# Patient Record
Sex: FEMALE | Race: WHITE | NOT HISPANIC OR LATINO | Employment: FULL TIME | URBAN - METROPOLITAN AREA
[De-identification: names, ages, dates, MRNs, and addresses within clinical notes are randomized per-mention and may not be internally consistent; named-entity substitution may affect disease eponyms.]

---

## 2019-06-05 ENCOUNTER — HOSPITAL ENCOUNTER (EMERGENCY)
Facility: HOSPITAL | Age: 69
Discharge: HOME | End: 2019-06-05
Attending: EMERGENCY MEDICINE
Payer: COMMERCIAL

## 2019-06-05 VITALS
HEART RATE: 56 BPM | RESPIRATION RATE: 24 BRPM | DIASTOLIC BLOOD PRESSURE: 65 MMHG | TEMPERATURE: 98 F | SYSTOLIC BLOOD PRESSURE: 129 MMHG | HEIGHT: 66 IN | OXYGEN SATURATION: 92 % | WEIGHT: 220 LBS | BODY MASS INDEX: 35.36 KG/M2

## 2019-06-05 DIAGNOSIS — J44.1 COPD EXACERBATION: Primary | ICD-10-CM

## 2019-06-05 DIAGNOSIS — R06.02 SHORTNESS OF BREATH: ICD-10-CM

## 2019-06-05 LAB
ALBUMIN SERPL BCP-MCNC: 4 G/DL (ref 3.5–5.2)
ALP SERPL-CCNC: 99 U/L (ref 55–135)
ALT SERPL W/O P-5'-P-CCNC: 36 U/L (ref 10–44)
ANION GAP SERPL CALC-SCNC: 12 MMOL/L (ref 8–16)
AST SERPL-CCNC: 29 U/L (ref 10–40)
BASOPHILS # BLD AUTO: 0.13 K/UL (ref 0–0.2)
BASOPHILS NFR BLD: 1 % (ref 0–1.9)
BILIRUB SERPL-MCNC: 0.7 MG/DL (ref 0.1–1)
BNP SERPL-MCNC: 189 PG/ML (ref 0–99)
BUN SERPL-MCNC: 15 MG/DL (ref 8–23)
CALCIUM SERPL-MCNC: 9.5 MG/DL (ref 8.7–10.5)
CHLORIDE SERPL-SCNC: 103 MMOL/L (ref 95–110)
CO2 SERPL-SCNC: 25 MMOL/L (ref 23–29)
CREAT SERPL-MCNC: 1 MG/DL (ref 0.5–1.4)
DIFFERENTIAL METHOD: ABNORMAL
EOSINOPHIL # BLD AUTO: 2.3 K/UL (ref 0–0.5)
EOSINOPHIL NFR BLD: 18.1 % (ref 0–8)
ERYTHROCYTE [DISTWIDTH] IN BLOOD BY AUTOMATED COUNT: 13.7 % (ref 11.5–14.5)
EST. GFR  (AFRICAN AMERICAN): >60 ML/MIN/1.73 M^2
EST. GFR  (NON AFRICAN AMERICAN): 58 ML/MIN/1.73 M^2
GIANT PLATELETS BLD QL SMEAR: PRESENT
GLUCOSE SERPL-MCNC: 161 MG/DL (ref 70–110)
HCT VFR BLD AUTO: 48.5 % (ref 37–48.5)
HGB BLD-MCNC: 15.9 G/DL (ref 12–16)
INR PPP: 1.1 (ref 0.8–1.2)
LYMPHOCYTES # BLD AUTO: 2.7 K/UL (ref 1–4.8)
LYMPHOCYTES NFR BLD: 21.2 % (ref 18–48)
MAGNESIUM SERPL-MCNC: 2.2 MG/DL (ref 1.6–2.6)
MCH RBC QN AUTO: 30.4 PG (ref 27–31)
MCHC RBC AUTO-ENTMCNC: 32.8 G/DL (ref 32–36)
MCV RBC AUTO: 93 FL (ref 82–98)
MONOCYTES # BLD AUTO: 1.1 K/UL (ref 0.3–1)
MONOCYTES NFR BLD: 8.6 % (ref 4–15)
NEUTROPHILS # BLD AUTO: 6.4 K/UL (ref 1.8–7.7)
NEUTROPHILS NFR BLD: 51.3 % (ref 38–73)
PLATELET # BLD AUTO: 337 K/UL (ref 150–350)
PLATELET BLD QL SMEAR: ABNORMAL
PMV BLD AUTO: 11 FL (ref 9.2–12.9)
POTASSIUM SERPL-SCNC: 3.8 MMOL/L (ref 3.5–5.1)
PROT SERPL-MCNC: 7.6 G/DL (ref 6–8.4)
PROTHROMBIN TIME: 11.7 SEC (ref 9–12.5)
RBC # BLD AUTO: 5.23 M/UL (ref 4–5.4)
SODIUM SERPL-SCNC: 140 MMOL/L (ref 136–145)
TROPONIN I SERPL DL<=0.01 NG/ML-MCNC: 0.01 NG/ML (ref 0–0.03)
WBC # BLD AUTO: 12.57 K/UL (ref 3.9–12.7)

## 2019-06-05 PROCEDURE — 83735 ASSAY OF MAGNESIUM: CPT

## 2019-06-05 PROCEDURE — 93010 EKG 12-LEAD: ICD-10-PCS | Mod: ,,, | Performed by: INTERNAL MEDICINE

## 2019-06-05 PROCEDURE — 27000190 HC CPAP FULL FACE MASK W/VALVE

## 2019-06-05 PROCEDURE — 94660 CPAP INITIATION&MGMT: CPT

## 2019-06-05 PROCEDURE — 96374 THER/PROPH/DIAG INJ IV PUSH: CPT

## 2019-06-05 PROCEDURE — 85025 COMPLETE CBC W/AUTO DIFF WBC: CPT

## 2019-06-05 PROCEDURE — 99285 EMERGENCY DEPT VISIT HI MDM: CPT | Mod: 25

## 2019-06-05 PROCEDURE — 84484 ASSAY OF TROPONIN QUANT: CPT

## 2019-06-05 PROCEDURE — 85610 PROTHROMBIN TIME: CPT

## 2019-06-05 PROCEDURE — 83880 ASSAY OF NATRIURETIC PEPTIDE: CPT

## 2019-06-05 PROCEDURE — 25000242 PHARM REV CODE 250 ALT 637 W/ HCPCS: Performed by: EMERGENCY MEDICINE

## 2019-06-05 PROCEDURE — 94640 AIRWAY INHALATION TREATMENT: CPT

## 2019-06-05 PROCEDURE — 94644 CONT INHLJ TX 1ST HOUR: CPT

## 2019-06-05 PROCEDURE — 63600175 PHARM REV CODE 636 W HCPCS: Performed by: EMERGENCY MEDICINE

## 2019-06-05 PROCEDURE — 99900035 HC TECH TIME PER 15 MIN (STAT)

## 2019-06-05 PROCEDURE — 80053 COMPREHEN METABOLIC PANEL: CPT

## 2019-06-05 PROCEDURE — 93005 ELECTROCARDIOGRAM TRACING: CPT

## 2019-06-05 PROCEDURE — 93010 ELECTROCARDIOGRAM REPORT: CPT | Mod: ,,, | Performed by: INTERNAL MEDICINE

## 2019-06-05 RX ORDER — PREDNISONE 20 MG/1
40 TABLET ORAL DAILY
Qty: 8 TABLET | Refills: 0 | Status: SHIPPED | OUTPATIENT
Start: 2019-06-06 | End: 2019-06-10

## 2019-06-05 RX ORDER — FLUTICASONE PROPIONATE 50 MCG
1 SPRAY, SUSPENSION (ML) NASAL 2 TIMES DAILY
Qty: 1 BOTTLE | Refills: 0 | Status: SHIPPED | OUTPATIENT
Start: 2019-06-05 | End: 2019-06-10

## 2019-06-05 RX ORDER — VENLAFAXINE HYDROCHLORIDE 150 MG/1
150 CAPSULE, EXTENDED RELEASE ORAL DAILY
Status: ON HOLD | COMMUNITY
End: 2023-04-05 | Stop reason: HOSPADM

## 2019-06-05 RX ORDER — BENZONATATE 100 MG/1
100 CAPSULE ORAL 3 TIMES DAILY PRN
Qty: 20 CAPSULE | Refills: 0 | Status: SHIPPED | OUTPATIENT
Start: 2019-06-05 | End: 2019-06-15

## 2019-06-05 RX ORDER — METHYLPREDNISOLONE SOD SUCC 125 MG
125 VIAL (EA) INJECTION
Status: COMPLETED | OUTPATIENT
Start: 2019-06-05 | End: 2019-06-05

## 2019-06-05 RX ORDER — FOSINOPIRL SODIUM 10 MG/1
10 TABLET ORAL DAILY
COMMUNITY
End: 2021-09-27

## 2019-06-05 RX ORDER — ALBUTEROL SULFATE 2.5 MG/.5ML
15 SOLUTION RESPIRATORY (INHALATION)
Status: COMPLETED | OUTPATIENT
Start: 2019-06-05 | End: 2019-06-05

## 2019-06-05 RX ORDER — IPRATROPIUM BROMIDE 0.5 MG/2.5ML
1 SOLUTION RESPIRATORY (INHALATION)
Status: COMPLETED | OUTPATIENT
Start: 2019-06-05 | End: 2019-06-05

## 2019-06-05 RX ORDER — ALBUTEROL SULFATE 90 UG/1
2 AEROSOL, METERED RESPIRATORY (INHALATION) EVERY 4 HOURS PRN
Qty: 1 INHALER | Refills: 0 | Status: SHIPPED | OUTPATIENT
Start: 2019-06-05

## 2019-06-05 RX ADMIN — METHYLPREDNISOLONE SODIUM SUCCINATE 125 MG: 125 INJECTION, POWDER, FOR SOLUTION INTRAMUSCULAR; INTRAVENOUS at 12:06

## 2019-06-05 RX ADMIN — IPRATROPIUM BROMIDE 1 MG: 0.5 SOLUTION RESPIRATORY (INHALATION) at 12:06

## 2019-06-05 RX ADMIN — ALBUTEROL SULFATE 15 MG: 2.5 SOLUTION RESPIRATORY (INHALATION) at 12:06

## 2019-06-05 NOTE — PROGRESS NOTES
Pt arrived via EMS on CPAP sats 97%. Put pt on V60 BiPAP 12/5@50% sats 98%. Slight redness on bridge of nose from previous mask. Loose fit on new mask with good seal. Will change to TFM if redness does not improve.

## 2019-06-05 NOTE — ED TRIAGE NOTES
"Pt arrived to ED with c/o SOB x 1week but worse today. EMS reports pt RA sat was "88% but sounded like a fish." Pt on cpap pta, placed on bipap in room, 97%.   "

## 2019-06-05 NOTE — DISCHARGE INSTRUCTIONS
Your lab tests are within acceptable limits.  Chest x-ray does not show evidence of pneumonia.  Your symptoms are likely related to COPD.  Is important that you complete the entire course of prednisone prescribed.  Use albuterol as needed for shortness of breath or severe cough.  Return to the emergency department for fever, chest pain, breathing difficulty not improved by albuterol or any new, worsening or concerning symptoms.  It is very important that you maintain your appointment to have pulmonary function test as well as be evaluated by the pulmonologist.

## 2019-06-05 NOTE — ED PROVIDER NOTES
Encounter Date: 2019    SCRIBE #1 NOTE: I, Ami Vigil, am scribing for, and in the presence of,  Dr. Fritz. I have scribed the following portions of the note -       History     Chief Complaint   Patient presents with    Respiratory Distress     pt arrives via EMS for CC of SOB x 1-2 weeks and resp distress that started this morning; EMS was found at home diaphoretic and placed on CPAP; originally 88% on RA and went up to 98% on CPAP; pt has nitro paste on (L) side of chest     CC: SOB    HPI:  This is a 69 y.o. Female smoker with a PMHx of HTN and MI who presents to the Emergency Department with a cc of SOB that worsened this morning. Patient reports chronic cough for the past 6 months for which she has been following with her primary physician. 4 days ago her cough worsened and today she woke up with SOB. The patient reports associated nausea and headache when having severe episodes of coughing but denies vomiting, vision changes, numbness or weakness.. There were no reports alleviating or worsening factors; tried Albuterol pump without relief. She is scheduled for a PFTs tomorrow with pulmonology evaluation the following day.. She denies recent use of steroids. Also reports compliance with regular medication regimen.           The history is provided by the patient.     Review of patient's allergies indicates:   Allergen Reactions    Talwin [pentazocine lactate] Shortness Of Breath     Past Medical History:   Diagnosis Date    Anxiety     Depression     Heart attack     12 years ago    Hypertension      Past Surgical History:   Procedure Laterality Date     SECTION      EYE SURGERY       Family History   Problem Relation Age of Onset    Diabetes Mother     Heart disease Mother     Hypertension Mother     Diabetes Father     Heart disease Father     Diabetes Sister     Heart disease Sister      Social History     Tobacco Use    Smoking status: Current Every Day Smoker     Packs/day:  0.50     Types: Cigarettes   Substance Use Topics    Alcohol use: Yes     Comment: seldom    Drug use: No     Review of Systems   Constitutional: Negative for activity change.   HENT: Negative for drooling.    Eyes: Positive for visual disturbance. Negative for itching.   Respiratory: Positive for cough and shortness of breath.    Cardiovascular: Negative for chest pain and leg swelling.   Gastrointestinal: Positive for nausea. Negative for vomiting.   Genitourinary: Negative for dysuria.   Musculoskeletal: Negative for neck stiffness.   Skin: Negative for color change.   Neurological: Positive for headaches.   Psychiatric/Behavioral: Negative for confusion.       Physical Exam     Initial Vitals   BP Pulse Resp Temp SpO2   06/05/19 1227 06/05/19 1227 06/05/19 1231 06/05/19 1231 06/05/19 1227   (!) 213/101 65 (!) 26 98 °F (36.7 °C) 95 %      MAP       --                Physical Exam    Nursing note and vitals reviewed.  Constitutional: She is not diaphoretic. No distress.   bipap in place, speaks in full sentences   HENT:   Head: Normocephalic and atraumatic.   Mouth/Throat: Oropharynx is clear and moist.   Eyes: EOM are normal. Pupils are equal, round, and reactive to light. Right eye exhibits no discharge. No scleral icterus.   Neck: Normal range of motion. Neck supple. No JVD present.   Cardiovascular: Normal rate, regular rhythm and intact distal pulses.   Pulmonary/Chest: No stridor. No respiratory distress. She has wheezes (wheezing bilaterally ).   Abdominal: Soft. Bowel sounds are normal. She exhibits no distension. There is no tenderness.   Musculoskeletal: Normal range of motion. She exhibits no edema or tenderness.   Neurological: She is alert and oriented to person, place, and time. She has normal strength. No cranial nerve deficit or sensory deficit. GCS score is 15. GCS eye subscore is 4. GCS verbal subscore is 5. GCS motor subscore is 6.   Skin: Skin is warm and dry.   Psychiatric: She has a normal  mood and affect.         ED Course   Procedures  Labs Reviewed   CBC W/ AUTO DIFFERENTIAL - Abnormal; Notable for the following components:       Result Value    Mono # 1.1 (*)     Eos # 2.3 (*)     Eosinophil% 18.1 (*)     All other components within normal limits   COMPREHENSIVE METABOLIC PANEL - Abnormal; Notable for the following components:    Glucose 161 (*)     eGFR if non  58 (*)     All other components within normal limits   B-TYPE NATRIURETIC PEPTIDE - Abnormal; Notable for the following components:     (*)     All other components within normal limits   TROPONIN I   PROTIME-INR   MAGNESIUM     EKG Readings: (Independently Interpreted)   Initial Reading: No STEMI. Rhythm: Normal Sinus Rhythm. Heart Rate: 63. Ectopy: No Ectopy. Conduction: Normal. ST Segments: Normal ST Segments. T Waves: Normal. Clinical Impression: Normal Sinus Rhythm     ECG Results          EKG 12-lead (In process)  Result time 06/05/19 15:08:29    In process by Interface, Lab In [facility] (06/05/19 15:08:29)                 Narrative:    Test Reason : R06.02,    Vent. Rate : 063 BPM     Atrial Rate : 063 BPM     P-R Int : 166 ms          QRS Dur : 082 ms      QT Int : 420 ms       P-R-T Axes : 058 062 076 degrees     QTc Int : 429 ms    Normal sinus rhythm  Normal ECG  No previous ECGs available    Referred By: [email or web address]   SELF           Confirmed By:                   In process by Interface, Lab In [facility] (06/05/19 15:03:12)                 Narrative:    Test Reason : R06.02,    Vent. Rate : 063 BPM     Atrial Rate : 063 BPM     P-R Int : 166 ms          QRS Dur : 082 ms      QT Int : 420 ms       P-R-T Axes : 058 062 076 degrees     QTc Int : 429 ms    Normal sinus rhythm  Normal ECG  No previous ECGs available    Referred By: [email or web address]   SELF           Confirmed By:                             Imaging Results          X-Ray Chest AP Portable (Final result)  Result time 06/05/19 13:09:55    Final result by  Santo Kingsley MD (06/05/19 13:09:55)                 Impression:      See above      Electronically signed by: Santo Kingsley MD  Date:    06/05/2019  Time:    13:09             Narrative:    EXAMINATION:  XR CHEST AP PORTABLE    CLINICAL HISTORY:  SOB;    TECHNIQUE:  Single frontal view of the chest was performed.    COMPARISON:  None    FINDINGS:  Heart size normal.  The lungs are clear.  No pleural effusion.                              X-Rays:   Independently Interpreted Readings:   Chest X-Ray: No infiltrates.  No acute abnormalities.     Medical Decision Making:   History:   Old Medical Records: I decided to obtain old medical records.  Old Records Summarized: records from clinic visits.       <> Summary of Records: Recent PMD visits for evaluation of chronic cough.  Patient scheduled for pulmonary function test  Differential Diagnosis:    COPD exacerbation, CHF exacerbation, pneumonia, ACS, respiratory failure   Independently Interpreted Test(s):   I have ordered and independently interpreted X-rays - see prior notes.  I have ordered and independently interpreted EKG Reading(s) - see prior notes  Clinical Tests:   Lab Tests: Ordered and Reviewed  Radiological Study: Reviewed and Ordered  Medical Tests: Reviewed and Ordered  ED Management:  Patient is afebrile and in no acute distress at time history and physical.  She has BiPAP in place at time my assessment and is tolerating it well.  Patient speaks in full sentences and does not appear to be in respiratory distress. She has significant wheezing bilaterally. EKG is without definite acute ischemic changes.  Labs without leukocytosis or granulocytosis.  Chest x-ray without evidence of pneumonia.  Electrolytes within acceptable limits.  BNP is mildly elevated however patient has no JVD, no lower extremity edema and no pulmonary edema on chest x-ray.  Patient given continuous albuterol nebs and IV Solu-Medrol.  On reassessment she is resting comfortably in  symoneer.  She reports significant improvement in shortness of breath. Her oxygen saturation is in the low 90s on room air.  This is improved from the 80s that was reported prior to treatment.  Patient continues to speaking full sentences and does not have respiratory distress. She is stable for discharge to complete course prednisone and use albuterol pump and follow up with pulmonology for PFTs and further testing.  Patient and family members counseled on supportive care, appropriate medication usage, concerning symptoms for which to return to ER and the importance of follow up. Understanding and agreement with treatment plan was expressed.   This chart was completed using dictation software, as a result there may be some transcription errors.                       Clinical Impression:     1. COPD exacerbation    2. Shortness of breath            Disposition:   Disposition: Discharged  Condition: Stable           Scribe attestation: I, Chito Fritz , personally performed the services described in this documentation. All medical record entries made by the scribe were at my direction and in my presence.  I have reviewed the chart and agree that the record reflects my personal performance and is accurate and complete.               Chito Fritz MD  06/05/19 [id number]

## 2021-09-27 ENCOUNTER — HOSPITAL ENCOUNTER (OUTPATIENT)
Facility: HOSPITAL | Age: 71
Discharge: HOME | End: 2021-09-28
Attending: EMERGENCY MEDICINE | Admitting: STUDENT IN AN ORGANIZED HEALTH CARE EDUCATION/TRAINING PROGRAM
Payer: COMMERCIAL

## 2021-09-27 DIAGNOSIS — I25.110 CORONARY ARTERY DISEASE INVOLVING NATIVE CORONARY ARTERY OF NATIVE HEART WITH UNSTABLE ANGINA PECTORIS: ICD-10-CM

## 2021-09-27 DIAGNOSIS — R79.89 ELEVATED TROPONIN: ICD-10-CM

## 2021-09-27 DIAGNOSIS — R07.9 CHEST PAIN: ICD-10-CM

## 2021-09-27 DIAGNOSIS — R07.9 CHEST PAIN, MODERATE CORONARY ARTERY RISK: Primary | ICD-10-CM

## 2021-09-27 PROBLEM — I10 ESSENTIAL HYPERTENSION: Status: ACTIVE | Noted: 2021-09-27

## 2021-09-27 PROBLEM — I25.10 ATHEROSCLEROSIS OF CORONARY ARTERY WITHOUT ANGINA PECTORIS: Status: ACTIVE | Noted: 2021-09-27

## 2021-09-27 PROBLEM — J41.0 SIMPLE CHRONIC BRONCHITIS: Status: ACTIVE | Noted: 2021-09-27

## 2021-09-27 PROBLEM — Z87.891 FORMER SMOKER: Status: ACTIVE | Noted: 2021-09-27

## 2021-09-27 LAB
ALBUMIN SERPL BCP-MCNC: 3.6 G/DL (ref 3.5–5.2)
ALP SERPL-CCNC: 111 U/L (ref 55–135)
ALT SERPL W/O P-5'-P-CCNC: 30 U/L (ref 10–44)
ANION GAP SERPL CALC-SCNC: 11 MMOL/L (ref 8–16)
AORTIC ROOT ANNULUS: 2.73 CM
AORTIC VALVE CUSP SEPERATION: 1.95 CM
ASCENDING AORTA: 2.63 CM
AST SERPL-CCNC: 19 U/L (ref 10–40)
AV INDEX (PROSTH): 0.85
AV MEAN GRADIENT: 8 MMHG
AV PEAK GRADIENT: 12 MMHG
AV VALVE AREA: 2.54 CM2
AV VELOCITY RATIO: 0.93
BASOPHILS # BLD AUTO: 0.1 K/UL (ref 0–0.2)
BASOPHILS NFR BLD: 0.8 % (ref 0–1.9)
BILIRUB SERPL-MCNC: 0.6 MG/DL (ref 0.1–1)
BSA FOR ECHO PROCEDURE: 2.16 M2
BUN SERPL-MCNC: 20 MG/DL (ref 8–23)
CALCIUM SERPL-MCNC: 9.9 MG/DL (ref 8.7–10.5)
CHLORIDE SERPL-SCNC: 107 MMOL/L (ref 95–110)
CHOLEST SERPL-MCNC: 122 MG/DL (ref 120–199)
CHOLEST/HDLC SERPL: 2.7 {RATIO} (ref 2–5)
CO2 SERPL-SCNC: 23 MMOL/L (ref 23–29)
CREAT SERPL-MCNC: 0.8 MG/DL (ref 0.5–1.4)
CTP QC/QA: YES
CV ECHO LV RWT: 0.69 CM
DIFFERENTIAL METHOD: ABNORMAL
DOP CALC AO PEAK VEL: 1.75 M/S
DOP CALC AO VTI: 41.57 CM
DOP CALC LVOT AREA: 3 CM2
DOP CALC LVOT DIAMETER: 1.95 CM
DOP CALC LVOT PEAK VEL: 1.62 M/S
DOP CALC LVOT STROKE VOLUME: 105.43 CM3
DOP CALCLVOT PEAK VEL VTI: 35.32 CM
E WAVE DECELERATION TIME: 309.86 MSEC
E/A RATIO: 1.14
E/E' RATIO: 15.17 M/S
ECHO LV POSTERIOR WALL: 1.34 CM (ref 0.6–1.1)
EJECTION FRACTION: 65 %
EOSINOPHIL # BLD AUTO: 0.4 K/UL (ref 0–0.5)
EOSINOPHIL NFR BLD: 2.9 % (ref 0–8)
ERYTHROCYTE [DISTWIDTH] IN BLOOD BY AUTOMATED COUNT: 13.7 % (ref 11.5–14.5)
EST. GFR  (AFRICAN AMERICAN): >60 ML/MIN/1.73 M^2
EST. GFR  (NON AFRICAN AMERICAN): >60 ML/MIN/1.73 M^2
FRACTIONAL SHORTENING: 36 % (ref 28–44)
GLUCOSE SERPL-MCNC: 188 MG/DL (ref 70–110)
HCT VFR BLD AUTO: 47.4 % (ref 37–48.5)
HDLC SERPL-MCNC: 46 MG/DL (ref 40–75)
HDLC SERPL: 37.7 % (ref 20–50)
HGB BLD-MCNC: 16.4 G/DL (ref 12–16)
IMM GRANULOCYTES # BLD AUTO: 0.04 K/UL (ref 0–0.04)
IMM GRANULOCYTES NFR BLD AUTO: 0.3 % (ref 0–0.5)
INTERVENTRICULAR SEPTUM: 1.28 CM (ref 0.6–1.1)
IVRT: 124.57 MSEC
LA MAJOR: 5.68 CM
LA MINOR: 5.52 CM
LA WIDTH: 4.02 CM
LDLC SERPL CALC-MCNC: 58.4 MG/DL (ref 63–159)
LEFT ATRIUM SIZE: 3.47 CM
LEFT ATRIUM VOLUME INDEX: 31.9 ML/M2
LEFT ATRIUM VOLUME: 66.39 CM3
LEFT INTERNAL DIMENSION IN SYSTOLE: 2.5 CM (ref 2.1–4)
LEFT VENTRICLE DIASTOLIC VOLUME INDEX: 31.37 ML/M2
LEFT VENTRICLE DIASTOLIC VOLUME: 65.24 ML
LEFT VENTRICLE MASS INDEX: 87 G/M2
LEFT VENTRICLE SYSTOLIC VOLUME INDEX: 10.7 ML/M2
LEFT VENTRICLE SYSTOLIC VOLUME: 22.31 ML
LEFT VENTRICULAR INTERNAL DIMENSION IN DIASTOLE: 3.88 CM (ref 3.5–6)
LEFT VENTRICULAR MASS: 180.49 G
LV LATERAL E/E' RATIO: 13 M/S
LV SEPTAL E/E' RATIO: 18.2 M/S
LYMPHOCYTES # BLD AUTO: 1.8 K/UL (ref 1–4.8)
LYMPHOCYTES NFR BLD: 13.9 % (ref 18–48)
MAGNESIUM SERPL-MCNC: 2.1 MG/DL (ref 1.6–2.6)
MCH RBC QN AUTO: 30.6 PG (ref 27–31)
MCHC RBC AUTO-ENTMCNC: 34.6 G/DL (ref 32–36)
MCV RBC AUTO: 88 FL (ref 82–98)
MONOCYTES # BLD AUTO: 1.2 K/UL (ref 0.3–1)
MONOCYTES NFR BLD: 9.2 % (ref 4–15)
MV PEAK A VEL: 0.8 M/S
MV PEAK E VEL: 0.91 M/S
MV STENOSIS PRESSURE HALF TIME: 89.86 MS
MV VALVE AREA P 1/2 METHOD: 2.45 CM2
NEUTROPHILS # BLD AUTO: 9.2 K/UL (ref 1.8–7.7)
NEUTROPHILS NFR BLD: 72.9 % (ref 38–73)
NONHDLC SERPL-MCNC: 76 MG/DL
NRBC BLD-RTO: 0 /100 WBC
PISA TR MAX VEL: 2.17 M/S
PLATELET # BLD AUTO: 280 K/UL (ref 150–450)
PMV BLD AUTO: 10.4 FL (ref 9.2–12.9)
POTASSIUM SERPL-SCNC: 3 MMOL/L (ref 3.5–5.1)
PROT SERPL-MCNC: 7.2 G/DL (ref 6–8.4)
PULM VEIN S/D RATIO: 0.91
PV PEAK D VEL: 0.53 M/S
PV PEAK S VEL: 0.48 M/S
PV PEAK VELOCITY: 0.94 CM/S
RA MAJOR: 4.96 CM
RA WIDTH: 2.98 CM
RBC # BLD AUTO: 5.36 M/UL (ref 4–5.4)
RIGHT VENTRICULAR END-DIASTOLIC DIMENSION: 3.38 CM
RV TISSUE DOPPLER FREE WALL SYSTOLIC VELOCITY 1 (APICAL 4 CHAMBER VIEW): 13.72 CM/S
SARS-COV-2 RDRP RESP QL NAA+PROBE: NEGATIVE
SINUS: 2.84 CM
SODIUM SERPL-SCNC: 141 MMOL/L (ref 136–145)
STJ: 2.3 CM
TDI LATERAL: 0.07 M/S
TDI SEPTAL: 0.05 M/S
TDI: 0.06 M/S
TR MAX PG: 19 MMHG
TRICUSPID ANNULAR PLANE SYSTOLIC EXCURSION: 1.76 CM
TRIGL SERPL-MCNC: 88 MG/DL (ref 30–150)
TROPONIN I SERPL DL<=0.01 NG/ML-MCNC: 0.07 NG/ML (ref 0–0.03)
TROPONIN I SERPL DL<=0.01 NG/ML-MCNC: 0.13 NG/ML (ref 0–0.03)
TROPONIN I SERPL DL<=0.01 NG/ML-MCNC: 0.14 NG/ML (ref 0–0.03)
TSH SERPL DL<=0.005 MIU/L-ACNC: 2.31 UIU/ML (ref 0.4–4)
WBC # BLD AUTO: 12.63 K/UL (ref 3.9–12.7)

## 2021-09-27 PROCEDURE — 93005 ELECTROCARDIOGRAM TRACING: CPT

## 2021-09-27 PROCEDURE — U0002 COVID-19 LAB TEST NON-CDC: HCPCS | Performed by: EMERGENCY MEDICINE

## 2021-09-27 PROCEDURE — 84484 ASSAY OF TROPONIN QUANT: CPT | Performed by: NURSE PRACTITIONER

## 2021-09-27 PROCEDURE — 36415 COLL VENOUS BLD VENIPUNCTURE: CPT | Performed by: STUDENT IN AN ORGANIZED HEALTH CARE EDUCATION/TRAINING PROGRAM

## 2021-09-27 PROCEDURE — 25000003 PHARM REV CODE 250: Performed by: NURSE PRACTITIONER

## 2021-09-27 PROCEDURE — 84443 ASSAY THYROID STIM HORMONE: CPT | Performed by: NURSE PRACTITIONER

## 2021-09-27 PROCEDURE — G0378 HOSPITAL OBSERVATION PER HR: HCPCS

## 2021-09-27 PROCEDURE — 80053 COMPREHEN METABOLIC PANEL: CPT | Performed by: NURSE PRACTITIONER

## 2021-09-27 PROCEDURE — 83735 ASSAY OF MAGNESIUM: CPT | Performed by: NURSE PRACTITIONER

## 2021-09-27 PROCEDURE — 99284 PR EMERGENCY DEPT VISIT,LEVEL IV: ICD-10-PCS | Mod: 25,,, | Performed by: INTERNAL MEDICINE

## 2021-09-27 PROCEDURE — 63600175 PHARM REV CODE 636 W HCPCS: Performed by: NURSE PRACTITIONER

## 2021-09-27 PROCEDURE — 93010 ELECTROCARDIOGRAM REPORT: CPT | Mod: ,,, | Performed by: INTERNAL MEDICINE

## 2021-09-27 PROCEDURE — 85025 COMPLETE CBC W/AUTO DIFF WBC: CPT | Performed by: NURSE PRACTITIONER

## 2021-09-27 PROCEDURE — 96366 THER/PROPH/DIAG IV INF ADDON: CPT

## 2021-09-27 PROCEDURE — 84484 ASSAY OF TROPONIN QUANT: CPT | Mod: 91 | Performed by: STUDENT IN AN ORGANIZED HEALTH CARE EDUCATION/TRAINING PROGRAM

## 2021-09-27 PROCEDURE — 99284 EMERGENCY DEPT VISIT MOD MDM: CPT | Mod: 25,,, | Performed by: INTERNAL MEDICINE

## 2021-09-27 PROCEDURE — 80061 LIPID PANEL: CPT | Performed by: NURSE PRACTITIONER

## 2021-09-27 PROCEDURE — 99285 EMERGENCY DEPT VISIT HI MDM: CPT | Mod: 25

## 2021-09-27 PROCEDURE — 84484 ASSAY OF TROPONIN QUANT: CPT | Mod: 91 | Performed by: NURSE PRACTITIONER

## 2021-09-27 PROCEDURE — 93010 EKG 12-LEAD: ICD-10-PCS | Mod: ,,, | Performed by: INTERNAL MEDICINE

## 2021-09-27 PROCEDURE — 96365 THER/PROPH/DIAG IV INF INIT: CPT | Mod: 59

## 2021-09-27 RX ORDER — EPINEPHRINE 0.15 MG/.3ML
0.15 INJECTION INTRAMUSCULAR
COMMUNITY

## 2021-09-27 RX ORDER — FAMOTIDINE 20 MG/1
20 TABLET, FILM COATED ORAL 2 TIMES DAILY
Status: ON HOLD | COMMUNITY
End: 2023-04-05 | Stop reason: HOSPADM

## 2021-09-27 RX ORDER — ONDANSETRON 8 MG/1
8 TABLET, ORALLY DISINTEGRATING ORAL EVERY 8 HOURS PRN
Status: DISCONTINUED | OUTPATIENT
Start: 2021-09-27 | End: 2021-09-28 | Stop reason: HOSPADM

## 2021-09-27 RX ORDER — LISINOPRIL 5 MG/1
10 TABLET ORAL DAILY
Status: DISCONTINUED | OUTPATIENT
Start: 2021-09-27 | End: 2021-09-27

## 2021-09-27 RX ORDER — FLUTICASONE PROPIONATE 50 MCG
1 SPRAY, SUSPENSION (ML) NASAL DAILY
COMMUNITY

## 2021-09-27 RX ORDER — ACETAMINOPHEN 325 MG/1
650 TABLET ORAL EVERY 4 HOURS PRN
Status: DISCONTINUED | OUTPATIENT
Start: 2021-09-27 | End: 2021-09-28 | Stop reason: HOSPADM

## 2021-09-27 RX ORDER — NITROGLYCERIN 0.4 MG/1
0.4 TABLET SUBLINGUAL
COMMUNITY
Start: 2021-06-02 | End: 2023-04-12

## 2021-09-27 RX ORDER — ATORVASTATIN CALCIUM 40 MG/1
40 TABLET, FILM COATED ORAL DAILY
Status: DISCONTINUED | OUTPATIENT
Start: 2021-09-27 | End: 2021-09-28 | Stop reason: HOSPADM

## 2021-09-27 RX ORDER — METOPROLOL TARTRATE 50 MG/1
50 TABLET ORAL 2 TIMES DAILY
Status: ON HOLD | COMMUNITY
End: 2023-04-05 | Stop reason: SDUPTHER

## 2021-09-27 RX ORDER — SODIUM CHLORIDE 0.9 % (FLUSH) 0.9 %
10 SYRINGE (ML) INJECTION
Status: DISCONTINUED | OUTPATIENT
Start: 2021-09-27 | End: 2021-09-28 | Stop reason: HOSPADM

## 2021-09-27 RX ORDER — METOPROLOL SUCCINATE 50 MG/1
50 TABLET, EXTENDED RELEASE ORAL DAILY
Status: DISCONTINUED | OUTPATIENT
Start: 2021-09-27 | End: 2021-09-28 | Stop reason: HOSPADM

## 2021-09-27 RX ORDER — HYDROCHLOROTHIAZIDE 25 MG/1
25 TABLET ORAL DAILY
Status: DISCONTINUED | OUTPATIENT
Start: 2021-09-27 | End: 2021-09-28

## 2021-09-27 RX ORDER — ASPIRIN 81 MG/1
81 TABLET ORAL DAILY
Status: DISCONTINUED | OUTPATIENT
Start: 2021-09-28 | End: 2021-09-28 | Stop reason: HOSPADM

## 2021-09-27 RX ORDER — POTASSIUM CHLORIDE 7.45 MG/ML
10 INJECTION INTRAVENOUS
Status: DISPENSED | OUTPATIENT
Start: 2021-09-27 | End: 2021-09-27

## 2021-09-27 RX ADMIN — HYDROCHLOROTHIAZIDE 25 MG: 25 TABLET ORAL at 10:09

## 2021-09-27 RX ADMIN — POTASSIUM CHLORIDE 10 MEQ: 7.46 INJECTION, SOLUTION INTRAVENOUS at 10:09

## 2021-09-27 RX ADMIN — METOPROLOL SUCCINATE 50 MG: 50 TABLET, EXTENDED RELEASE ORAL at 10:09

## 2021-09-27 RX ADMIN — ATORVASTATIN CALCIUM 40 MG: 40 TABLET, FILM COATED ORAL at 10:09

## 2021-09-28 VITALS
BODY MASS INDEX: 35.36 KG/M2 | RESPIRATION RATE: 17 BRPM | OXYGEN SATURATION: 97 % | DIASTOLIC BLOOD PRESSURE: 62 MMHG | HEIGHT: 66 IN | WEIGHT: 220 LBS | SYSTOLIC BLOOD PRESSURE: 135 MMHG | HEART RATE: 63 BPM | TEMPERATURE: 99 F

## 2021-09-28 LAB
ALBUMIN SERPL BCP-MCNC: 3.5 G/DL (ref 3.5–5.2)
ALP SERPL-CCNC: 93 U/L (ref 55–135)
ALT SERPL W/O P-5'-P-CCNC: 29 U/L (ref 10–44)
AMORPH CRY URNS QL MICRO: ABNORMAL
ANION GAP SERPL CALC-SCNC: 11 MMOL/L (ref 8–16)
AST SERPL-CCNC: 22 U/L (ref 10–40)
BACTERIA #/AREA URNS HPF: ABNORMAL /HPF
BASOPHILS # BLD AUTO: 0.1 K/UL (ref 0–0.2)
BASOPHILS NFR BLD: 0.9 % (ref 0–1.9)
BILIRUB SERPL-MCNC: 0.8 MG/DL (ref 0.1–1)
BILIRUB UR QL STRIP: NEGATIVE
BUN SERPL-MCNC: 14 MG/DL (ref 8–23)
CALCIUM SERPL-MCNC: 9.6 MG/DL (ref 8.7–10.5)
CHLORIDE SERPL-SCNC: 109 MMOL/L (ref 95–110)
CLARITY UR: ABNORMAL
CO2 SERPL-SCNC: 23 MMOL/L (ref 23–29)
COLOR UR: YELLOW
CREAT SERPL-MCNC: 0.8 MG/DL (ref 0.5–1.4)
CV STRESS BASE HR: 66 BPM
DIASTOLIC BLOOD PRESSURE: 71 MMHG
DIFFERENTIAL METHOD: ABNORMAL
EOSINOPHIL # BLD AUTO: 0.8 K/UL (ref 0–0.5)
EOSINOPHIL NFR BLD: 7 % (ref 0–8)
ERYTHROCYTE [DISTWIDTH] IN BLOOD BY AUTOMATED COUNT: 13.8 % (ref 11.5–14.5)
EST. GFR  (AFRICAN AMERICAN): >60 ML/MIN/1.73 M^2
EST. GFR  (NON AFRICAN AMERICAN): >60 ML/MIN/1.73 M^2
GLUCOSE SERPL-MCNC: 115 MG/DL (ref 70–110)
GLUCOSE UR QL STRIP: NEGATIVE
HCT VFR BLD AUTO: 46.7 % (ref 37–48.5)
HGB BLD-MCNC: 15.4 G/DL (ref 12–16)
HGB UR QL STRIP: ABNORMAL
HYALINE CASTS #/AREA URNS LPF: 0 /LPF
IMM GRANULOCYTES # BLD AUTO: 0.03 K/UL (ref 0–0.04)
IMM GRANULOCYTES NFR BLD AUTO: 0.3 % (ref 0–0.5)
KETONES UR QL STRIP: NEGATIVE
LEUKOCYTE ESTERASE UR QL STRIP: ABNORMAL
LYMPHOCYTES # BLD AUTO: 3.2 K/UL (ref 1–4.8)
LYMPHOCYTES NFR BLD: 29.7 % (ref 18–48)
MAGNESIUM SERPL-MCNC: 2.2 MG/DL (ref 1.6–2.6)
MCH RBC QN AUTO: 30.3 PG (ref 27–31)
MCHC RBC AUTO-ENTMCNC: 33 G/DL (ref 32–36)
MCV RBC AUTO: 92 FL (ref 82–98)
MICROSCOPIC COMMENT: ABNORMAL
MONOCYTES # BLD AUTO: 1.2 K/UL (ref 0.3–1)
MONOCYTES NFR BLD: 11.3 % (ref 4–15)
NEUTROPHILS # BLD AUTO: 5.5 K/UL (ref 1.8–7.7)
NEUTROPHILS NFR BLD: 50.8 % (ref 38–73)
NITRITE UR QL STRIP: NEGATIVE
NON-SQ EPI CELLS #/AREA URNS HPF: 3 /HPF
NRBC BLD-RTO: 0 /100 WBC
NUC STRESS DIASTOLIC VOLUME INDEX: 39
NUC STRESS EJECTION FRACTION: 83 %
NUC STRESS SYSTOLIC VOLUME INDEX: 7
OHS CV CPX 85 PERCENT MAX PREDICTED HEART RATE MALE: 122
OHS CV CPX MAX PREDICTED HEART RATE: 144
OHS CV CPX PATIENT IS FEMALE: 1
OHS CV CPX PATIENT IS MALE: 0
OHS CV CPX PEAK DIASTOLIC BLOOD PRESSURE: 63 MMHG
OHS CV CPX PEAK HEAR RATE: 76 BPM
OHS CV CPX PEAK RATE PRESSURE PRODUCT: 8512
OHS CV CPX PEAK SYSTOLIC BLOOD PRESSURE: 112 MMHG
OHS CV CPX PERCENT MAX PREDICTED HEART RATE ACHIEVED: 53
OHS CV CPX RATE PRESSURE PRODUCT PRESENTING: 9636
PH UR STRIP: 6 [PH] (ref 5–8)
PLATELET # BLD AUTO: 296 K/UL (ref 150–450)
PMV BLD AUTO: 10.4 FL (ref 9.2–12.9)
POCT GLUCOSE: 123 MG/DL (ref 70–110)
POTASSIUM SERPL-SCNC: 3.4 MMOL/L (ref 3.5–5.1)
PROT SERPL-MCNC: 6.9 G/DL (ref 6–8.4)
PROT UR QL STRIP: ABNORMAL
RBC # BLD AUTO: 5.08 M/UL (ref 4–5.4)
RBC #/AREA URNS HPF: 5 /HPF (ref 0–4)
SODIUM SERPL-SCNC: 143 MMOL/L (ref 136–145)
SP GR UR STRIP: 1.02 (ref 1–1.03)
SQUAMOUS #/AREA URNS HPF: 10 /HPF
SYSTOLIC BLOOD PRESSURE: 146 MMHG
TROPONIN I SERPL DL<=0.01 NG/ML-MCNC: 0.05 NG/ML (ref 0–0.03)
TROPONIN I SERPL DL<=0.01 NG/ML-MCNC: 0.07 NG/ML (ref 0–0.03)
TROPONIN I SERPL DL<=0.01 NG/ML-MCNC: 0.08 NG/ML (ref 0–0.03)
TROPONIN I SERPL DL<=0.01 NG/ML-MCNC: 0.09 NG/ML (ref 0–0.03)
UNIDENT CRYS URNS QL MICRO: ABNORMAL
URN SPEC COLLECT METH UR: ABNORMAL
UROBILINOGEN UR STRIP-ACNC: NEGATIVE EU/DL
WBC # BLD AUTO: 10.77 K/UL (ref 3.9–12.7)
WBC #/AREA URNS HPF: 60 /HPF (ref 0–5)
WBC CLUMPS URNS QL MICRO: ABNORMAL
YEAST URNS QL MICRO: ABNORMAL

## 2021-09-28 PROCEDURE — 87147 CULTURE TYPE IMMUNOLOGIC: CPT | Performed by: NURSE PRACTITIONER

## 2021-09-28 PROCEDURE — 81000 URINALYSIS NONAUTO W/SCOPE: CPT | Performed by: NURSE PRACTITIONER

## 2021-09-28 PROCEDURE — 36415 COLL VENOUS BLD VENIPUNCTURE: CPT | Performed by: STUDENT IN AN ORGANIZED HEALTH CARE EDUCATION/TRAINING PROGRAM

## 2021-09-28 PROCEDURE — 80053 COMPREHEN METABOLIC PANEL: CPT | Performed by: NURSE PRACTITIONER

## 2021-09-28 PROCEDURE — 84484 ASSAY OF TROPONIN QUANT: CPT | Mod: 91 | Performed by: STUDENT IN AN ORGANIZED HEALTH CARE EDUCATION/TRAINING PROGRAM

## 2021-09-28 PROCEDURE — 87088 URINE BACTERIA CULTURE: CPT | Performed by: NURSE PRACTITIONER

## 2021-09-28 PROCEDURE — 87077 CULTURE AEROBIC IDENTIFY: CPT | Performed by: NURSE PRACTITIONER

## 2021-09-28 PROCEDURE — G0378 HOSPITAL OBSERVATION PER HR: HCPCS

## 2021-09-28 PROCEDURE — 63600175 PHARM REV CODE 636 W HCPCS: Performed by: INTERNAL MEDICINE

## 2021-09-28 PROCEDURE — 25000003 PHARM REV CODE 250: Performed by: NURSE PRACTITIONER

## 2021-09-28 PROCEDURE — 87086 URINE CULTURE/COLONY COUNT: CPT | Performed by: NURSE PRACTITIONER

## 2021-09-28 PROCEDURE — 85025 COMPLETE CBC W/AUTO DIFF WBC: CPT | Performed by: NURSE PRACTITIONER

## 2021-09-28 PROCEDURE — 87186 SC STD MICRODIL/AGAR DIL: CPT | Performed by: NURSE PRACTITIONER

## 2021-09-28 PROCEDURE — 83735 ASSAY OF MAGNESIUM: CPT | Performed by: NURSE PRACTITIONER

## 2021-09-28 RX ORDER — REGADENOSON 0.08 MG/ML
0.4 INJECTION, SOLUTION INTRAVENOUS ONCE
Status: COMPLETED | OUTPATIENT
Start: 2021-09-28 | End: 2021-09-28

## 2021-09-28 RX ORDER — POTASSIUM CHLORIDE 20 MEQ/1
40 TABLET, EXTENDED RELEASE ORAL ONCE
Status: COMPLETED | OUTPATIENT
Start: 2021-09-28 | End: 2021-09-28

## 2021-09-28 RX ORDER — NITROFURANTOIN 25; 75 MG/1; MG/1
100 CAPSULE ORAL EVERY 12 HOURS
Qty: 10 CAPSULE | Refills: 0 | Status: SHIPPED | OUTPATIENT
Start: 2021-09-28 | End: 2021-10-03

## 2021-09-28 RX ORDER — NITROFURANTOIN 25; 75 MG/1; MG/1
100 CAPSULE ORAL EVERY 12 HOURS
Status: DISCONTINUED | OUTPATIENT
Start: 2021-09-28 | End: 2021-09-28 | Stop reason: HOSPADM

## 2021-09-28 RX ADMIN — METOPROLOL SUCCINATE 50 MG: 50 TABLET, EXTENDED RELEASE ORAL at 08:09

## 2021-09-28 RX ADMIN — ASPIRIN 81 MG: 81 TABLET, COATED ORAL at 08:09

## 2021-09-28 RX ADMIN — REGADENOSON 0.4 MG: 0.08 INJECTION, SOLUTION INTRAVENOUS at 10:09

## 2021-09-28 RX ADMIN — POTASSIUM CHLORIDE 40 MEQ: 1500 TABLET, EXTENDED RELEASE ORAL at 06:09

## 2021-09-28 RX ADMIN — ATORVASTATIN CALCIUM 40 MG: 40 TABLET, FILM COATED ORAL at 08:09

## 2021-09-30 LAB
BACTERIA UR CULT: ABNORMAL
BACTERIA UR CULT: ABNORMAL

## 2023-04-03 ENCOUNTER — HOSPITAL ENCOUNTER (INPATIENT)
Facility: HOSPITAL | Age: 73
LOS: 1 days | Discharge: HOME | DRG: 066 | End: 2023-04-05
Attending: EMERGENCY MEDICINE | Admitting: STUDENT IN AN ORGANIZED HEALTH CARE EDUCATION/TRAINING PROGRAM
Payer: COMMERCIAL

## 2023-04-03 DIAGNOSIS — R29.90 STROKE-LIKE SYMPTOMS: ICD-10-CM

## 2023-04-03 DIAGNOSIS — I63.9 STROKE: ICD-10-CM

## 2023-04-03 DIAGNOSIS — I63.9 CVA (CEREBRAL VASCULAR ACCIDENT): ICD-10-CM

## 2023-04-03 DIAGNOSIS — E78.5 DYSLIPIDEMIA: Primary | ICD-10-CM

## 2023-04-03 PROBLEM — R06.2 WHEEZING: Status: ACTIVE | Noted: 2023-04-03

## 2023-04-03 PROBLEM — R29.810 FACIAL DROOP: Status: ACTIVE | Noted: 2023-04-03

## 2023-04-03 LAB
ALBUMIN SERPL BCP-MCNC: 3.8 G/DL (ref 3.5–5.2)
ALP SERPL-CCNC: 95 U/L (ref 55–135)
ALT SERPL W/O P-5'-P-CCNC: 27 U/L (ref 10–44)
ANION GAP SERPL CALC-SCNC: 11 MMOL/L (ref 8–16)
ANION GAP SERPL CALC-SCNC: 12 MMOL/L (ref 8–16)
AST SERPL-CCNC: 24 U/L (ref 10–40)
BASOPHILS # BLD AUTO: 0.11 K/UL (ref 0–0.2)
BASOPHILS NFR BLD: 1 % (ref 0–1.9)
BILIRUB SERPL-MCNC: 0.9 MG/DL (ref 0.1–1)
BUN SERPL-MCNC: 17 MG/DL (ref 8–23)
BUN SERPL-MCNC: 21 MG/DL (ref 6–30)
CALCIUM SERPL-MCNC: 9.3 MG/DL (ref 8.7–10.5)
CHLORIDE SERPL-SCNC: 101 MMOL/L (ref 95–110)
CHLORIDE SERPL-SCNC: 104 MMOL/L (ref 95–110)
CHOLEST SERPL-MCNC: 132 MG/DL (ref 120–199)
CHOLEST/HDLC SERPL: 3.1 {RATIO} (ref 2–5)
CO2 SERPL-SCNC: 24 MMOL/L (ref 23–29)
CREAT SERPL-MCNC: 0.9 MG/DL (ref 0.5–1.4)
CREAT SERPL-MCNC: 1 MG/DL (ref 0.5–1.4)
CTP QC/QA: YES
CTP QC/QA: YES
DIFFERENTIAL METHOD: ABNORMAL
EOSINOPHIL # BLD AUTO: 0.6 K/UL (ref 0–0.5)
EOSINOPHIL NFR BLD: 5.6 % (ref 0–8)
ERYTHROCYTE [DISTWIDTH] IN BLOOD BY AUTOMATED COUNT: 14 % (ref 11.5–14.5)
EST. GFR  (NO RACE VARIABLE): 59 ML/MIN/1.73 M^2
GLUCOSE SERPL-MCNC: 144 MG/DL (ref 70–110)
GLUCOSE SERPL-MCNC: 145 MG/DL (ref 70–110)
HCT VFR BLD AUTO: 46.9 % (ref 37–48.5)
HCT VFR BLD CALC: 49 %PCV (ref 36–54)
HDLC SERPL-MCNC: 43 MG/DL (ref 40–75)
HDLC SERPL: 32.6 % (ref 20–50)
HGB BLD-MCNC: 15.3 G/DL (ref 12–16)
IMM GRANULOCYTES # BLD AUTO: 0.03 K/UL (ref 0–0.04)
IMM GRANULOCYTES NFR BLD AUTO: 0.3 % (ref 0–0.5)
INR PPP: 1.2 (ref 0.8–1.2)
LDLC SERPL CALC-MCNC: 66.2 MG/DL (ref 63–159)
LYMPHOCYTES # BLD AUTO: 2.6 K/UL (ref 1–4.8)
LYMPHOCYTES NFR BLD: 22.3 % (ref 18–48)
MCH RBC QN AUTO: 29.5 PG (ref 27–31)
MCHC RBC AUTO-ENTMCNC: 32.6 G/DL (ref 32–36)
MCV RBC AUTO: 91 FL (ref 82–98)
MONOCYTES # BLD AUTO: 1.1 K/UL (ref 0.3–1)
MONOCYTES NFR BLD: 9.3 % (ref 4–15)
NEUTROPHILS # BLD AUTO: 7.1 K/UL (ref 1.8–7.7)
NEUTROPHILS NFR BLD: 61.5 % (ref 38–73)
NONHDLC SERPL-MCNC: 89 MG/DL
NRBC BLD-RTO: 0 /100 WBC
PLATELET # BLD AUTO: 343 K/UL (ref 150–450)
PMV BLD AUTO: 10.2 FL (ref 9.2–12.9)
POC IONIZED CALCIUM: 1.2 MMOL/L (ref 1.06–1.42)
POC MOLECULAR INFLUENZA A AGN: NEGATIVE
POC MOLECULAR INFLUENZA B AGN: NEGATIVE
POC TCO2 (MEASURED): 30 MMOL/L (ref 23–29)
POCT GLUCOSE: 142 MG/DL (ref 70–110)
POTASSIUM BLD-SCNC: 3.7 MMOL/L (ref 3.5–5.1)
POTASSIUM SERPL-SCNC: 3.6 MMOL/L (ref 3.5–5.1)
PROT SERPL-MCNC: 7.9 G/DL (ref 6–8.4)
PROTHROMBIN TIME: 12.5 SEC (ref 9–12.5)
RBC # BLD AUTO: 5.18 M/UL (ref 4–5.4)
SAMPLE: ABNORMAL
SARS-COV-2 RDRP RESP QL NAA+PROBE: NEGATIVE
SODIUM BLD-SCNC: 138 MMOL/L (ref 136–145)
SODIUM SERPL-SCNC: 139 MMOL/L (ref 136–145)
TRIGL SERPL-MCNC: 114 MG/DL (ref 30–150)
TSH SERPL DL<=0.005 MIU/L-ACNC: 2.87 UIU/ML (ref 0.4–4)
WBC # BLD AUTO: 11.48 K/UL (ref 3.9–12.7)

## 2023-04-03 PROCEDURE — 85610 PROTHROMBIN TIME: CPT | Performed by: EMERGENCY MEDICINE

## 2023-04-03 PROCEDURE — G0378 HOSPITAL OBSERVATION PER HR: HCPCS

## 2023-04-03 PROCEDURE — G0426 PR INPT TELEHEALTH CONSULT 50M: ICD-10-PCS | Mod: 95,,, | Performed by: PSYCHIATRY & NEUROLOGY

## 2023-04-03 PROCEDURE — 85014 HEMATOCRIT: CPT

## 2023-04-03 PROCEDURE — 84295 ASSAY OF SERUM SODIUM: CPT

## 2023-04-03 PROCEDURE — 25000242 PHARM REV CODE 250 ALT 637 W/ HCPCS: Performed by: PHYSICIAN ASSISTANT

## 2023-04-03 PROCEDURE — 85025 COMPLETE CBC W/AUTO DIFF WBC: CPT | Performed by: EMERGENCY MEDICINE

## 2023-04-03 PROCEDURE — 93005 ELECTROCARDIOGRAM TRACING: CPT

## 2023-04-03 PROCEDURE — 84132 ASSAY OF SERUM POTASSIUM: CPT

## 2023-04-03 PROCEDURE — 80061 LIPID PANEL: CPT | Performed by: EMERGENCY MEDICINE

## 2023-04-03 PROCEDURE — 99285 EMERGENCY DEPT VISIT HI MDM: CPT | Mod: 25

## 2023-04-03 PROCEDURE — 82565 ASSAY OF CREATININE: CPT

## 2023-04-03 PROCEDURE — G0426 INPT/ED TELECONSULT50: HCPCS | Mod: 95,,, | Performed by: PSYCHIATRY & NEUROLOGY

## 2023-04-03 PROCEDURE — 25000003 PHARM REV CODE 250: Performed by: PHYSICIAN ASSISTANT

## 2023-04-03 PROCEDURE — 82330 ASSAY OF CALCIUM: CPT

## 2023-04-03 PROCEDURE — 99900035 HC TECH TIME PER 15 MIN (STAT)

## 2023-04-03 PROCEDURE — 84443 ASSAY THYROID STIM HORMONE: CPT | Performed by: EMERGENCY MEDICINE

## 2023-04-03 PROCEDURE — 93010 ELECTROCARDIOGRAM REPORT: CPT | Mod: ,,, | Performed by: INTERNAL MEDICINE

## 2023-04-03 PROCEDURE — 94640 AIRWAY INHALATION TREATMENT: CPT

## 2023-04-03 PROCEDURE — 80053 COMPREHEN METABOLIC PANEL: CPT | Performed by: EMERGENCY MEDICINE

## 2023-04-03 PROCEDURE — 25000003 PHARM REV CODE 250: Performed by: EMERGENCY MEDICINE

## 2023-04-03 PROCEDURE — 93010 EKG 12-LEAD: ICD-10-PCS | Mod: ,,, | Performed by: INTERNAL MEDICINE

## 2023-04-03 RX ORDER — CLOPIDOGREL 300 MG/1
600 TABLET, FILM COATED ORAL
Status: COMPLETED | OUTPATIENT
Start: 2023-04-03 | End: 2023-04-03

## 2023-04-03 RX ORDER — ASPIRIN 325 MG
325 TABLET ORAL
Status: COMPLETED | OUTPATIENT
Start: 2023-04-03 | End: 2023-04-03

## 2023-04-03 RX ORDER — CLOPIDOGREL BISULFATE 75 MG/1
75 TABLET ORAL DAILY
Status: DISCONTINUED | OUTPATIENT
Start: 2023-04-04 | End: 2023-04-05 | Stop reason: HOSPADM

## 2023-04-03 RX ORDER — AMOXICILLIN 250 MG
1 CAPSULE ORAL DAILY PRN
Status: DISCONTINUED | OUTPATIENT
Start: 2023-04-03 | End: 2023-04-05 | Stop reason: HOSPADM

## 2023-04-03 RX ORDER — PANTOPRAZOLE SODIUM 40 MG/1
40 TABLET, DELAYED RELEASE ORAL DAILY
COMMUNITY
Start: 2023-02-28

## 2023-04-03 RX ORDER — POTASSIUM CHLORIDE 750 MG/1
10 TABLET, EXTENDED RELEASE ORAL DAILY
COMMUNITY
Start: 2023-02-28

## 2023-04-03 RX ORDER — IPRATROPIUM BROMIDE AND ALBUTEROL SULFATE 2.5; .5 MG/3ML; MG/3ML
3 SOLUTION RESPIRATORY (INHALATION) EVERY 6 HOURS PRN
Status: DISCONTINUED | OUTPATIENT
Start: 2023-04-03 | End: 2023-04-04

## 2023-04-03 RX ORDER — METOPROLOL TARTRATE 1 MG/ML
5 INJECTION, SOLUTION INTRAVENOUS
Status: DISCONTINUED | OUTPATIENT
Start: 2023-04-03 | End: 2023-04-03

## 2023-04-03 RX ORDER — ASPIRIN 81 MG/1
81 TABLET ORAL DAILY
Status: DISCONTINUED | OUTPATIENT
Start: 2023-04-04 | End: 2023-04-05 | Stop reason: HOSPADM

## 2023-04-03 RX ORDER — VENLAFAXINE HYDROCHLORIDE 75 MG/1
75 CAPSULE, EXTENDED RELEASE ORAL DAILY
COMMUNITY
Start: 2023-02-28

## 2023-04-03 RX ORDER — IPRATROPIUM BROMIDE AND ALBUTEROL SULFATE 2.5; .5 MG/3ML; MG/3ML
3 SOLUTION RESPIRATORY (INHALATION) ONCE
Status: COMPLETED | OUTPATIENT
Start: 2023-04-03 | End: 2023-04-03

## 2023-04-03 RX ORDER — SODIUM CHLORIDE 0.9 % (FLUSH) 0.9 %
10 SYRINGE (ML) INJECTION
Status: DISCONTINUED | OUTPATIENT
Start: 2023-04-03 | End: 2023-04-05 | Stop reason: HOSPADM

## 2023-04-03 RX ORDER — ATORVASTATIN CALCIUM 40 MG/1
40 TABLET, FILM COATED ORAL DAILY
Status: DISCONTINUED | OUTPATIENT
Start: 2023-04-04 | End: 2023-04-04

## 2023-04-03 RX ORDER — ACETAMINOPHEN 500 MG
500 TABLET ORAL EVERY 6 HOURS PRN
Status: DISCONTINUED | OUTPATIENT
Start: 2023-04-03 | End: 2023-04-05 | Stop reason: HOSPADM

## 2023-04-03 RX ORDER — TALC
6 POWDER (GRAM) TOPICAL NIGHTLY PRN
Status: DISCONTINUED | OUTPATIENT
Start: 2023-04-03 | End: 2023-04-05 | Stop reason: HOSPADM

## 2023-04-03 RX ADMIN — ACETAMINOPHEN 500 MG: 500 TABLET ORAL at 10:04

## 2023-04-03 RX ADMIN — CLOPIDOGREL BISULFATE 600 MG: 300 TABLET, FILM COATED ORAL at 06:04

## 2023-04-03 RX ADMIN — IPRATROPIUM BROMIDE AND ALBUTEROL SULFATE 3 ML: 2.5; .5 SOLUTION RESPIRATORY (INHALATION) at 09:04

## 2023-04-03 RX ADMIN — ASPIRIN 325 MG ORAL TABLET 325 MG: 325 PILL ORAL at 06:04

## 2023-04-03 NOTE — HPI
74 y/o woman with history of CAD/MI, HTN who presents with facial droop.  Patient was at work when her coworkers noted L facial droop and mildly slurred speech.  She denies limb weakness.  No prior history of stroke.

## 2023-04-03 NOTE — CONSULTS
[facility]  Vascular Neurology  Comprehensive Stroke Center  Tele[facility]ascular Neurology Acute Consultation Note      Consults    Consulting Provider: NIKI MARCELO.  Current Providers  No providers found    Patient Location:  [facility] EMERGENCY DEPARTMENT Emergency Department  Spoke hospital nurse at bedside with patient assisting consultant.     Patient information was obtained from patient.         Assessment/Plan:       Diagnoses:   Neuro  Facial droop  74 y/o woman presenting with upper motor neuron Facial droop and Slight L arm weakness.  Outside the window for TPA.  Recommend admission for MRI brain, MRA head/neck, and further workup.    Load with Plavix 300.  Cont Plavix 75 mg daily.  ASA 81 mg daily  Atorvastatin 80  MRI brain, MRA head/neck  OT/Speech/PT        STROKE DOCUMENTATION     Acute Stroke Times:   Acute Stroke Times   Last Known Normal Date: 04/03/23  Last Known Normal Time: 1330  Symptom Onset Date: 04/03/23  Symptom Onset Time: 1500  Stroke Team Called Date: 04/03/23  Stroke Team Called Time: 1740  Stroke Team Arrival Date: 04/03/23  Stroke Team Arrival Time: 1755  CT Interpretation Time: 1755  Thrombolytic Therapy Recommended: No    NIH Scale:  1a. Level of Consciousness: 0-->Alert, keenly responsive  1b. LOC Questions: 0-->Answers both questions correctly  1c. LOC Commands: 0-->Performs both tasks correctly  2. Best Gaze: 0-->Normal  3. Visual: 0-->No visual loss  4. Facial Palsy: 1-->Minor paralysis (flattened nasolabial fold, asymmetry on smiling)  5a. Motor Arm, Left: 0-->No drift, limb holds 90 (or 45) degrees for full 10 secs  5b. Motor Arm, Right: 0-->No drift, limb holds 90 (or 45) degrees for full 10 secs  6a. Motor Leg, Left: 0-->No drift, leg holds 30 degree position for full 5 secs  6b. Motor Leg, Right: 0-->No drift, leg holds 30 degree position for full 5 secs  7. Limb Ataxia: 0-->Absent  8. Sensory: 0-->Normal, no sensory loss  9. Best Language:  "0-->No aphasia, normal  10. Dysarthria: 0-->Normal  11. Extinction and Inattention (formerly Neglect): 0-->No abnormality  Total (NIH Stroke Scale): 1     Modified [place]    Kali Coma Scale:    ABCD2 Score:    [id number] Score:   HAS -BLED Score:   ICH Score:   Hunt & Stokes Classification:       Blood pressure (!) 140/70, pulse (!) 57, temperature 97.7 °F (36.5 °C), temperature source Oral, resp. rate (!) 22, height 5' 6" (1.676 m), weight 103 kg (227 lb), SpO2 (!) 94 %.  Eligible for thrombolytic therapy?: No  Thrombolytic therapy recomended: Thrombolytic therapy not recommended due to Outside of treatment window   Possible Interventional Revascularization Candidate? No; at this time symptoms not suggestive of large vessel occlusion    Disposition Recommendation: admit to inpatient    Subjective:     History of Present Illness:  72 y/o woman with history of CAD/MI, HTN who presents with facial droop.  Patient was at work when her coworkers noted L facial droop and mildly slurred speech.  She denies limb weakness.  No prior history of stroke.      Woke up with symptoms?: no    Recent bleeding noted: no  Does the patient take any Blood Thinners? no  Medications: Antiplatelets:  aspirin      Past Medical History: hypertension and MI/CAD    Past Surgical History: no relevant surgical history    Family History: no relevant history    Social History: former smoker    Allergies: Talwin [Pentazocine Lactate]  Ace Inhibitors No relevant allergies    Review of Systems   Neurological: Positive for facial asymmetry.   All other systems reviewed and are negative.    Objective:   Vitals: Blood pressure (!) 140/70, pulse (!) 57, temperature 97.7 °F (36.5 °C), temperature source Oral, resp. rate (!) 22, height 5' 6" (1.676 m), weight 103 kg (227 lb), SpO2 (!) 94 %. Height: .    CT READ: Yes  No hemmorhage. No mass effect. No early infarct signs.     Physical Exam  Constitutional:       General: She is not in acute " distress.  HENT:      Head: Normocephalic and atraumatic.   Eyes:      Pupils: Pupils are equal, round, and reactive to light.   Pulmonary:      Effort: Pulmonary effort is normal.   Neurological:      Comments: Mild L facial.  No upper face weakness.  No L arm drift but orbits L arm  No sensory deficits             Recommended the emergency room physician to have a brief discussion with the patient and/or family if available regarding the  risks and benefits of treatment, and to briefly document the occurrence of that discussion in his clinical encounter note.     The attending portion of this evaluation, treatment, and documentation was performed per Galina Fernandez MD via audiovisual.    Billing code:  (moderate to severe stroke, large areas of edema, some mimics)      This patient has a critical neurological condition/illness, with high morbidity and mortality.  There is a high probability for acute neurological change leading to clinical and possibly life-threatening deterioration requiring highest level of physician preparedness for urgent intervention.  Care was coordinated with other physicians involved in the patient's care.  Radiologic studies and laboratory data were reviewed and interpreted, and plan of care was re-assessed based on the results.  Diagnosis, treatment options and prognosis may have been discussed with the patient and/or family members or caregiver.  Further advanced medical management and further evaluation is warranted for his care.    In your opinion, this was a: Tier 2 Van Negative    Consult End Time: 6:44 PM     Galina Fernandez MD  [facility]  Vascular Neurology   [facility]

## 2023-04-03 NOTE — SUBJECTIVE & OBJECTIVE
"  Woke up with symptoms?: no    Recent bleeding noted: no  Does the patient take any Blood Thinners? no  Medications: Antiplatelets:  aspirin      Past Medical History: hypertension and MI/CAD    Past Surgical History: no relevant surgical history    Family History: no relevant history    Social History: former smoker    Allergies: Talwin [Pentazocine Lactate]  Ace Inhibitors No relevant allergies    Review of Systems   Neurological: Positive for facial asymmetry.   All other systems reviewed and are negative.    Objective:   Vitals: Blood pressure (!) 140/70, pulse (!) 57, temperature 97.7 °F (36.5 °C), temperature source Oral, resp. rate (!) 22, height 5' 6" (1.676 m), weight 103 kg (227 lb), SpO2 (!) 94 %. Height: .    CT READ: Yes  No hemmorhage. No mass effect. No early infarct signs.     Physical Exam  Constitutional:       General: She is not in acute distress.  HENT:      Head: Normocephalic and atraumatic.   Eyes:      Pupils: Pupils are equal, round, and reactive to light.   Pulmonary:      Effort: Pulmonary effort is normal.   Neurological:      Comments: Mild L facial.  No upper face weakness.  No L arm drift but orbits L arm  No sensory deficits           "

## 2023-04-03 NOTE — FIRST PROVIDER EVALUATION
"Medical screening examination initiated.  I have conducted a focused provider triage encounter, findings are as follows:    Brief history of present illness:  This is a 73-year-old woman brought in by her daughter for slurred speech and confusion.  History is obtained by the daughter because the patient only reports that she is had a cough and flu-like symptoms.  According to the daughter the patient is a aide on a school bus and the  around 3:00 p.m. called saying that the patient was confused and was asking questions and sounded like her speech was slurred.    Vitals:    04/03/23 1733   BP: (!) 145/73   BP Location: Right arm   Patient Position: Sitting   Pulse: (!) 56   Resp: 18   Temp: 97.7 °F (36.5 °C)   TempSrc: Oral   SpO2: (!) 93%   Weight: 103 kg (227 lb)   Height: 5' 6" (1.676 m)       Pertinent physical exam:  No pronator drift.  There is slurring of the speech.  Able to hold legs against gravity for 10 seconds each.    Brief workup plan:  I have initiated a stroke workup with last seen normal around 3:00 p.m.    Preliminary workup initiated; this workup will be continued and followed by the physician or advanced practice provider that is assigned to the patient when roomed.  "

## 2023-04-03 NOTE — ED PROVIDER NOTES
Encounter Date: 4/3/2023       History     Chief Complaint   Patient presents with    Cerebrovascular Accident     Pt to ER with c/o right arm numbness and slurred speech. Pt noticed numbness in hand around 1500 but didn't noticed her slurred speech until her daughter came to her house about an hour ago. Pt also c/o SOB and cough     73 y.o. female Past Medical History:  No date: Anxiety  No date: Depression  No date: Heart attack      Comment:  12 years ago  2001: History of heart attack  No date: Hypertension     Pt noted by coworkers to have L facial droop and slurred speech sometime between 1:30 and 2, also had an episode where she bent down to pick something up and lost her balance.  Stroke alert called from triage.    Review of patient's allergies indicates:   Allergen Reactions    Talwin [pentazocine lactate] Shortness Of Breath    Ace inhibitors Other (See Comments)     cough     Past Medical History:   Diagnosis Date    Anxiety     Depression     Heart attack     12 years ago    History of heart attack     Hypertension      Past Surgical History:   Procedure Laterality Date     SECTION      EYE SURGERY Bilateral     CATARACT     Family History   Problem Relation Age of Onset    Diabetes Mother     Heart disease Mother     Hypertension Mother     Diabetes Father     Heart disease Father     Diabetes Sister     Heart disease Sister      Social History     Tobacco Use    Smoking status: Every Day     Packs/day: 0.50     Types: Cigarettes    Smokeless tobacco: Never   Substance Use Topics    Alcohol use: Yes     Comment: seldom    Drug use: No     Review of Systems   Constitutional:  Negative for fever.   HENT:  Negative for sore throat.    Respiratory:  Negative for shortness of breath.    Cardiovascular:  Negative for chest pain.   Gastrointestinal:  Negative for nausea.   Genitourinary:  Negative for dysuria.   Musculoskeletal:  Negative for back pain.   Skin:  Negative for rash.   Neurological:   Negative for weakness.   Hematological:  Does not bruise/bleed easily.   All other systems reviewed and are negative.    Physical Exam     Initial Vitals [04/03/23 1733]   BP Pulse Resp Temp SpO2   (!) 145/73 (!) 56 18 97.7 °F (36.5 °C) (!) 93 %      MAP       --         Physical Exam    Nursing note and vitals reviewed.  Constitutional: She appears well-developed and well-nourished.   HENT:   Head: Normocephalic and atraumatic.   Eyes: Conjunctivae and EOM are normal. Pupils are equal, round, and reactive to light.   Neck:   Normal range of motion.  Cardiovascular:  Normal rate.           Pulmonary/Chest: No respiratory distress.   Abdominal: She exhibits no distension.   Musculoskeletal:         General: Normal range of motion.      Cervical back: Normal range of motion.     Neurological: GCS score is 15. GCS eye subscore is 4. GCS verbal subscore is 5. GCS motor subscore is 6.   Skin: Skin is warm and dry.   Psychiatric: She has a normal mood and affect. Thought content normal.       ED Course   Procedures    MEDICAL DECISION MAKING    After review of the patient's physical exam, ED testing, and history/symptoms, relevant labs, imaging, available outside records  a wide differential was considered including but not limited to: infectious, traumatic, vascular, toxicological , metabolic, malignant, ischemic, embolic, psychological, genetic, iatrogenic, idiopathic, medication reaction, substance dependence/intoxication/withdrawal, electrolyte or blood dyscrasia, and other etiologies.        labs/imaging/interventions include:       Medications - No data to display  Labs Reviewed   POCT GLUCOSE - Abnormal; Notable for the following components:       Result Value    POCT Glucose 142 (*)     All other components within normal limits   ISTAT PROCEDURE - Abnormal; Notable for the following components:    POC Glucose 144 (*)     POC TCO2 (MEASURED) 30 (*)     All other components within normal limits   CBC W/ AUTO  DIFFERENTIAL   COMPREHENSIVE METABOLIC PANEL   PROTIME-INR   TSH   LIPID PANEL   POCT GLUCOSE, HAND-HELD DEVICE   POCT INFLUENZA A/B MOLECULAR   SARS-COV-2 RDRP GENE   ISTAT CHEM8   POCT PROTIME-INR      CT Head Without Contrast   Final Result      No acute intracranial abnormality detected at this time..  Mild chronic small vessel ischemic changes.  If persistent neurological symptoms, recommend MRI of the brain with diffusion-weighted sequencing.         Electronically signed by: Wandy Nichols   Date:    04/03/2023   Time:    17:50          Labs Reviewed   POCT GLUCOSE - Abnormal; Notable for the following components:       Result Value    POCT Glucose 142 (*)     All other components within normal limits   CBC W/ AUTO DIFFERENTIAL   COMPREHENSIVE METABOLIC PANEL   PROTIME-INR   TSH   LIPID PANEL   POCT GLUCOSE, HAND-HELD DEVICE   POCT INFLUENZA A/B MOLECULAR   SARS-COV-2 RDRP GENE   ISTAT CHEM8   POCT PROTIME-INR     EKG Readings: (Independently Interpreted)   Hr 59, sinus rula, nl axis/intervals, no nicolette/twi, non acute, no stemi.      Imaging Results              CT Head Without Contrast (Final result)  Result time 04/03/23 17:50:29      Final result by Wandy Nichols MD (04/03/23 17:50:29)                   Impression:      No acute intracranial abnormality detected at this time..  Mild chronic small vessel ischemic changes.  If persistent neurological symptoms, recommend MRI of the brain with diffusion-weighted sequencing.      Electronically signed by: Wandy Nichols  Date:    04/03/2023  Time:    17:50               Narrative:    EXAMINATION:  CT OF THE HEAD WITHOUT    CLINICAL HISTORY:  Slurred speech and confusion.  Cough.    TECHNIQUE:  5 mm unenhanced axial images were obtained from the skull base to the vertex.    COMPARISON:  None.    FINDINGS:  The ventricles, basal cisterns, and cortical sulci are within normal limits for patient's stated age.  Mild periventricular lucencies are present  "suggestive of chronic small vessel ischemic changes.  There is no acute intracranial hemorrhage, territorial infarct or mass effect, or midline shift. The visualized paranasal sinuses and mastoid air cells are clear. There is calcification along the anterior falx, which is a normal variant.                                       Medications - No data to display     Additional MDM:     NIH Stroke Scale:   Interval = baseline (upon arrival/admit)  Level of consciousness = 0 - alert  LOC questions = 0 - answers both correctly  LOC commands = 0 - performs both correctly  Best gaze = 0 - normal  Visual = 0 - no visual loss  Facial palsy = 2 - partial  Motor left arm =  0 - no drift  Motor right arm =  0 - no drift  Motor left leg = 0 - no drift  Motor right leg =  0 - no drift  Limb ataxia = 0 - absent  Sensory = 0 - normal  Best language = 0 - no aphasia  Dysarthria = 1 - mild to moderate dysarthria  Extinction and inattention = 0 - no neglect  NIH Stroke Scale Total = 3        ED Course as of 04/03/23 1754   Mon Apr 03, 2023   1732 Pt with numbness in rt hand since 3pm.  Slurred speech noticed by family member about 1 hour ago but was normal this morning. [MH]      ED Course User Index  [MH] Scott Ash MD         Subacute stroke out of window, neuro wants pt observed to   facilitate mri/mra and echo.    Called to room, pt with "shaking episode" per daughter, resolved prior to my entry without post ictal phase.       I have independently evaluated and interpreted all available labs and imaging to the extent of the scope of my practice.  After review of the patient's physical exam, ED testing, and history/symptoms, the patient requires additional care in the hospital overnight. The hospital medicine service  will accept the patient and relevant labs, imaging, or procedures were discussed. Pending labs/imaging/interventions. The diagnosis, treatment and plan were discussed with the patient. All questions or " concerns have been addressed.      Note was created using voice recognition software. Note may have occasional typographical or grammatical errors , garbled syntax, and other bizarre constructions that may not have been identified and edited despite good shakir initial review prior to signing.          Clinical Impression:   Final diagnoses:  [I63.9] Stroke               Marjorie Valdivia MD  04/03/23 1951

## 2023-04-03 NOTE — ASSESSMENT & PLAN NOTE
72 y/o woman presenting with upper motor neuron Facial droop and Slight L arm weakness.  Outside the window for TPA.  Recommend admission for MRI brain, MRA head/neck, and further workup.    Load with Plavix 300.  Cont Plavix 75 mg daily.  ASA 81 mg daily  Atorvastatin 80  MRI brain, MRA head/neck  OT/Speech/PT

## 2023-04-04 PROBLEM — E78.5 DYSLIPIDEMIA: Status: ACTIVE | Noted: 2023-04-04

## 2023-04-04 PROBLEM — R79.89 ELEVATED TROPONIN: Status: ACTIVE | Noted: 2023-04-04

## 2023-04-04 PROBLEM — J44.9 COPD (CHRONIC OBSTRUCTIVE PULMONARY DISEASE): Status: ACTIVE | Noted: 2023-04-03

## 2023-04-04 PROBLEM — J42 CHRONIC BRONCHITIS: Status: ACTIVE | Noted: 2023-04-03

## 2023-04-04 LAB
ALBUMIN SERPL BCP-MCNC: 3.5 G/DL (ref 3.5–5.2)
ALP SERPL-CCNC: 92 U/L (ref 55–135)
ALT SERPL W/O P-5'-P-CCNC: 29 U/L (ref 10–44)
ANION GAP SERPL CALC-SCNC: 14 MMOL/L (ref 8–16)
APTT PPP: 24.4 SEC (ref 21–32)
ASCENDING AORTA: 2.71 CM
AST SERPL-CCNC: 32 U/L (ref 10–40)
AV INDEX (PROSTH): 0.96
AV MEAN GRADIENT: 10 MMHG
AV PEAK GRADIENT: 17 MMHG
AV VALVE AREA: 2.32 CM2
AV VELOCITY RATIO: 0.85
BASOPHILS # BLD AUTO: 0.1 K/UL (ref 0–0.2)
BASOPHILS NFR BLD: 1 % (ref 0–1.9)
BILIRUB SERPL-MCNC: 1 MG/DL (ref 0.1–1)
BNP SERPL-MCNC: 258 PG/ML (ref 0–99)
BSA FOR ECHO PROCEDURE: 2.17 M2
BSA FOR ECHO PROCEDURE: 2.17 M2
BUN SERPL-MCNC: 15 MG/DL (ref 8–23)
CALCIUM SERPL-MCNC: 9 MG/DL (ref 8.7–10.5)
CHLORIDE SERPL-SCNC: 104 MMOL/L (ref 95–110)
CO2 SERPL-SCNC: 23 MMOL/L (ref 23–29)
CREAT SERPL-MCNC: 0.9 MG/DL (ref 0.5–1.4)
CV ECHO LV RWT: 0.7 CM
DIFFERENTIAL METHOD: ABNORMAL
DOP CALC AO PEAK VEL: 2.08 M/S
DOP CALC AO VTI: 42.1 CM
DOP CALC LVOT AREA: 2.4 CM2
DOP CALC LVOT DIAMETER: 1.75 CM
DOP CALC LVOT PEAK VEL: 1.77 M/S
DOP CALC LVOT STROKE VOLUME: 97.6 CM3
DOP CALCLVOT PEAK VEL VTI: 40.6 CM
E WAVE DECELERATION TIME: 206.35 MSEC
E/A RATIO: 1.27
E/E' RATIO: 17.07 M/S
ECHO LV POSTERIOR WALL: 1.25 CM (ref 0.6–1.1)
EJECTION FRACTION: 70 %
EOSINOPHIL # BLD AUTO: 0.9 K/UL (ref 0–0.5)
EOSINOPHIL NFR BLD: 8.8 % (ref 0–8)
ERYTHROCYTE [DISTWIDTH] IN BLOOD BY AUTOMATED COUNT: 13.8 % (ref 11.5–14.5)
EST. GFR  (NO RACE VARIABLE): >60 ML/MIN/1.73 M^2
ESTIMATED AVG GLUCOSE: 160 MG/DL (ref 68–131)
FRACTIONAL SHORTENING: 41 % (ref 28–44)
GLUCOSE SERPL-MCNC: 95 MG/DL (ref 70–110)
HBA1C MFR BLD: 7.2 % (ref 4–5.6)
HCT VFR BLD AUTO: 44.1 % (ref 37–48.5)
HGB BLD-MCNC: 14.7 G/DL (ref 12–16)
IMM GRANULOCYTES # BLD AUTO: 0.04 K/UL (ref 0–0.04)
IMM GRANULOCYTES NFR BLD AUTO: 0.4 % (ref 0–0.5)
INR PPP: 1.1 (ref 0.8–1.2)
INTERVENTRICULAR SEPTUM: 1.15 CM (ref 0.6–1.1)
IVC DIAMETER: 2.6 CM
IVRT: 102.76 MSEC
LA MAJOR: 5.63 CM
LA MINOR: 5.54 CM
LA WIDTH: 3.5 CM
LEFT ATRIUM SIZE: 3.65 CM
LEFT ATRIUM VOLUME INDEX: 28.9 ML/M2
LEFT ATRIUM VOLUME: 60.64 CM3
LEFT INTERNAL DIMENSION IN SYSTOLE: 2.12 CM (ref 2.1–4)
LEFT VENTRICLE DIASTOLIC VOLUME INDEX: 25.4 ML/M2
LEFT VENTRICLE DIASTOLIC VOLUME: 53.34 ML
LEFT VENTRICLE MASS INDEX: 67 G/M2
LEFT VENTRICLE SYSTOLIC VOLUME INDEX: 7.1 ML/M2
LEFT VENTRICLE SYSTOLIC VOLUME: 14.82 ML
LEFT VENTRICULAR INTERNAL DIMENSION IN DIASTOLE: 3.57 CM (ref 3.5–6)
LEFT VENTRICULAR MASS: 139.77 G
LV LATERAL E/E' RATIO: 14.22 M/S
LV SEPTAL E/E' RATIO: 21.33 M/S
LVOT MG: 8.43 MMHG
LVOT MV: 1.41 CM/S
LYMPHOCYTES # BLD AUTO: 3.2 K/UL (ref 1–4.8)
LYMPHOCYTES NFR BLD: 31 % (ref 18–48)
MAGNESIUM SERPL-MCNC: 2 MG/DL (ref 1.6–2.6)
MCH RBC QN AUTO: 29.9 PG (ref 27–31)
MCHC RBC AUTO-ENTMCNC: 33.3 G/DL (ref 32–36)
MCV RBC AUTO: 90 FL (ref 82–98)
MONOCYTES # BLD AUTO: 1.2 K/UL (ref 0.3–1)
MONOCYTES NFR BLD: 11.3 % (ref 4–15)
MV PEAK A VEL: 1.01 M/S
MV PEAK E VEL: 1.28 M/S
MV STENOSIS PRESSURE HALF TIME: 59.84 MS
MV VALVE AREA P 1/2 METHOD: 3.68 CM2
NEUTROPHILS # BLD AUTO: 5 K/UL (ref 1.8–7.7)
NEUTROPHILS NFR BLD: 47.5 % (ref 38–73)
NRBC BLD-RTO: 0 /100 WBC
PHOSPHATE SERPL-MCNC: 2.5 MG/DL (ref 2.7–4.5)
PISA TR MAX VEL: 2.56 M/S
PLATELET # BLD AUTO: 289 K/UL (ref 150–450)
PMV BLD AUTO: 10.4 FL (ref 9.2–12.9)
POTASSIUM SERPL-SCNC: 3.3 MMOL/L (ref 3.5–5.1)
PROT SERPL-MCNC: 6.8 G/DL (ref 6–8.4)
PROTHROMBIN TIME: 12 SEC (ref 9–12.5)
PULM VEIN S/D RATIO: 0.94
PV PEAK D VEL: 0.93 M/S
PV PEAK S VEL: 0.87 M/S
PV PEAK VELOCITY: 1.03 CM/S
RA MAJOR: 5.56 CM
RA PRESSURE: 8 MMHG
RBC # BLD AUTO: 4.91 M/UL (ref 4–5.4)
RIGHT VENTRICULAR END-DIASTOLIC DIMENSION: 3.59 CM
RV TISSUE DOPPLER FREE WALL SYSTOLIC VELOCITY 1 (APICAL 4 CHAMBER VIEW): 0.01 CM/S
SINUS: 2.72 CM
SODIUM SERPL-SCNC: 141 MMOL/L (ref 136–145)
STJ: 2.27 CM
TDI LATERAL: 0.09 M/S
TDI SEPTAL: 0.06 M/S
TDI: 0.08 M/S
TR MAX PG: 26 MMHG
TRICUSPID ANNULAR PLANE SYSTOLIC EXCURSION: 2.04 CM
TROPONIN I SERPL DL<=0.01 NG/ML-MCNC: 1.06 NG/ML (ref 0–0.03)
TROPONIN I SERPL DL<=0.01 NG/ML-MCNC: 1.71 NG/ML (ref 0–0.03)
TROPONIN I SERPL DL<=0.01 NG/ML-MCNC: 2.21 NG/ML (ref 0–0.03)
TV PEAK GRADIENT: 2.17 MMHG
TV REST PULMONARY ARTERY PRESSURE: 34 MMHG
WBC # BLD AUTO: 10.43 K/UL (ref 3.9–12.7)

## 2023-04-04 PROCEDURE — 36415 COLL VENOUS BLD VENIPUNCTURE: CPT | Performed by: PHYSICIAN ASSISTANT

## 2023-04-04 PROCEDURE — 84484 ASSAY OF TROPONIN QUANT: CPT | Mod: 91 | Performed by: NURSE PRACTITIONER

## 2023-04-04 PROCEDURE — 97161 PT EVAL LOW COMPLEX 20 MIN: CPT

## 2023-04-04 PROCEDURE — 25500020 PHARM REV CODE 255: Performed by: STUDENT IN AN ORGANIZED HEALTH CARE EDUCATION/TRAINING PROGRAM

## 2023-04-04 PROCEDURE — 84484 ASSAY OF TROPONIN QUANT: CPT | Performed by: PHYSICIAN ASSISTANT

## 2023-04-04 PROCEDURE — 85025 COMPLETE CBC W/AUTO DIFF WBC: CPT | Performed by: PHYSICIAN ASSISTANT

## 2023-04-04 PROCEDURE — G0426 INPT/ED TELECONSULT50: HCPCS | Mod: 95,,, | Performed by: STUDENT IN AN ORGANIZED HEALTH CARE EDUCATION/TRAINING PROGRAM

## 2023-04-04 PROCEDURE — 36415 COLL VENOUS BLD VENIPUNCTURE: CPT | Performed by: NURSE PRACTITIONER

## 2023-04-04 PROCEDURE — 83036 HEMOGLOBIN GLYCOSYLATED A1C: CPT | Performed by: PHYSICIAN ASSISTANT

## 2023-04-04 PROCEDURE — 99223 PR INITIAL HOSPITAL CARE,LEVL III: ICD-10-PCS | Mod: 25,,, | Performed by: INTERNAL MEDICINE

## 2023-04-04 PROCEDURE — 63600175 PHARM REV CODE 636 W HCPCS: Performed by: NURSE PRACTITIONER

## 2023-04-04 PROCEDURE — 85610 PROTHROMBIN TIME: CPT | Performed by: PHYSICIAN ASSISTANT

## 2023-04-04 PROCEDURE — 80053 COMPREHEN METABOLIC PANEL: CPT | Performed by: PHYSICIAN ASSISTANT

## 2023-04-04 PROCEDURE — G0426 PR INPT TELEHEALTH CONSULT 50M: ICD-10-PCS | Mod: 95,,, | Performed by: STUDENT IN AN ORGANIZED HEALTH CARE EDUCATION/TRAINING PROGRAM

## 2023-04-04 PROCEDURE — 92610 EVALUATE SWALLOWING FUNCTION: CPT

## 2023-04-04 PROCEDURE — 25000003 PHARM REV CODE 250: Performed by: PHYSICIAN ASSISTANT

## 2023-04-04 PROCEDURE — 97165 OT EVAL LOW COMPLEX 30 MIN: CPT

## 2023-04-04 PROCEDURE — 94640 AIRWAY INHALATION TREATMENT: CPT

## 2023-04-04 PROCEDURE — 21400001 HC TELEMETRY ROOM

## 2023-04-04 PROCEDURE — 25000003 PHARM REV CODE 250: Performed by: NURSE PRACTITIONER

## 2023-04-04 PROCEDURE — 83880 ASSAY OF NATRIURETIC PEPTIDE: CPT | Performed by: NURSE PRACTITIONER

## 2023-04-04 PROCEDURE — 83735 ASSAY OF MAGNESIUM: CPT | Performed by: PHYSICIAN ASSISTANT

## 2023-04-04 PROCEDURE — 84100 ASSAY OF PHOSPHORUS: CPT | Performed by: PHYSICIAN ASSISTANT

## 2023-04-04 PROCEDURE — 94760 N-INVAS EAR/PLS OXIMETRY 1: CPT

## 2023-04-04 PROCEDURE — 25000242 PHARM REV CODE 250 ALT 637 W/ HCPCS: Performed by: NURSE PRACTITIONER

## 2023-04-04 PROCEDURE — 25000242 PHARM REV CODE 250 ALT 637 W/ HCPCS: Performed by: PHYSICIAN ASSISTANT

## 2023-04-04 PROCEDURE — 85730 THROMBOPLASTIN TIME PARTIAL: CPT | Performed by: PHYSICIAN ASSISTANT

## 2023-04-04 PROCEDURE — 99223 1ST HOSP IP/OBS HIGH 75: CPT | Mod: 25,,, | Performed by: INTERNAL MEDICINE

## 2023-04-04 PROCEDURE — 94761 N-INVAS EAR/PLS OXIMETRY MLT: CPT

## 2023-04-04 RX ORDER — IPRATROPIUM BROMIDE AND ALBUTEROL SULFATE 2.5; .5 MG/3ML; MG/3ML
3 SOLUTION RESPIRATORY (INHALATION) EVERY 4 HOURS
Status: DISCONTINUED | OUTPATIENT
Start: 2023-04-04 | End: 2023-04-05 | Stop reason: HOSPADM

## 2023-04-04 RX ORDER — IPRATROPIUM BROMIDE AND ALBUTEROL SULFATE 2.5; .5 MG/3ML; MG/3ML
3 SOLUTION RESPIRATORY (INHALATION) EVERY 4 HOURS
Status: DISCONTINUED | OUTPATIENT
Start: 2023-04-04 | End: 2023-04-04

## 2023-04-04 RX ORDER — ATORVASTATIN CALCIUM 40 MG/1
80 TABLET, FILM COATED ORAL DAILY
Status: DISCONTINUED | OUTPATIENT
Start: 2023-04-05 | End: 2023-04-05 | Stop reason: HOSPADM

## 2023-04-04 RX ORDER — SODIUM CHLORIDE 9 MG/ML
INJECTION, SOLUTION INTRAVENOUS CONTINUOUS
Status: DISCONTINUED | OUTPATIENT
Start: 2023-04-04 | End: 2023-04-04

## 2023-04-04 RX ORDER — PREDNISONE 20 MG/1
40 TABLET ORAL DAILY
Status: DISCONTINUED | OUTPATIENT
Start: 2023-04-05 | End: 2023-04-05 | Stop reason: HOSPADM

## 2023-04-04 RX ORDER — GUAIFENESIN 600 MG/1
600 TABLET, EXTENDED RELEASE ORAL 2 TIMES DAILY
Status: DISCONTINUED | OUTPATIENT
Start: 2023-04-04 | End: 2023-04-05 | Stop reason: HOSPADM

## 2023-04-04 RX ORDER — BENZONATATE 100 MG/1
100 CAPSULE ORAL 3 TIMES DAILY PRN
Status: DISCONTINUED | OUTPATIENT
Start: 2023-04-04 | End: 2023-04-05 | Stop reason: HOSPADM

## 2023-04-04 RX ORDER — POTASSIUM CHLORIDE 20 MEQ/1
40 TABLET, EXTENDED RELEASE ORAL ONCE
Status: COMPLETED | OUTPATIENT
Start: 2023-04-04 | End: 2023-04-04

## 2023-04-04 RX ADMIN — ASPIRIN 81 MG: 81 TABLET, COATED ORAL at 09:04

## 2023-04-04 RX ADMIN — GUAIFENESIN 600 MG: 600 TABLET, EXTENDED RELEASE ORAL at 09:04

## 2023-04-04 RX ADMIN — IPRATROPIUM BROMIDE AND ALBUTEROL SULFATE 3 ML: 2.5; .5 SOLUTION RESPIRATORY (INHALATION) at 08:04

## 2023-04-04 RX ADMIN — GUAIFENESIN 600 MG: 600 TABLET, EXTENDED RELEASE ORAL at 01:04

## 2023-04-04 RX ADMIN — CLOPIDOGREL BISULFATE 75 MG: 75 TABLET ORAL at 09:04

## 2023-04-04 RX ADMIN — IPRATROPIUM BROMIDE AND ALBUTEROL SULFATE 3 ML: 2.5; .5 SOLUTION RESPIRATORY (INHALATION) at 04:04

## 2023-04-04 RX ADMIN — IPRATROPIUM BROMIDE AND ALBUTEROL SULFATE 3 ML: 2.5; .5 SOLUTION RESPIRATORY (INHALATION) at 05:04

## 2023-04-04 RX ADMIN — BENZONATATE 100 MG: 100 CAPSULE ORAL at 09:04

## 2023-04-04 RX ADMIN — POTASSIUM CHLORIDE 40 MEQ: 1500 TABLET, EXTENDED RELEASE ORAL at 10:04

## 2023-04-04 RX ADMIN — SODIUM CHLORIDE: 9 INJECTION, SOLUTION INTRAVENOUS at 10:04

## 2023-04-04 RX ADMIN — METHYLPREDNISOLONE SODIUM SUCCINATE 80 MG: 40 INJECTION, POWDER, FOR SOLUTION INTRAMUSCULAR; INTRAVENOUS at 10:04

## 2023-04-04 RX ADMIN — IPRATROPIUM BROMIDE AND ALBUTEROL SULFATE 3 ML: 2.5; .5 SOLUTION RESPIRATORY (INHALATION) at 11:04

## 2023-04-04 RX ADMIN — IOHEXOL 75 ML: 350 INJECTION, SOLUTION INTRAVENOUS at 11:04

## 2023-04-04 RX ADMIN — ATORVASTATIN CALCIUM 40 MG: 40 TABLET, FILM COATED ORAL at 09:04

## 2023-04-04 NOTE — SUBJECTIVE & OBJECTIVE
Past Medical History:   Diagnosis Date    Anxiety     Depression     Heart attack     12 years ago    History of heart attack     Hypertension        Past Surgical History:   Procedure Laterality Date     SECTION      EYE SURGERY Bilateral     CATARACT       Review of patient's allergies indicates:   Allergen Reactions    Talwin [pentazocine lactate] Shortness Of Breath    Ace inhibitors Other (See Comments)     cough       No current facility-administered medications on file prior to encounter.     Current Outpatient Medications on File Prior to Encounter   Medication Sig    albuterol (VENTOLIN HFA) 90 mcg/actuation inhaler Inhale 2 puffs into the lungs every 4 (four) hours as needed for Wheezing or Shortness of Breath. Rescue    aspirin (ECOTRIN) 325 MG EC tablet Take 325 mg by mouth once daily.    atorvastatin (LIPITOR) 40 MG tablet Take 40 mg by mouth once daily.    EPINEPHrine (EPIPEN JR) 0.15 mg/0.3 mL pen injection Inject 0.15 mg into the muscle.    famotidine (PEPCID) 20 MG tablet Take 20 mg by mouth 2 (two) times daily.    fluticasone propionate (FLONASE) 50 mcg/actuation nasal spray 1 spray by Each Nostril route once daily.    hydrochlorothiazide (HYDRODIURIL) 25 MG tablet Take 25 mg by mouth once daily.    metoprolol tartrate (LOPRESSOR) 50 MG tablet Take 50 mg by mouth 2 (two) times daily.    nitroGLYCERIN (NITROSTAT) 0.4 MG SL tablet Place 0.4 mg under the tongue.    pantoprazole (PROTONIX) 40 MG tablet Take 40 mg by mouth Daily.    potassium chloride (KLOR-CON) 10 MEQ TbSR Take 10 mEq by mouth Daily.    venlafaxine (EFFEXOR-XR) 150 MG Cp24 Take 150 mg by mouth once daily. TAKE ONE CAPSULE BY MOUTH EVERY MORNING. TAKE with 150mg  total dose OF 225mg    venlafaxine (EFFEXOR-XR) 75 MG 24 hr capsule Take 75 mg by mouth Daily.     Family History       Problem Relation (Age of Onset)    Diabetes Mother, Father, Sister    Heart disease Mother, Father, Sister    Hypertension Mother          Tobacco  Use    Smoking status: Every Day     Packs/day: 0.50     Types: Cigarettes    Smokeless tobacco: Never   Substance and Sexual Activity    Alcohol use: Yes     Comment: seldom    Drug use: No    Sexual activity: Not Currently     Review of Systems   Constitutional:  Negative for chills and fever.   HENT:  Negative for nosebleeds and tinnitus.    Eyes:  Negative for photophobia and visual disturbance.   Respiratory:  Negative for shortness of breath and wheezing.    Cardiovascular:  Negative for chest pain, palpitations and leg swelling.   Gastrointestinal:  Negative for abdominal distention, nausea and vomiting.   Genitourinary:  Negative for dysuria, flank pain and hematuria.   Musculoskeletal:  Negative for gait problem and joint swelling.   Skin:  Negative for rash and wound.   Neurological:  Positive for facial asymmetry and speech difficulty. Negative for seizures and syncope.   Objective:     Vital Signs (Most Recent):  Temp: 97.7 °F (36.5 °C) (04/03/23 1733)  Pulse: (!) 57 (04/03/23 2002)  Resp: (!) 22 (04/03/23 2002)  BP: 130/61 (04/03/23 2002)  SpO2: (!) 94 % (04/03/23 2002)   Vital Signs (24h Range):  Temp:  [97.7 °F (36.5 °C)] 97.7 °F (36.5 °C)  Pulse:  [50-58] 57  Resp:  [18-25] 22  SpO2:  [93 %-95 %] 94 %  BP: (130-169)/(61-83) 130/61     Weight: 103 kg (227 lb)  Body mass index is 36.64 kg/m².    Physical Exam  Vitals and nursing note reviewed.   Constitutional:       General: She is not in acute distress.     Appearance: She is well-developed. She is not diaphoretic.   HENT:      Head: Normocephalic and atraumatic.      Right Ear: External ear normal.      Left Ear: External ear normal.   Eyes:      General:         Right eye: No discharge.         Left eye: No discharge.      Conjunctiva/sclera: Conjunctivae normal.   Neck:      Thyroid: No thyromegaly.   Cardiovascular:      Rate and Rhythm: Normal rate and regular rhythm.      Heart sounds: No murmur heard.  Pulmonary:      Effort: Pulmonary effort  is normal. No respiratory distress.      Breath sounds: Normal breath sounds.   Abdominal:      General: Bowel sounds are normal. There is no distension.      Palpations: Abdomen is soft. There is no mass.      Tenderness: There is no abdominal tenderness.   Musculoskeletal:         General: No deformity.      Cervical back: Normal range of motion and neck supple.   Skin:     General: Skin is warm and dry.   Neurological:      Mental Status: She is alert and oriented to person, place, and time.      Comments: Left sided facial droop with slurring of words. Symmetric eye brow raise. 5/5 strength BUE and BLE. No sensation deficits to arm or leg.    Psychiatric:         Mood and Affect: Mood normal.         Behavior: Behavior normal.           Significant Labs: CBC:   Recent Labs   Lab 04/03/23 1754 04/03/23 1754   WBC 11.48  --    HGB 15.3  --    HCT 46.9 49     --      CMP:   Recent Labs   Lab 04/03/23  1754      K 3.6      CO2 24   *   BUN 17   CREATININE 1.0   CALCIUM 9.3   PROT 7.9   ALBUMIN 3.8   BILITOT 0.9   ALKPHOS 95   AST 24   ALT 27   ANIONGAP 11     Coagulation:   Recent Labs   Lab 04/03/23  1754   INR 1.2     Lipid Panel:   Recent Labs   Lab 04/03/23  1754   CHOL 132   HDL 43   LDLCALC 66.2   TRIG 114   CHOLHDL 32.6     TSH:   Recent Labs   Lab 04/03/23  1754   TSH 2.871       Significant Imaging:   Imaging Results              CT Head Without Contrast (Final result)  Result time 04/03/23 17:50:29      Final result by Wandy Nichols MD (04/03/23 17:50:29)                   Impression:      No acute intracranial abnormality detected at this time..  Mild chronic small vessel ischemic changes.  If persistent neurological symptoms, recommend MRI of the brain with diffusion-weighted sequencing.      Electronically signed by: Wandy Nichols  Date:    04/03/2023  Time:    17:50               Narrative:    EXAMINATION:  CT OF THE HEAD WITHOUT    CLINICAL HISTORY:  Slurred speech  and confusion.  Cough.    TECHNIQUE:  5 mm unenhanced axial images were obtained from the skull base to the vertex.    COMPARISON:  None.    FINDINGS:  The ventricles, basal cisterns, and cortical sulci are within normal limits for patient's stated age.  Mild periventricular lucencies are present suggestive of chronic small vessel ischemic changes.  There is no acute intracranial hemorrhage, territorial infarct or mass effect, or midline shift. The visualized paranasal sinuses and mastoid air cells are clear. There is calcification along the anterior falx, which is a normal variant.

## 2023-04-04 NOTE — PT/OT/SLP EVAL
Physical Therapy Evaluation    Patient Name:  Ivet Crabtree   MRN:  [id number]    Recommendations:     Discharge Recommendations: outpatient PT   Discharge Equipment Recommendations: none   Barriers to discharge: None    Assessment:     Ivet Crabtree is a 73 y.o. female admitted with a medical diagnosis of Acute CVA (cerebrovascular accident).  She presents with the following impairments/functional limitations: weakness, impaired endurance, impaired functional mobility, impaired balance.    Rehab Prognosis: Good; patient would benefit from acute skilled PT services to address these deficits and reach maximum level of function.    Recent Surgery: * No surgery found *      Plan:     During this hospitalization, patient to be seen 2 x/week to address the identified rehab impairments via gait training, therapeutic activities, therapeutic exercises and progress toward the following goals:    Plan of Care Expires:  04/18/23    Subjective     Chief Complaint: generalized weakness  Patient/Family Comments/goals: Pt agreeable to therapy.   Pain/Comfort:  Pain Rating 1: 0/10    Living Environment:  Pt lives alone in a [facility] with no concerns at entry.   Prior to admission, patients level of function was independent and working as a .  Equipment at home: raised toilet, walk-in shower with built-in seat, and grab bars.  Upon discharge, patient will have assistance from 2 dtrs.    Objective:     Patient found HOB elevated with peripheral IV, telemetry upon PT entry to room.    General Precautions: Standard, fall  Orthopedic Precautions:N/A   Braces: N/A  Respiratory Status: Room air    Exams:  Cognitive Exam:  Patient was able to follow multiple commands.   Gross Motor Coordination:  WFL  Postural Exam:  Patient presented with the following abnormalities:    -       Rounded shoulders  -       Forward head  -       Obesity  Sensation:    -       Intact  light/touch BLE  Skin Integrity/Edema:      -       Skin  integrity: Visible skin intact  -       Edema: None noted BLE  BLE ROM: WNL  BLE Strength: WNL    Functional Mobility: Pt with L facial droop.  Pt with +wheezing, spO2 on RA 94% and HR 66 bpm.   Bed Mobility:     Scooting: modified independence  Supine to Sit: modified independence  Transfers:     Sit to Stand: stand by assistance with no AD  Gait: Pt ambulated ~200 ft with CGA-SBA using no AD.  Pt with 1LOB and c/o weakness toward end of gait.  Pt able to self-recover.  Pt with decreased step length and kathleen.    Balance: Pt with fair+ dynamic standing balance.       AM-PAC 6 CLICK MOBILITY  Total Score:22       Treatment & Education:  Pt/dtr educated on acute skilled PT services and goals.  Pt to continue ambulating with family/nursing supervision while in the hospital.  They verbalized good understanding.     Patient left sitting edge of bed with all lines intact, call button in reach, and dtr and lab present for blood drawn.     GOALS:   Multidisciplinary Problems       Physical Therapy Goals          Problem: Physical Therapy    Goal Priority Disciplines Outcome Goal Variances Interventions   Physical Therapy Goal     PT, PT/OT Ongoing, Progressing     Description: Goals to be met by: 23     Patient will increase functional independence with mobility by performin. Supine to sit with Marshall  2. Rolling to Left and Right with Marshall  3. Sit to stand transfer with Marshall  4. Bed to chair transfer with Marshall   5. Gait x500 feet with Marshall using No Assistive Device   6. Lower extremity exercise program 2 sets x15 reps per handout, with independence                         History:     Past Medical History:   Diagnosis Date    Anxiety     Depression     Heart attack     12 years ago    History of heart attack     Hypertension        Past Surgical History:   Procedure Laterality Date     SECTION      EYE SURGERY Bilateral     CATARACT       Time Tracking:      PT Received On: 04/04/23  PT Start Time: 1025     PT Stop Time: 1038  PT Total Time (min): 13 min     Billable Minutes: Evaluation 13 min co-eval with OT      04/04/2023

## 2023-04-04 NOTE — ASSESSMENT & PLAN NOTE
Trop 2, ?r/t CVA  No anginal sxs.  EKG/echo normal.  Prior hx MI 2001 with nonobst CAD by cath at that time.  Neg MPI 9/2021.  Doubt ACS.  Consider outpat MPI after she recovers from CVA.

## 2023-04-04 NOTE — SUBJECTIVE & OBJECTIVE
Past Medical History:   Diagnosis Date    Anxiety     Depression     Heart attack     12 years ago    History of heart attack     Hypertension        Past Surgical History:   Procedure Laterality Date     SECTION      EYE SURGERY Bilateral     CATARACT       Review of patient's allergies indicates:   Allergen Reactions    Talwin [pentazocine lactate] Shortness Of Breath    Ace inhibitors Other (See Comments)     cough       No current facility-administered medications on file prior to encounter.     Current Outpatient Medications on File Prior to Encounter   Medication Sig    albuterol (VENTOLIN HFA) 90 mcg/actuation inhaler Inhale 2 puffs into the lungs every 4 (four) hours as needed for Wheezing or Shortness of Breath. Rescue    aspirin (ECOTRIN) 325 MG EC tablet Take 325 mg by mouth once daily.    atorvastatin (LIPITOR) 40 MG tablet Take 40 mg by mouth once daily.    EPINEPHrine (EPIPEN JR) 0.15 mg/0.3 mL pen injection Inject 0.15 mg into the muscle.    famotidine (PEPCID) 20 MG tablet Take 20 mg by mouth 2 (two) times daily.    fluticasone propionate (FLONASE) 50 mcg/actuation nasal spray 1 spray by Each Nostril route once daily.    hydrochlorothiazide (HYDRODIURIL) 25 MG tablet Take 25 mg by mouth once daily.    metoprolol tartrate (LOPRESSOR) 50 MG tablet Take 50 mg by mouth 2 (two) times daily.    nitroGLYCERIN (NITROSTAT) 0.4 MG SL tablet Place 0.4 mg under the tongue.    pantoprazole (PROTONIX) 40 MG tablet Take 40 mg by mouth Daily.    potassium chloride (KLOR-CON) 10 MEQ TbSR Take 10 mEq by mouth Daily.    venlafaxine (EFFEXOR-XR) 150 MG Cp24 Take 150 mg by mouth once daily. TAKE ONE CAPSULE BY MOUTH EVERY MORNING. TAKE with 150mg  total dose OF 225mg    venlafaxine (EFFEXOR-XR) 75 MG 24 hr capsule Take 75 mg by mouth Daily.     Family History       Problem Relation (Age of Onset)    Diabetes Mother, Father, Sister    Heart disease Mother, Father, Sister    Hypertension Mother          Tobacco  Use    Smoking status: Every Day     Packs/day: 0.50     Types: Cigarettes    Smokeless tobacco: Never   Substance and Sexual Activity    Alcohol use: Yes     Comment: seldom    Drug use: No    Sexual activity: Not Currently     Review of Systems   Constitutional: Negative for chills, diaphoresis, fever and malaise/fatigue.   HENT:  Negative for nosebleeds.    Eyes:  Negative for blurred vision and double vision.   Cardiovascular:  Negative for chest pain, claudication, cyanosis, dyspnea on exertion, leg swelling, orthopnea, palpitations, paroxysmal nocturnal dyspnea and syncope.   Respiratory:  Negative for cough, shortness of breath and wheezing.    Skin:  Negative for dry skin and poor wound healing.   Musculoskeletal:  Negative for back pain, joint swelling and myalgias.   Gastrointestinal:  Negative for abdominal pain, nausea and vomiting.   Genitourinary:  Negative for hematuria.   Neurological:  Negative for dizziness, headaches, numbness, seizures and weakness.   Psychiatric/Behavioral:  Negative for altered mental status and depression.    Objective:     Vital Signs (Most Recent):  Temp: 98.6 °F (37 °C) (04/04/23 1121)  Pulse: (!) 57 (04/04/23 1121)  Resp: 19 (04/04/23 1121)  BP: (!) 161/73 (04/04/23 1121)  SpO2: 97 % (04/04/23 1121)   Vital Signs (24h Range):  Temp:  [97.6 °F (36.4 °C)-98.6 °F (37 °C)] 98.6 °F (37 °C)  Pulse:  [50-72] 57  Resp:  [18-25] 19  SpO2:  [93 %-97 %] 97 %  BP: (130-170)/(61-86) 161/73     Weight: 101.2 kg (223 lb 1.7 oz)  Body mass index is 36.01 kg/m².    SpO2: 97 %       No intake or output data in the 24 hours ending 04/04/23 1415    Lines/Drains/Airways       Peripheral Intravenous Line  Duration                  Peripheral IV - Single Lumen 04/03/23 1753 20 G Right Antecubital <1 day                    Physical Exam  Constitutional:       General: She is not in acute distress.     Appearance: She is well-developed. She is obese. She is not ill-appearing, toxic-appearing or  diaphoretic.   HENT:      Head: Normocephalic and atraumatic.   Eyes:      General: No scleral icterus.     Extraocular Movements: Extraocular movements intact.      Conjunctiva/sclera: Conjunctivae normal.      Pupils: Pupils are equal, round, and reactive to light.   Neck:      Vascular: No carotid bruit or JVD.      Trachea: No tracheal deviation.   Cardiovascular:      Rate and Rhythm: Normal rate and regular rhythm.      Pulses:           Carotid pulses are 2+ on the right side and 2+ on the left side.     Heart sounds: S1 normal and S2 normal. No murmur heard.    No friction rub. No gallop.   Pulmonary:      Effort: Pulmonary effort is normal. No respiratory distress.      Breath sounds: Normal breath sounds. No stridor. No wheezing, rhonchi or rales.   Chest:      Chest wall: No tenderness.   Abdominal:      General: There is no distension.      Palpations: Abdomen is soft.   Musculoskeletal:         General: No swelling or tenderness. Normal range of motion.      Cervical back: Normal range of motion and neck supple. No rigidity.      Right lower leg: No edema.      Left lower leg: No edema.   Skin:     General: Skin is warm and dry.      Coloration: Skin is not jaundiced.   Neurological:      General: No focal deficit present.      Mental Status: She is alert and oriented to person, place, and time.      Cranial Nerves: No cranial nerve deficit.   Psychiatric:         Mood and Affect: Mood normal.         Behavior: Behavior normal.       Current Medications:   albuterol-ipratropium  3 mL Nebulization Q4H    aspirin  81 mg Oral Daily    atorvastatin  40 mg Oral Daily    clopidogreL  75 mg Oral Daily    guaiFENesin  600 mg Oral BID    [START ON 4/5/2023] predniSONE  40 mg Oral Daily       acetaminophen, benzonatate, melatonin, senna-docusate 8.6-50 mg, sodium chloride 0.9%    Laboratory (all labs reviewed):  CBC:  Recent Labs   Lab 09/27/21  0725 09/28/21  0335 04/03/23  1754 04/03/23  1754 04/04/23  0445    WBC 12.63 10.77 11.48  --  10.43   Hemoglobin 16.4 H 15.4 15.3  --  14.7   POC Hematocrit  --   --   --  49  --    Hematocrit 47.4 46.7 46.9  --  44.1   Platelets 280 296 343  --  289       CHEMISTRIES:  Recent Labs   Lab 09/27/21  0725 09/28/21  0335 04/03/23  1754 04/04/23  0449   Glucose 188 H 115 H 145 H 95   Sodium 141 143 139 141   Potassium 3.0 L 3.4 L 3.6 3.3 L   BUN 20 14 17 15   Creatinine 0.8 0.8 1.0 0.9   eGFR if non  >60 >60  --   --    eGFR  --   --  59 A >60   Calcium 9.9 9.6 9.3 9.0   Magnesium 2.1 2.2  --  2.0       CARDIAC BIOMARKERS:  Recent Labs   Lab 09/28/21  0335 09/28/21  0737 09/28/21  1329 04/04/23  0449 04/04/23  1041   Troponin I 0.080 H 0.071 H 0.052 H 1.714 H 2.211 H       COAGS:  Recent Labs   Lab 04/03/23  1754 04/04/23  0449   INR 1.2 1.1       LIPIDS/LFTS:  Recent Labs   Lab 09/27/21  0725 09/27/21  1505 09/28/21  0335 04/03/23  1754 04/04/23  0449   Cholesterol  --  122  --  132  --    Triglycerides  --  88  --  114  --    HDL  --  46  --  43  --    LDL Cholesterol  --  58.4 L  --  66.2  --    Non-HDL Cholesterol  --  76  --  89  --    AST 19  --  22 24 32   ALT 30  --  29 27 29       BNP:  Recent Labs   Lab 04/04/23  1041    H       TSH:  Recent Labs   Lab 09/27/21  1505 04/03/23  1754   TSH 2.311 2.871       Free T4:        Diagnostic Results:  ECG (personally reviewed and interpreted tracing(s)):  4/3/23 1751 SR 59, low volt    CTA head/neck 4/4/23  1. Continued evolution of recent infarction in the right insula and adjoining white matter, as seen to better advantage on prior MR imaging of 04/03/2023.  No definite macroscopic hemorrhage.  No definite intercurrent ischemia additional vascular territories by noncontrast CT.  2. Occlusion of (RIGHT) proximal superior division M2 branch within the sylvian fissure.  There is some degree of thready opacification of more distal MCA branches, remaining diminutive in caliber when compared to the contralateral  side.  3. Elsewhere, no additional areas of acute large vessel occlusion or flow-limiting stenosis.  Details of cervical and intracranial atherosclerotic disease, as provided in the body of the report.  4. Nonspecific sub 5 mm solid pulmonary nodules.  For multiple solid nodules all <6 mm, Fleischner Society 2017 guidelines recommend no routine follow up for a low risk patient, or follow up with non-contrast chest CT at 12 months after discovery in a high risk patient.    Echo: 4/4/23 (images personally reviewed and interpreted) Bubble study ordered  The left ventricle is normal in size with mild concentric hypertrophy and normal systolic function.  The estimated ejection fraction is 70%.  Normal right ventricular size with normal right ventricular systolic function.  The estimated PA systolic pressure is 34 mmHg.  No intracardiac source of embolus noted on this exam. If high clinical suspicion, consider SAI +/- bubble study.    Holter 3/20/23 (Care everywhere)  Maximum heart rate  115 bpm   Minimum heart rate  45 bpm   Average heart rate  57 bpm   Sinus rhythm   PVCs - 162 with 2 VE couplets and one 9 beat run of VTACH @ 154 bpm   SVEs - 374 with 8 episodes of SVT - longest 10beats, fastest 128 bpm   Ischemia: Negative   Arrhythmias:  Positive for VTACH and SVT    Stress Test: L MPI 9/28/21    Normal myocardial perfusion scan. There is no evidence of myocardial ischemia or infarction.    There is a  mild intensity fixed defect in the apical wall of the left ventricle secondary to diaphragm attenuation.    The gated perfusion images showed an ejection fraction of 83% post stress.    There is normal wall motion post stress.    The EKG portion of this study is negative for ischemia.    The patient reported chest pain during the stress test.    There were no arrhythmias during stress.    Cath 4/2001 (care everywhere Dr. Carney note 8/31/22)  ADMITTED WITH AN INFERIOR POSTERIOR MI, UNDERWENT ANGIOGRAPHY THAT SHOWED  MILD CORONARY ARTERY DISEASE, MEDICAL THERAPY RECOMMENDED.

## 2023-04-04 NOTE — HPI
Ivet Crabtree 73 y.o. female with CAD, HTN, COPD, anxiety presents to the hospital with a chief complaint of facial droop.  She reports today at 1:30 p.m. she was informed her daughter she had a facial droop and slurred speech.  The symptoms remained constant without aggravating alleviating factors.  They have never occurred before.  She is compliant with home aspirin and statin.  She no longer smokes.  She denies numbness weakness of extremity incontinence visual disturbance leg swelling melena hematuria hematemesis, hearing changes tinnitus seizures.    In the ED, seen by tele Neurology not a candidate for tPA CT head without acute intracranial abnormality afebrile without leukocytosis INR 1.2 LDL 66 TSH within normal limits COVID negative flu negative.

## 2023-04-04 NOTE — NURSING
Pt arrive to the unit by bed accompanied by transport.  Assisted pt to bed. Tele monitoring initiated.  Admit assessment initiated.  Pt is AAO.  NO distress noted.

## 2023-04-04 NOTE — H&P
[facility]  [facility]  History & Physical    Patient Name: Ivet Crabtree  MRN: [id number]  Patient Class: OP- Observation  Admission Date: 4/3/2023  Attending Physician: Yannick Self MD   Primary Care Provider: Zoie Garcia MD         Patient information was obtained from patient, past medical records and ER records.     Subjective:     Principal Problem:Facial droop    Chief Complaint:   Chief Complaint   Patient presents with    Cerebrovascular Accident     Pt to ER with c/o right arm numbness and slurred speech. Pt noticed numbness in hand around 1500 but didn't noticed her slurred speech until her daughter came to her house about an hour ago. Pt also c/o SOB and cough        HPI: Ivet Crabtree 73 y.o. female with CAD, HTN, COPD, anxiety presents to the hospital with a chief complaint of facial droop.  She reports today at 1:30 p.m. she was informed her daughter she had a facial droop and slurred speech.  The symptoms remained constant without aggravating alleviating factors.  They have never occurred before.  She is compliant with home aspirin and statin.  She no longer smokes.  She denies numbness weakness of extremity incontinence visual disturbance leg swelling melena hematuria hematemesis, hearing changes tinnitus seizures.    In the ED, seen by tele Neurology not a candidate for tPA CT head without acute intracranial abnormality afebrile without leukocytosis INR 1.2 LDL 66 TSH within normal limits COVID negative flu negative.      Past Medical History:   Diagnosis Date    Anxiety     Depression     Heart attack     12 years ago    History of heart attack     Hypertension        Past Surgical History:   Procedure Laterality Date     SECTION      EYE SURGERY Bilateral     CATARACT       Review of patient's allergies indicates:   Allergen Reactions    Talwin [pentazocine lactate] Shortness Of Breath    Ace inhibitors Other (See Comments)     cough       No  current facility-administered medications on file prior to encounter.     Current Outpatient Medications on File Prior to Encounter   Medication Sig    albuterol (VENTOLIN HFA) 90 mcg/actuation inhaler Inhale 2 puffs into the lungs every 4 (four) hours as needed for Wheezing or Shortness of Breath. Rescue    aspirin (ECOTRIN) 325 MG EC tablet Take 325 mg by mouth once daily.    atorvastatin (LIPITOR) 40 MG tablet Take 40 mg by mouth once daily.    EPINEPHrine (EPIPEN JR) 0.15 mg/0.3 mL pen injection Inject 0.15 mg into the muscle.    famotidine (PEPCID) 20 MG tablet Take 20 mg by mouth 2 (two) times daily.    fluticasone propionate (FLONASE) 50 mcg/actuation nasal spray 1 spray by Each Nostril route once daily.    hydrochlorothiazide (HYDRODIURIL) 25 MG tablet Take 25 mg by mouth once daily.    metoprolol tartrate (LOPRESSOR) 50 MG tablet Take 50 mg by mouth 2 (two) times daily.    nitroGLYCERIN (NITROSTAT) 0.4 MG SL tablet Place 0.4 mg under the tongue.    pantoprazole (PROTONIX) 40 MG tablet Take 40 mg by mouth Daily.    potassium chloride (KLOR-CON) 10 MEQ TbSR Take 10 mEq by mouth Daily.    venlafaxine (EFFEXOR-XR) 150 MG Cp24 Take 150 mg by mouth once daily. TAKE ONE CAPSULE BY MOUTH EVERY MORNING. TAKE with 150mg  total dose OF 225mg    venlafaxine (EFFEXOR-XR) 75 MG 24 hr capsule Take 75 mg by mouth Daily.     Family History       Problem Relation (Age of Onset)    Diabetes Mother, Father, Sister    Heart disease Mother, Father, Sister    Hypertension Mother          Tobacco Use    Smoking status: Every Day     Packs/day: 0.50     Types: Cigarettes    Smokeless tobacco: Never   Substance and Sexual Activity    Alcohol use: Yes     Comment: seldom    Drug use: No    Sexual activity: Not Currently     Review of Systems   Constitutional:  Negative for chills and fever.   HENT:  Negative for nosebleeds and tinnitus.    Eyes:  Negative for photophobia and visual disturbance.   Respiratory:  Negative for shortness  of breath and wheezing.    Cardiovascular:  Negative for chest pain, palpitations and leg swelling.   Gastrointestinal:  Negative for abdominal distention, nausea and vomiting.   Genitourinary:  Negative for dysuria, flank pain and hematuria.   Musculoskeletal:  Negative for gait problem and joint swelling.   Skin:  Negative for rash and wound.   Neurological:  Positive for facial asymmetry and speech difficulty. Negative for seizures and syncope.   Objective:     Vital Signs (Most Recent):  Temp: 97.7 °F (36.5 °C) (04/03/23 1733)  Pulse: (!) 57 (04/03/23 2002)  Resp: (!) 22 (04/03/23 2002)  BP: 130/61 (04/03/23 2002)  SpO2: (!) 94 % (04/03/23 2002)   Vital Signs (24h Range):  Temp:  [97.7 °F (36.5 °C)] 97.7 °F (36.5 °C)  Pulse:  [50-58] 57  Resp:  [18-25] 22  SpO2:  [93 %-95 %] 94 %  BP: (130-169)/(61-83) 130/61     Weight: 103 kg (227 lb)  Body mass index is 36.64 kg/m².    Physical Exam  Vitals and nursing note reviewed.   Constitutional:       General: She is not in acute distress.     Appearance: She is well-developed. She is not diaphoretic.   HENT:      Head: Normocephalic and atraumatic.      Right Ear: External ear normal.      Left Ear: External ear normal.   Eyes:      General:         Right eye: No discharge.         Left eye: No discharge.      Conjunctiva/sclera: Conjunctivae normal.   Neck:      Thyroid: No thyromegaly.   Cardiovascular:      Rate and Rhythm: Normal rate and regular rhythm.      Heart sounds: No murmur heard.  Pulmonary:      Effort: Pulmonary effort is normal. No respiratory distress.      Breath sounds: Normal breath sounds.   Abdominal:      General: Bowel sounds are normal. There is no distension.      Palpations: Abdomen is soft. There is no mass.      Tenderness: There is no abdominal tenderness.   Musculoskeletal:         General: No deformity.      Cervical back: Normal range of motion and neck supple.   Skin:     General: Skin is warm and dry.   Neurological:      Mental  Status: She is alert and oriented to person, place, and time.      Comments: Left sided facial droop with slurring of words. Symmetric eye brow raise. 5/5 strength BUE and BLE. No sensation deficits to arm or leg.    Psychiatric:         Mood and Affect: Mood normal.         Behavior: Behavior normal.           Significant Labs: CBC:   Recent Labs   Lab 04/03/23 1754 04/03/23 1754   WBC 11.48  --    HGB 15.3  --    HCT 46.9 49     --      CMP:   Recent Labs   Lab 04/03/23 1754      K 3.6      CO2 24   *   BUN 17   CREATININE 1.0   CALCIUM 9.3   PROT 7.9   ALBUMIN 3.8   BILITOT 0.9   ALKPHOS 95   AST 24   ALT 27   ANIONGAP 11     Coagulation:   Recent Labs   Lab 04/03/23 1754   INR 1.2     Lipid Panel:   Recent Labs   Lab 04/03/23 1754   CHOL 132   HDL 43   LDLCALC 66.2   TRIG 114   CHOLHDL 32.6     TSH:   Recent Labs   Lab 04/03/23 1754   TSH 2.871       Significant Imaging:   Imaging Results              CT Head Without Contrast (Final result)  Result time 04/03/23 17:50:29      Final result by Wandy Nichols MD (04/03/23 17:50:29)                   Impression:      No acute intracranial abnormality detected at this time..  Mild chronic small vessel ischemic changes.  If persistent neurological symptoms, recommend MRI of the brain with diffusion-weighted sequencing.      Electronically signed by: Wandy Nichols  Date:    04/03/2023  Time:    17:50               Narrative:    EXAMINATION:  CT OF THE HEAD WITHOUT    CLINICAL HISTORY:  Slurred speech and confusion.  Cough.    TECHNIQUE:  5 mm unenhanced axial images were obtained from the skull base to the vertex.    COMPARISON:  None.    FINDINGS:  The ventricles, basal cisterns, and cortical sulci are within normal limits for patient's stated age.  Mild periventricular lucencies are present suggestive of chronic small vessel ischemic changes.  There is no acute intracranial hemorrhage, territorial infarct or mass effect, or  midline shift. The visualized paranasal sinuses and mastoid air cells are clear. There is calcification along the anterior falx, which is a normal variant.                                        Assessment/Plan:     * Acute CVA (cerebrovascular accident)  Presents with left sided facial droop beginning at 130pm and remaining constant with symmetric eye brow raise. Seen by tele-neuro not a candidate for TPA. CT head without acute process.   MRI With evidence of acute infarct in right sub insular cortex and centrum semiovale.   echo/carotid US pending  Aspirin/statin/plavix  Neuro consulted  PT/OT/SLP  Neuro checks/aspiration precautions/telemetry/nursing swallow assessment  Permissive HTN    Wheezing  Presents with wheezing without symptoms per family baseline wheezing. Followed by pulmonary per care everywhere. She denies COPD though per care everywhere history of COPD  Will give duo-neb PRN    Atherosclerosis of coronary artery without angina pectoris  Denies chest pain.  Continue aspirin/statin/plavix as above    Essential hypertension  Home HCTZ and metoprolol held for permissive HTN      VTE Risk Mitigation (From admission, onward)           Ordered     Place TANESHA hose  Until discontinued         04/03/23 2008     IP VTE HIGH RISK PATIENT  Once         04/03/23 2008     Place sequential compression device  Until discontinued         04/03/23 2008                     Discussed with ED physician.    VTE: TANESHA/SCD  Code: Full  Diet: NPO pending CONI  Dispo: pending Mri and neuro eval    On 04/03/2023, patient should be placed in hospital observation services under my care in collaboration with Yannick Self MD.      Ignacio Morales PA-C  Department of Hospital Medicine  [facility] - Emergency Dept   Carac Counseling:  I discussed with the patient the risks of Carac including but not limited to erythema, scaling, itching, weeping, crusting, and pain.

## 2023-04-04 NOTE — PLAN OF CARE
ST RECS: regular with thin liquids, following for slight slur below baseline, and ongoing assessment of cognitive linguistic skills.

## 2023-04-04 NOTE — ASSESSMENT & PLAN NOTE
Presents with left sided facial droop beginning at 130pm and remaining constant with symmetric eye brow raise. Seen by tele-neuro not a candidate for TPA. CT head without acute process.   MRI With evidence of acute infarct in right sub insular cortex and centrum semiovale.   Continue slurred speech and left facial droop, left pronator drift .  MRI brain revealed acute infarction in the area of right subinsular cortex and right centrum semovale. The is also old lacunar infarcts left dennis  Tele NSR 60's , no a fib or a flutter.   Continue ASA, plavix, statin.   2 D Echo, US carotid  Permisive htn-start IVF  PT/OT/SLP consult  Neurology consult

## 2023-04-04 NOTE — SUBJECTIVE & OBJECTIVE
Interval History: wants to go home. Denies chest pain , sob, numbness or weakness.    Review of Systems   Constitutional:  Negative for chills and fever.   HENT:  Negative for nosebleeds and tinnitus.    Eyes:  Negative for photophobia and visual disturbance.   Respiratory:  Negative for shortness of breath and wheezing.    Cardiovascular:  Negative for chest pain, palpitations and leg swelling.   Gastrointestinal:  Negative for abdominal distention, nausea and vomiting.   Genitourinary:  Negative for dysuria, flank pain and hematuria.   Musculoskeletal:  Negative for gait problem and joint swelling.   Skin:  Negative for rash and wound.   Neurological:  Positive for facial asymmetry and speech difficulty. Negative for seizures and syncope.   Objective:     Vital Signs (Most Recent):  Temp: 97.6 °F (36.4 °C) (04/04/23 0733)  Pulse: (!) 59 (04/04/23 0733)  Resp: 19 (04/04/23 0733)  BP: (!) 145/67 (04/04/23 0733)  SpO2: (!) 93 % (04/04/23 0733)   Vital Signs (24h Range):  Temp:  [97.6 °F (36.4 °C)-97.8 °F (36.6 °C)] 97.6 °F (36.4 °C)  Pulse:  [50-72] 59  Resp:  [18-25] 19  SpO2:  [93 %-95 %] 93 %  BP: (130-170)/(61-86) 145/67     Weight: 101.2 kg (223 lb 1.7 oz)  Body mass index is 36.01 kg/m².  No intake or output data in the 24 hours ending 04/04/23 0823   Physical Exam  Vitals and nursing note reviewed.   Constitutional:       General: She is not in acute distress.     Appearance: She is well-developed. She is not diaphoretic.   Eyes:      General:         Right eye: No discharge.         Left eye: No discharge.      Conjunctiva/sclera: Conjunctivae normal.   Neck:      Thyroid: No thyromegaly.   Cardiovascular:      Rate and Rhythm: Normal rate and regular rhythm.      Heart sounds: No murmur heard.  Pulmonary:      Effort: Pulmonary effort is normal. No respiratory distress.      Breath sounds: Normal breath sounds.   Abdominal:      General: Bowel sounds are normal. There is no distension.      Palpations:  Abdomen is soft. There is no mass.      Tenderness: There is no abdominal tenderness.   Musculoskeletal:         General: No deformity.      Cervical back: Normal range of motion and neck supple.   Skin:     General: Skin is warm and dry.   Neurological:      Mental Status: She is alert and oriented to person, place, and time.      Comments: Left sided facial droop with slurring of words. Symmetric eye brow raise. 5/5 strength BUE and BLE. No sensation deficits to arm or leg.   Left Pronotor drift    Psychiatric:         Mood and Affect: Mood normal.         Behavior: Behavior normal.       Significant Labs: All pertinent labs within the past 24 hours have been reviewed.    Significant Imaging: I have reviewed all pertinent imaging results/findings within the past 24 hours.

## 2023-04-04 NOTE — CONSULTS
Food & Nutrition  Education    Diet Education: Cardiac Diet  Time Spent: 10 mins  Learners: Ivet Crabtree      Nutrition Education provided with handouts: Low Sodium diet - Diet and Health - Heart Healthy Diet      Comments: Pt educated on cardiac diet. Pt did not have questions regarding diet education.      All questions and concerns answered. Dietitian's contact information provided.       Please Re-consult as needed        Thanks!     Adrianna Brooks, Registration Eligible, Provisional LDN

## 2023-04-04 NOTE — PLAN OF CARE
Problem: Physical Therapy  Goal: Physical Therapy Goal  Description: Goals to be met by: 23     Patient will increase functional independence with mobility by performin. Supine to sit with [place]  2. Rolling to Left and Right with [place]  3. Sit to stand transfer with [place]  4. Bed to chair transfer with [place]   5. Gait x500 feet with [place] using No Assistive Device   6. Lower extremity exercise program 2 sets x15 reps per handout, with independence    Outcome: Ongoing, Progressing     Pt ambulated ~200 ft with CGA-SBA using no AD.  Pt with 1 LOB, able to self recover.

## 2023-04-04 NOTE — ASSESSMENT & PLAN NOTE
R MCA M2 branch occlusion.  Cont ASA/Plavix/Statin  Check bubble study  Consider ILR as outpatient

## 2023-04-04 NOTE — ASSESSMENT & PLAN NOTE
Presents with left sided facial droop beginning at 130pm and remaining constant with symmetric eye brow raise. Seen by tele-neuro not a candidate for TPA. CT head without acute process.   MRI/echo/carotid US pending  Aspirin/statin/plavix  Neuro consulted  PT/OT/SLP  Neuro checks/aspiration precautions/telemetry/nursing swallow assessment  Permissive HTN

## 2023-04-04 NOTE — HOSPITAL COURSE
Ms. Luque is a 73-year-old female who was admitted to the hospital with slurred speech and left facial droop.  Initial CT unrevealing however MRI of the brain revealed an acute infarction in the area of right subinsular cortex and right centrum semiovale.  Also noted old lacunar infarcts in the left dennis.  Symptoms still present with increased left pronator drift.  Consult to neurology was placed.  CTA was ordered and noted evolution of stroke with no evidence of hemorrhage or shifting.  Telemetry reviewed throughout stay and noted to be in normal sinus rhythm without any evidence of atrial fibrillation or atrial flutter.  After reviewing imaging, neurology felt this was cardioembolic in nature and Cardiology was consulted.  Echo with bubble study did not reveal any evidence of shunt.  Recommendations including outpatient Holter monitor versus implanted loop recorder.  She remained on aspirin and Plavix and statin throughout the hospitalization.  On day of discharge 04/06, most of patient's symptoms had resolved and she was feeling well.  I had a long discussion with patient and her daughter about medications, no driving and referral placed a PMR.  We also discussed following up with her cardiologist for continued evaluation of possible arrhythmias.  They both expressed understanding and prescriptions were sent for patient.  Patient discharged home in stable condition.

## 2023-04-04 NOTE — CONSULTS
[facility] - Telemetry  Cardiology  Consult Note    Patient Name: Ivet Crabtree  MRN: [id number]  Admission Date: 4/3/2023  Hospital Length of Stay: 0 days  Code Status: Full Code   Attending Provider: Alfredo Blas MD   Consulting Provider: Riley Agustin MD  Primary Care Physician: Zoie Garcia MD  Principal Problem:Acute CVA (cerebrovascular accident)    Patient information was obtained from patient and ER records.     Inpatient consult to Cardiology  Consult performed by: Riley Agustin MD  Consult ordered by: Maria De Jesus Thakkar NP  Reason for consult: elev trop        Subjective:     Chief Complaint:  Facial droop/slurred speech/CVA     HPI:   73 y.o. female with CAD, HTN, COPD, anxiety presents to the hospital with a chief complaint of facial droop.  She reports today at 1:30 p.m. she was informed her daughter she had a facial droop and slurred speech.  The symptoms remained constant without aggravating alleviating factors.  They have never occurred before.  She is compliant with home aspirin and statin.  She no longer smokes.  She denies numbness weakness of extremity incontinence visual disturbance leg swelling melena hematuria hematemesis, hearing changes tinnitus seizures.  In the ED, seen by tele Neurology not a candidate for tPA CT head without acute intracranial abnormality afebrile without leukocytosis INR 1.2 LDL 66 TSH within normal limits COVID negative flu negative.  Overview/Hospital Course:  Admit to hospital for slurred speech and left facial droop found to have an acute stroke.  MRI brain revealed acute infarction in the area of right subinsular cortex and right centrum semovale. The is also old lacunar infarcts left dennis. 4/4, symptoms are still present and noted mild left pronator drift. Tele NSR 60's , no a fib or a flutter. Continue ASA, plavix, statin. 2 D Echo. Permisive htn-start IVF PT/OT/SLP consult. Neurology consult.    Follows with Dr. Carney, last seen  2022.    Cardiology consulted for elev trop in setting of CVA.    The patient presents with symptoms of stroke.  She apparently had facial droop and slurred speech.  The symptoms have abated.  She denies angina, dyspnea, palpitations, or syncope.  CTA of the brain notes right M2 occlusion.  The patient denies any prior history of atrial fibrillation.  She has a recent Holter noting SVT.  She is not on anticoagulation.  Echocardiogram was normal.  Note is made a troponin of 2.      Past Medical History:   Diagnosis Date    Anxiety     Depression     Heart attack     12 years ago    History of heart attack     Hypertension        Past Surgical History:   Procedure Laterality Date     SECTION      EYE SURGERY Bilateral     CATARACT       Review of patient's allergies indicates:   Allergen Reactions    Talwin [pentazocine lactate] Shortness Of Breath    Ace inhibitors Other (See Comments)     cough       No current facility-administered medications on file prior to encounter.     Current Outpatient Medications on File Prior to Encounter   Medication Sig    albuterol (VENTOLIN HFA) 90 mcg/actuation inhaler Inhale 2 puffs into the lungs every 4 (four) hours as needed for Wheezing or Shortness of Breath. Rescue    aspirin (ECOTRIN) 325 MG EC tablet Take 325 mg by mouth once daily.    atorvastatin (LIPITOR) 40 MG tablet Take 40 mg by mouth once daily.    EPINEPHrine (EPIPEN JR) 0.15 mg/0.3 mL pen injection Inject 0.15 mg into the muscle.    famotidine (PEPCID) 20 MG tablet Take 20 mg by mouth 2 (two) times daily.    fluticasone propionate (FLONASE) 50 mcg/actuation nasal spray 1 spray by Each Nostril route once daily.    hydrochlorothiazide (HYDRODIURIL) 25 MG tablet Take 25 mg by mouth once daily.    metoprolol tartrate (LOPRESSOR) 50 MG tablet Take 50 mg by mouth 2 (two) times daily.    nitroGLYCERIN (NITROSTAT) 0.4 MG SL tablet Place 0.4 mg under the tongue.    pantoprazole (PROTONIX)  40 MG tablet Take 40 mg by mouth Daily.    potassium chloride (KLOR-CON) 10 MEQ TbSR Take 10 mEq by mouth Daily.    venlafaxine (EFFEXOR-XR) 150 MG Cp24 Take 150 mg by mouth once daily. TAKE ONE CAPSULE BY MOUTH EVERY MORNING. TAKE with 150mg  total dose OF 225mg    venlafaxine (EFFEXOR-XR) 75 MG 24 hr capsule Take 75 mg by mouth Daily.     Family History       Problem Relation (Age of Onset)    Diabetes Mother, Father, Sister    Heart disease Mother, Father, Sister    Hypertension Mother          Tobacco Use    Smoking status: Every Day     Packs/day: 0.50     Types: Cigarettes    Smokeless tobacco: Never   Substance and Sexual Activity    Alcohol use: Yes     Comment: seldom    Drug use: No    Sexual activity: Not Currently     Review of Systems   Constitutional: Negative for chills, diaphoresis, fever and malaise/fatigue.   HENT:  Negative for nosebleeds.    Eyes:  Negative for blurred vision and double vision.   Cardiovascular:  Negative for chest pain, claudication, cyanosis, dyspnea on exertion, leg swelling, orthopnea, palpitations, paroxysmal nocturnal dyspnea and syncope.   Respiratory:  Negative for cough, shortness of breath and wheezing.    Skin:  Negative for dry skin and poor wound healing.   Musculoskeletal:  Negative for back pain, joint swelling and myalgias.   Gastrointestinal:  Negative for abdominal pain, nausea and vomiting.   Genitourinary:  Negative for hematuria.   Neurological:  Negative for dizziness, headaches, numbness, seizures and weakness.   Psychiatric/Behavioral:  Negative for altered mental status and depression.    Objective:     Vital Signs (Most Recent):  Temp: 98.6 °F (37 °C) (04/04/23 1121)  Pulse: (!) 57 (04/04/23 1121)  Resp: 19 (04/04/23 1121)  BP: (!) 161/73 (04/04/23 1121)  SpO2: 97 % (04/04/23 1121)   Vital Signs (24h Range):  Temp:  [97.6 °F (36.4 °C)-98.6 °F (37 °C)] 98.6 °F (37 °C)  Pulse:  [50-72] 57  Resp:  [18-25] 19  SpO2:  [93 %-97 %] 97 %  BP:  (130-170)/(61-86) 161/73     Weight: 101.2 kg (223 lb 1.7 oz)  Body mass index is 36.01 kg/m².    SpO2: 97 %       No intake or output data in the 24 hours ending 04/04/23 1415    Lines/Drains/Airways       Peripheral Intravenous Line  Duration                  Peripheral IV - Single Lumen 04/03/23 1753 20 G Right Antecubital <1 day                    Physical Exam  Constitutional:       General: She is not in acute distress.     Appearance: She is well-developed. She is obese. She is not ill-appearing, toxic-appearing or diaphoretic.   HENT:      Head: Normocephalic and atraumatic.   Eyes:      General: No scleral icterus.     Extraocular Movements: Extraocular movements intact.      Conjunctiva/sclera: Conjunctivae normal.      Pupils: Pupils are equal, round, and reactive to light.   Neck:      Vascular: No carotid bruit or JVD.      Trachea: No tracheal deviation.   Cardiovascular:      Rate and Rhythm: Normal rate and regular rhythm.      Pulses:           Carotid pulses are 2+ on the right side and 2+ on the left side.     Heart sounds: S1 normal and S2 normal. No murmur heard.    No friction rub. No gallop.   Pulmonary:      Effort: Pulmonary effort is normal. No respiratory distress.      Breath sounds: Normal breath sounds. No stridor. No wheezing, rhonchi or rales.   Chest:      Chest wall: No tenderness.   Abdominal:      General: There is no distension.      Palpations: Abdomen is soft.   Musculoskeletal:         General: No swelling or tenderness. Normal range of motion.      Cervical back: Normal range of motion and neck supple. No rigidity.      Right lower leg: No edema.      Left lower leg: No edema.   Skin:     General: Skin is warm and dry.      Coloration: Skin is not jaundiced.   Neurological:      General: No focal deficit present.      Mental Status: She is alert and oriented to person, place, and time.      Cranial Nerves: No cranial nerve deficit.   Psychiatric:         Mood and Affect: Mood  normal.         Behavior: Behavior normal.       Current Medications:   albuterol-ipratropium  3 mL Nebulization Q4H    aspirin  81 mg Oral Daily    atorvastatin  40 mg Oral Daily    clopidogreL  75 mg Oral Daily    guaiFENesin  600 mg Oral BID    [START ON 4/5/2023] predniSONE  40 mg Oral Daily       acetaminophen, benzonatate, melatonin, senna-docusate 8.6-50 mg, sodium chloride 0.9%    Laboratory (all labs reviewed):  CBC:  Recent Labs   Lab 09/27/21 0725 09/28/21  0335 04/03/23 1754 04/03/23 1754 04/04/23  0449   WBC 12.63 10.77 11.48  --  10.43   Hemoglobin 16.4 H 15.4 15.3  --  14.7   POC Hematocrit  --   --   --  49  --    Hematocrit 47.4 46.7 46.9  --  44.1   Platelets 280 296 343  --  289       CHEMISTRIES:  Recent Labs   Lab 09/27/21 0725 09/28/21 0335 04/03/23 1754 04/04/23  0449   Glucose 188 H 115 H 145 H 95   Sodium 141 143 139 141   Potassium 3.0 L 3.4 L 3.6 3.3 L   BUN 20 14 17 15   Creatinine 0.8 0.8 1.0 0.9   eGFR if non  >60 >60  --   --    eGFR  --   --  59 A >60   Calcium 9.9 9.6 9.3 9.0   Magnesium 2.1 2.2  --  2.0       CARDIAC BIOMARKERS:  Recent Labs   Lab 09/28/21 0335 09/28/21  0737 09/28/21  1329 04/04/23  0449 04/04/23  1041   Troponin I 0.080 H 0.071 H 0.052 H 1.714 H 2.211 H       COAGS:  Recent Labs   Lab 04/03/23  1754 04/04/23  0449   INR 1.2 1.1       LIPIDS/LFTS:  Recent Labs   Lab 09/27/21 0725 09/27/21  1505 09/28/21  0335 04/03/23  1754 04/04/23  0449   Cholesterol  --  122  --  132  --    Triglycerides  --  88  --  114  --    HDL  --  46  --  43  --    LDL Cholesterol  --  58.4 L  --  66.2  --    Non-HDL Cholesterol  --  76  --  89  --    AST 19  --  22 24 32   ALT 30  --  29 27 29       BNP:  Recent Labs   Lab 04/04/23  1041    H       TSH:  Recent Labs   Lab 09/27/21  1505 04/03/23  1754   TSH 2.311 2.871       Free T4:        Diagnostic Results:  ECG (personally reviewed and interpreted tracing(s)):  4/3/23 1751 SR 59, low volt    CTA  head/neck 4/4/23  1. Continued evolution of recent infarction in the right insula and adjoining white matter, as seen to better advantage on prior MR imaging of 04/03/2023.  No definite macroscopic hemorrhage.  No definite intercurrent ischemia additional vascular territories by noncontrast CT.  2. Occlusion of (RIGHT) proximal superior division M2 branch within the sylvian fissure.  There is some degree of thready opacification of more distal MCA branches, remaining diminutive in caliber when compared to the contralateral side.  3. Elsewhere, no additional areas of acute large vessel occlusion or flow-limiting stenosis.  Details of cervical and intracranial atherosclerotic disease, as provided in the body of the report.  4. Nonspecific sub 5 mm solid pulmonary nodules.  For multiple solid nodules all <6 mm, Fleischner Society 2017 guidelines recommend no routine follow up for a low risk patient, or follow up with non-contrast chest CT at 12 months after discovery in a high risk patient.    Echo: 4/4/23 (images personally reviewed and interpreted) Bubble study ordered   The left ventricle is normal in size with mild concentric hypertrophy and normal systolic function.   The estimated ejection fraction is 70%.   Normal right ventricular size with normal right ventricular systolic function.   The estimated PA systolic pressure is 34 mmHg.   No intracardiac source of embolus noted on this exam. If high clinical suspicion, consider SAI +/- bubble study.    Holter 3/20/23 (Care everywhere)  Maximum heart rate  115 bpm   Minimum heart rate  45 bpm   Average heart rate  57 bpm   Sinus rhythm   PVCs - 162 with 2 VE couplets and one 9 beat run of VTACH @ 154 bpm   SVEs - 374 with 8 episodes of SVT - longest 10beats, fastest 128 bpm   Ischemia: Negative   Arrhythmias:  Positive for VTACH and SVT    Stress Test: L MPI 9/28/21    Normal myocardial perfusion scan. There is no evidence of myocardial ischemia or  infarction.    There is a  mild intensity fixed defect in the apical wall of the left ventricle secondary to diaphragm attenuation.    The gated perfusion images showed an ejection fraction of 83% post stress.    There is normal wall motion post stress.    The EKG portion of this study is negative for ischemia.    The patient reported chest pain during the stress test.    There were no arrhythmias during stress.    Cath 4/2001 (care everywhere Dr. Carney note 8/31/22)  ADMITTED WITH AN INFERIOR POSTERIOR MI, UNDERWENT ANGIOGRAPHY THAT SHOWED MILD CORONARY ARTERY DISEASE, MEDICAL THERAPY RECOMMENDED.     Assessment and Plan:     * Acute CVA (cerebrovascular accident)  R MCA M2 branch occlusion.  Cont ASA/Plavix/Statin  Check bubble study  Consider ILR as outpatient    Essential hypertension  Cont med rx    Elevated troponin, hx of CAD   Trop 2, ?r/t CVA  No anginal sxs.  EKG/echo normal.  Prior hx MI 2001 with nonobst CAD by cath at that time.  Neg MPI 9/2021.  Doubt ACS.  Consider outpat MPI after she recovers from CVA.    Dyslipidemia  Inc atorva 80mg qhs  Check lipids/LFT 6 months         VTE Risk Mitigation (From admission, onward)         Ordered     Place TANESHA hose  Until discontinued         04/03/23 2008     IP VTE HIGH RISK PATIENT  Once         04/03/23 2008     Place sequential compression device  Until discontinued         04/03/23 2008                Thank you for your consult. I will follow-up with patient. Please contact us if you have any additional questions.    Riley Agustin MD  Cardiology   [facility] [place] [facility]

## 2023-04-04 NOTE — PLAN OF CARE
Problem: Adult Inpatient Plan of Care  Goal: Plan of Care Review  Outcome: Ongoing, Progressing  Goal: Patient-Specific Goal (Individualized)  Outcome: Ongoing, Progressing  Goal: Absence of Hospital-Acquired Illness or Injury  Outcome: Ongoing, Progressing  Goal: Optimal Comfort and Wellbeing  Outcome: Ongoing, Progressing  Goal: Readiness for Transition of Care  Outcome: Ongoing, Progressing     Problem: Adjustment to Illness (Stroke, Ischemic/Transient Ischemic Attack)  Goal: Optimal Coping  Outcome: Ongoing, Progressing     Problem: Bowel Elimination Impaired (Stroke, Ischemic/Transient Ischemic Attack)  Goal: Effective Bowel Elimination  Outcome: Ongoing, Progressing     Problem: Cerebral Tissue Perfusion (Stroke, Ischemic/Transient Ischemic Attack)  Goal: Optimal Cerebral Tissue Perfusion  Outcome: Ongoing, Progressing     Problem: Cognitive Impairment (Stroke, Ischemic/Transient Ischemic Attack)  Goal: Optimal Cognitive Function  Outcome: Ongoing, Progressing     Problem: Communication Impairment (Stroke, Ischemic/Transient Ischemic Attack)  Goal: Improved Communication Skills  Outcome: Ongoing, Progressing     Problem: Functional Ability Impaired (Stroke, Ischemic/Transient Ischemic Attack)  Goal: Optimal Functional Ability  Outcome: Ongoing, Progressing     Problem: Respiratory Compromise (Stroke, Ischemic/Transient Ischemic Attack)  Goal: Effective Oxygenation and Ventilation  Outcome: Ongoing, Progressing     Problem: Sensorimotor Impairment (Stroke, Ischemic/Transient Ischemic Attack)  Goal: Improved Sensorimotor Function  Outcome: Ongoing, Progressing     Problem: Swallowing Impairment (Stroke, Ischemic/Transient Ischemic Attack)  Goal: Optimal Eating and Swallowing without Aspiration  Outcome: Ongoing, Progressing     Problem: Urinary Elimination Impaired (Stroke, Ischemic/Transient Ischemic Attack)  Goal: Effective Urinary Elimination  Outcome: Ongoing, Progressing     Problem: Fall Injury  Risk  Goal: Absence of Fall and Fall-Related Injury  Outcome: Ongoing, Progressing     Problem: Infection  Goal: Absence of Infection Signs and Symptoms  Outcome: Ongoing, Progressing

## 2023-04-04 NOTE — ASSESSMENT & PLAN NOTE
Presents with wheezing . History of heavy smoking quit 5 years ago. Followed by pulmonary per care everywhere. She denies COPD though per care everywhere history of COPD  Was told had bronchitis- afebrile and non productive. Hold off abx  Douneb, prednisone

## 2023-04-04 NOTE — PROGRESS NOTES
[facility] Medicine  Progress Note    Patient Name: Ivet Crabtree  MRN: [id number]  Patient Class: OP- Observation   Admission Date: 4/3/2023  Length of Stay: 0 days  Attending Physician: Alfredo Blas MD  Primary Care Provider: Zoie Garcia MD        Subjective:     Principal Problem:Acute CVA (cerebrovascular accident)        HPI:  Ivet Crabtree 73 y.o. female with CAD, HTN, COPD, anxiety presents to the hospital with a chief complaint of facial droop.  She reports today at 1:30 p.m. she was informed her daughter she had a facial droop and slurred speech.  The symptoms remained constant without aggravating alleviating factors.  They have never occurred before.  She is compliant with home aspirin and statin.  She no longer smokes.  She denies numbness weakness of extremity incontinence visual disturbance leg swelling melena hematuria hematemesis, hearing changes tinnitus seizures.    In the ED, seen by tele Neurology not a candidate for tPA CT head without acute intracranial abnormality afebrile without leukocytosis INR 1.2 LDL 66 TSH within normal limits COVID negative flu negative.      Overview/Hospital Course:  Admit to hospital for slurred speech and left facial droop found to have an acute stroke.  MRI brain revealed acute infarction in the area of right subinsular cortex and right centrum semovale. The is also old lacunar infarcts left dennis. 4/4, symptoms are still present and noted mild left pronator drift. Tele NSR 60's , no a fib or a flutter. Continue ASA, plavix, statin. 2 D Echo. Permisive htn-start IVF PT/OT/SLP consult. Neurology consult.      Interval History: wants to go home. Denies chest pain , sob, numbness or weakness.    Review of Systems   Constitutional:  Negative for chills and fever.   HENT:  Negative for nosebleeds and tinnitus.    Eyes:  Negative for photophobia and visual disturbance.   Respiratory:  Negative for shortness of breath and wheezing.     Cardiovascular:  Negative for chest pain, palpitations and leg swelling.   Gastrointestinal:  Negative for abdominal distention, nausea and vomiting.   Genitourinary:  Negative for dysuria, flank pain and hematuria.   Musculoskeletal:  Negative for gait problem and joint swelling.   Skin:  Negative for rash and wound.   Neurological:  Positive for facial asymmetry and speech difficulty. Negative for seizures and syncope.   Objective:     Vital Signs (Most Recent):  Temp: 97.6 °F (36.4 °C) (04/04/23 0733)  Pulse: (!) 59 (04/04/23 0733)  Resp: 19 (04/04/23 0733)  BP: (!) 145/67 (04/04/23 0733)  SpO2: (!) 93 % (04/04/23 0733)   Vital Signs (24h Range):  Temp:  [97.6 °F (36.4 °C)-97.8 °F (36.6 °C)] 97.6 °F (36.4 °C)  Pulse:  [50-72] 59  Resp:  [18-25] 19  SpO2:  [93 %-95 %] 93 %  BP: (130-170)/(61-86) 145/67     Weight: 101.2 kg (223 lb 1.7 oz)  Body mass index is 36.01 kg/m².  No intake or output data in the 24 hours ending 04/04/23 0823   Physical Exam  Vitals and nursing note reviewed.   Constitutional:       General: She is not in acute distress.     Appearance: She is well-developed. She is not diaphoretic.   Eyes:      General:         Right eye: No discharge.         Left eye: No discharge.      Conjunctiva/sclera: Conjunctivae normal.   Neck:      Thyroid: No thyromegaly.   Cardiovascular:      Rate and Rhythm: Normal rate and regular rhythm.      Heart sounds: No murmur heard.  Pulmonary:      Breath sounds: expiratory wheezing anterior posterior   Abdominal:      General: Bowel sounds are normal. There is no distension.      Palpations: Abdomen is soft. There is no mass.      Tenderness: There is no abdominal tenderness.   Musculoskeletal:         General: No deformity.      Cervical back: Normal range of motion and neck supple.   Skin:     General: Skin is warm and dry.   Neurological:      Mental Status: She is alert and oriented to person, place, and time.      Comments: Left sided facial droop with  slurring of words. Symmetric eye brow raise. 5/5 strength BUE and BLE. No sensation deficits to arm or leg.   Left Pronotor drift    Psychiatric:         Mood and Affect: Mood normal.         Behavior: Behavior normal.       Significant Labs: All pertinent labs within the past 24 hours have been reviewed.    Significant Imaging: I have reviewed all pertinent imaging results/findings within the past 24 hours.      Assessment/Plan:      * Acute CVA (cerebrovascular accident)  Presents with left sided facial droop beginning at 130pm and remaining constant with symmetric eye brow raise. Seen by tele-neuro not a candidate for TPA. CT head without acute process.   MRI With evidence of acute infarct in right sub insular cortex and centrum semiovale.   Continue slurred speech and left facial droop, left pronator drift .  MRI brain revealed acute infarction in the area of right subinsular cortex and right centrum semovale. The is also old lacunar infarcts left dennis  Tele NSR 60's , no a fib or a flutter.   Continue ASA, plavix, statin.   2 D Echo, US carotid  Permisive htn-start IVF  PT/OT/SLP consult  Neurology consult          Elevated troponin, hx of CAD   Per [facility], history of CAD sp inferoposterior MI 2001 /cath showed non obstructive CAD   EKG SB no evidence of ischemia  9/2021 NST no evidence of ischemia  Denies cp or sob  Trend troponin- demand from acute stroke, COPD  If trend elevation, consult Cardiology  2 D Echo   Continue ASA/plavix/statin      Atherosclerosis of coronary artery without angina pectoris  Denies chest pain.  Continue aspirin/statin/plavix as above    COPD (chronic obstructive pulmonary disease)  Presents with wheezing . History of heavy smoking quit 5 years ago. Followed by pulmonary per care everywhere. She denies COPD though per care everywhere history of COPD  Was told had bronchitis- afebrile and non productive. Hold off abx  Douneb, prednisone     Essential hypertension  Home HCTZ and  metoprolol held for permissive HTN    VTE Risk Mitigation (From admission, onward)           Ordered     Place TANESHA hose  Until discontinued         04/03/23 2008     IP VTE HIGH RISK PATIENT  Once         04/03/23 2008     Place sequential compression device  Until discontinued         04/03/23 2008                    Discharge Planning   KATHY:      Code Status: Full Code   Is the patient medically ready for discharge?:     Reason for patient still in hospital (select all that apply): Treatment  Discharge Plan A: Home            Maria De Jesus Thakkar NP  Department of [facility] Medicine   [facility]

## 2023-04-04 NOTE — HPI
73 y.o. female with CAD, HTN, COPD, anxiety presents to the hospital with a chief complaint of facial droop.  She reports today at 1:30 p.m. she was informed her daughter she had a facial droop and slurred speech.  The symptoms remained constant without aggravating alleviating factors.  They have never occurred before.  She is compliant with home aspirin and statin.  She no longer smokes.  She denies numbness weakness of extremity incontinence visual disturbance leg swelling melena hematuria hematemesis, hearing changes tinnitus seizures.  In the ED, seen by tele Neurology not a candidate for tPA CT head without acute intracranial abnormality afebrile without leukocytosis INR 1.2 LDL 66 TSH within normal limits COVID negative flu negative.  Overview/Hospital Course:  Admit to hospital for slurred speech and left facial droop found to have an acute stroke.  MRI brain revealed acute infarction in the area of right subinsular cortex and right centrum semovale. The is also old lacunar infarcts left dennis. 4/4, symptoms are still present and noted mild left pronator drift. Tele NSR 60's , no a fib or a flutter. Continue ASA, plavix, statin. 2 D Echo. Permisive htn-start IVF PT/OT/SLP consult. Neurology consult.    Follows with Dr. Carney, last seen 8/2022.    Cardiology consulted for elev trop in setting of CVA.    The patient presents with symptoms of stroke.  She apparently had facial droop and slurred speech.  The symptoms have abated.  She denies angina, dyspnea, palpitations, or syncope.  CTA of the brain notes right M2 occlusion.  The patient denies any prior history of atrial fibrillation.  She has a recent Holter noting SVT.  She is not on anticoagulation.  Echocardiogram was normal.  Note is made a troponin of 2.

## 2023-04-04 NOTE — PT/OT/SLP EVAL
"Occupational Therapy Evaluation     Name: Ievt Crabtree  MRN: [id number]  Admitting Diagnosis: Acute CVA (cerebrovascular accident)  Recent Surgery: * No surgery found *      Recommendations:     Discharge Recommendations: outpatient OT  Level of Assistance Recommended: Intermittent supervision  Discharge Equipment Recommendations: none  Barriers to discharge: None    Assessment:     Ivet Crabtree is a 73 y.o. female with a medical diagnosis of Acute CVA (cerebrovascular accident). She presents with performance deficits affecting function including weakness, impaired endurance, impaired self care skills, impaired functional mobility, impaired balance.   The patient participated in OT eval with no c/o UE weakness as noted previously. The patient is noted to have a left facial droop but UE function is WFL. The patient will benefit from OT to address balance and home safety.    Rehab Prognosis: Good; patient would benefit from acute OT services to address these deficits and reach maximum level of function.      Plan:     Patient to be seen 3 x/week to address the above listed problems via self-care/home management, therapeutic activities, therapeutic exercises  Plan of Care Expires: 04/18/23  Plan of Care Reviewed with: patient, daughter    Subjective     Chief Complaint: "I don't want to raise my arms for another person"  Patient Comments/Goals: anxious for D/C  Pain/Comfort:  Pain Rating 1: 0/10    Patients cultural, spiritual, Yazidism conflicts given the current situation: no    Social History:  Living Environment: Patient lives alone in a single story home with walk-in shower and built-in shower chair  Prior Level of Function: Prior to admission, patient was independent.  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  Equipment Used at Home: none  DME owned (not currently used):  handicapped bathroom with grab bars and HH " shower  Assistance Upon Discharge:  2 daughters    Objective:      Patient found HOB elevated with peripheral IV, telemetry upon OT entry to room.    General Precautions: Standard, fall   Orthopedic Precautions: N/A   Braces: N/A    Respiratory Status: Room air    Cognitive and Psychosocial Function:  Oriented to: Person, Place, Time, Situation  Follows Commands/Attention: attentive and follows multi-step commands  Communication: clear/fluent  Memory: No Deficits noted  Safety Awareness/Insight to Disability: intact  Mood/Affect/Coping Skills/Emotional Control: Appropriate to situation    Hearing: Intact    Vision: No deficits noted      Physical Exam:  Postural examination/scapula alignment:    -       No postural abnormalities identified  Skin integrity: Visible skin intact  Hand dominance: Right        Left UE Right UE   UE Edema None noted None noted   UE ROM AROM WFL AROM WFL   UE Strength 5/5 5/5    Strength grasp WNL grasp WNL   Sensation LUE  INTACT:  light/touch RUE  INTACT:  light/touch   Fine Motor Coordination:  LUE  INTACT:    RUE  INTACT:        Gross Motor Coordination: LUE  INTACT:   RUE  INTACT:        Occupational Performance    Gait belt applied - Yes    Bed Mobility:   Supine to sit from right side of bed with modified independence  Supine to sit from left side of bed with modified independence    Functional Mobility/Transfers  Static Sitting EOB: modified independence  Dynamic Sitting EOB: stand by assistance  Static Standing Balance: stand by assistance  Dynamic Standing Balance: stand by assistance and contact guard assistance  Sit <> Stand Transfer with stand by assistance with no AD  Functionality Mobility: The patient amb without AD in the hallway with PT with SBA/CGA with 1 LOB     Activities of Daily Living:  Upper Body Dressing: modified independence  Lower Body Dressing: modified independence and to don socks in bed    AMPAC 6 Click ADL:  AMPAC Total Score: 24    Treatment &  Education:  Patient educated on role of OT, POC, and goals for therapy.  Discussed OT recommendations with patient and daughter      Patient left sitting edge of bed with all lines intact and call button in reach.    GOALS:   Multidisciplinary Problems       Occupational Therapy Goals          Problem: Occupational Therapy    Goal Priority Disciplines Outcome Interventions   Occupational Therapy Goal     OT, PT/OT Ongoing, Progressing    Description: Goals to be met by: 23     Patient will increase functional independence with ADLs by performing:    Grooming while standing with [place].  Toileting from toilet with [place] for hygiene and clothing management.   Toilet transfer to toilet with [place].  Upper extremity exercise program x20 reps per handout, with independence.  Educate the patient re: Homes safety                       History:     Past Medical History:   Diagnosis Date    Anxiety     Depression     Heart attack     12 years ago    History of heart attack     Hypertension          Past Surgical History:   Procedure Laterality Date     SECTION      EYE SURGERY Bilateral     CATARACT       Time Tracking:     OT Date of Treatment: 23  OT Start Time: 1025  OT Stop Time: 1038  OT Total Time (min): 13 min    Billable Minutes: Evaluation 13 (with PT)    2023

## 2023-04-04 NOTE — ASSESSMENT & PLAN NOTE
Presents with wheezing without symptoms per family baseline wheezing. Followed by pulmonary per care everywhere. She denies COPD though per care everywhere history of COPD  Will give duo-neb PRN

## 2023-04-04 NOTE — CONSULTS
[facility] Tele-Consultation from Neurology    Consultation started: 2023 at 9:23 AM   The chief complaint leading to consultation is: Stroke  This consultation was requested by:    The patient location is: [facility]  Spoke nurse at bedside with patient assisting consultant. Also present with the patient at the time of the consultation:family member and nurse    Inpatient consult to Neurology  Consult performed by: Bret Weathers MD  Consult ordered by: Ignacio Morales PA-C      Inpatient consult to Telemedicine-General Neurology  Consult performed by: Bret Weathers MD  Consult ordered by: Ignacio Morales PA-C         Subjective:     History of Present Illness:  Ivet Crabtree is a 73 y.o. right handed female with a history of HTN, MI/CAD (>20 yrs), former tobacco use,  who presented with left facial droop, slurred speech and left UE weakness.   Symptoms were noted by her coworkers. She was also apparently confused and not responding to questions. Had a fall when she got home later and found by her daughter who brought her to the ER.  She was evaluated on Telestroke with NIHSS 1. No tPA as she was out of the window. Was loaded with Plavix 300mg and started on DAPT and high intensity statin.    MRI brain showed acute infarcts in the right subinsular cortex, right corona radiata and MCA/SOLOMON borderzone regions (bilaterally, R>L, predominantly along the posterior aspect)    No prior history of stroke.  Former smoker. Quit 5 yrs ago.      Patient information was obtained from patient, past medical records, ER records, primary team, and daughter .    Past Medical History:   Diagnosis Date    Anxiety     Depression     Heart attack     12 years ago    History of heart attack     Hypertension        Past Surgical History:   Procedure Laterality Date     SECTION      EYE SURGERY Bilateral     CATARACT       Family History   Problem Relation Age of Onset    Diabetes  "Mother     Heart disease Mother     Hypertension Mother     Diabetes Father     Heart disease Father     Diabetes Sister     Heart disease Sister         Social History     Tobacco Use    Smoking status: Every Day     Packs/day: 0.50     Types: Cigarettes    Smokeless tobacco: Never   Substance Use Topics    Alcohol use: Yes     Comment: seldom        Medications:   Current Facility-Administered Medications:     0.9%  NaCl infusion, , Intravenous, Continuous, Maria De Jesus Thakkar NP    acetaminophen tablet 500 mg, 500 mg, Oral, Q6H PRN, Ignacio Showers, PA-C, 500 mg at 04/03/23 2215    albuterol-ipratropium 2.5 mg-0.5 mg/3 mL nebulizer solution 3 mL, 3 mL, Nebulization, Q4H, Maria De Jesus Thakkar NP    aspirin EC tablet 81 mg, 81 mg, Oral, Daily, Ignacio Showers, PA-C, 81 mg at 04/04/23 0906    atorvastatin tablet 40 mg, 40 mg, Oral, Daily, Ignacio Showers, PA-C, 40 mg at 04/04/23 0906    clopidogreL tablet 75 mg, 75 mg, Oral, Daily, Ignacio Showers, PA-C, 75 mg at 04/04/23 0906    melatonin tablet 6 mg, 6 mg, Oral, Nightly PRN, Ignacio Showers, PA-C    methylPREDNISolone sodium succinate injection 80 mg, 80 mg, Intravenous, Once, Maria De Jesus Thakkar NP    [START ON 4/5/2023] predniSONE tablet 40 mg, 40 mg, Oral, Daily, Maria De Jesus Thakkar NP    senna-docusate 8.6-50 mg per tablet 1 tablet, 1 tablet, Oral, Daily PRN, Ignacio Showcarlitos, PA-C    sodium chloride 0.9% flush 10 mL, 10 mL, Intravenous, PRN, Ignacio Showers, PA-C    Allergies:   Review of patient's allergies indicates:   Allergen Reactions    Talwin [pentazocine lactate] Shortness Of Breath    Ace inhibitors Other (See Comments)     cough       Review Of Systems: ROS      Objective:     Vitals: Blood pressure (!) 145/67, pulse (!) 59, temperature 97.6 °F (36.4 °C), resp. rate 19, height 5' 6" (1.676 m), weight 101.2 kg (223 lb 1.7 oz), SpO2 (!) 93 %, not currently breastfeeding. Reviewed for date of service     Constitutional: Well-developed, well-nourished, appears " stated age    Neurological:    Mental status: Alert and oriented to person, place, time, and situation; no dysarthria; no aphasia; follows commands  Cranial nerves:   CN III, IV, VI: Extraocular movements full, no nystagmus visualized  CN V: Symmetric sensation to touch   CN VII: Left nasolabial flattening  CN VIII: Hearing grossly intact   CN IX, X: Palate raises midline and symmetric   CN XI: Strong shoulder shrug B   CN XII: Tongue appears midline   Motor: Subtle left pronator drift. No evidence of gross motor deficit.  Sensory: Intact to touch in all four extremities   Coordination: Finger nose intact  Gait: Not OOB  Deep tendon reflexes: Deferred      Labs:Reviewed for date of service  Radiology (i.e. PET Scan, X-ray, CT, MRI): Reviewed for date of service                    Assessment - Diagnosis - Goals:     Impression: Ivet Crabtree 73 y.o. female with a history of HTN, MI/CAD (>20 yrs ago), former tobacco use who presented with acute onset slurred speech, left facial droop and LUE weakness. Was also noted to be confused by her co-worker.    MRI brain showed acute infarcts predominantly involving the right insula/subinsular cortex, right corona radiata and posterior aspect of the MCA/SOLOMON borderzone territories, (R>L).  Pattern of infarcts is suggestive of a cardioembolic etiology.        Recommendations:   -- Continue DAPT x30 days followed by monotherapy with ASA 81mg daily.  -- Lipitor 40mg daily; LDL 66  -- CTA head and neck  -- TTE. If no evidence of Afib/flutter, she will need prolonged cardiac monitoring with 30 day event monitor and possibly ILR.  -- SBP goal<220 for another 24 hrs. After than, can start lowering goal gradually to <140/90. Please avoid aggressive lowering of BP.  -- PT/OT  -- Follow up with Vascular Neurology in 4-6 weeks. Prefers to follow up closer to home.    -- Unclear etiology for her confusion. Possibly a seizure? If she another such event, consider EEG.     Consultation ended:   10:33 AM     Total time spent with patient: < 30 Minutes    This is a consult performed through audio-visual using [employer] mayuri. Patient and provider are in different locations. History and physical exam are limited due to the nature of this encounter.       The attending portion of this evaluation, treatment, and documentation was performed per Bret Weathers MD via audiovisual.    Bret Weathers MD  Vascular Neurology  [facility].

## 2023-04-04 NOTE — NURSING
Pt AAOx4, respirations even and unlabored, no acute distress, no complaints of pain. Safety precautions in place, call light in reach, will continue to monitor.

## 2023-04-04 NOTE — PLAN OF CARE
[facility] - Emergency Dept  Discharge Assessment    SW completed brief assessment and discussed discharge planning with patient and her daughter Cindy at her bedside. Patient stated that she live alone but her daughter checks on her daily. Patient's daughter will provide transportation for her to get home when discharge from the hospital.    Primary Care Provider: Zoie Garcia MD     Discharge Assessment (most recent)       BRIEF DISCHARGE ASSESSMENT - 04/03/23 2052          Discharge Planning    Assessment Type Discharge Planning Brief Assessment     Resource/Environmental Concerns none     Support Systems Children     Equipment Currently Used at Home none     Current Living Arrangements home     Patient/Family Anticipates Transition to home     Patient/Family Anticipated Services at Transition none     DME Needed Upon Discharge  none     Discharge Plan A Home     Discharge Plan B Home

## 2023-04-04 NOTE — PT/OT/SLP EVAL
Speech Language Pathology Evaluation  Bedside Swallow    Patient Name:  Ivet Crabtree   MRN:  [id number]  Admitting Diagnosis: Acute CVA (cerebrovascular accident)    Recommendations:                 General Recommendations:  Cognitive-linguistic evaluation  Diet recommendations:  Regular, Thin   Aspiration Precautions: 1 bite/sip at a time   General Precautions: Standard,    Communication strategies:  none    History:     Past Medical History:   Diagnosis Date    Anxiety     Depression     Heart attack     12 years ago    History of heart attack     Hypertension        Past Surgical History:   Procedure Laterality Date     SECTION      EYE SURGERY Bilateral     CATARACT     Social History: IND for ADLs    Prior diet: unrestricted    Subjective   Pt Ox4, good humored. Awaiting tele-neuro   Patient goals: intiate diet    Pain/Comfort:  Pain Rating 1: 0/10    Respiratory Status: Room air;wheezing on expiration at baseline.     Objective:     Oral Musculature Evaluation  Oral Musculature: left weakness  Dentition: present and adequate  Secretion Management: adequate  Mucosal Quality: good  Mandibular Strength and Mobility: WFL  Oral Labial Strength and Mobility: WFL  Lingual Strength and Mobility: WFL (some deviation)  Velar Elevation: WFL  Buccal Strength and Mobility: WFL  Voice Prior to PO Intake: slighly hoarse with baseline expiratory weezing.  Oral Musculature Comments: slight facial droop however functional for bolus prep.    Bedside Swallow Eval:   Consistencies Assessed:  Thin liquids ~4oz multiple trials thin liquids via straw  Solids X1 cracker      Oral Phase:   WFL  Mil lingual residue post swallow    Pharyngeal Phase:   no overt clinical signs/symptoms of aspiration    Compensatory Strategies  None    Treatment: please note silent aspiration cannot be r/o at bedside.     Assessment:     Ivet Crabtree is a 73 y.o. female with dx of CVA she presents with functional swallow, mild  dysarthria, following for cognitive linguistic evaluation.     Goals:   Multidisciplinary Problems       SLP Goals          Problem: SLP    Goal Priority Disciplines Outcome   SLP Goal    Low SLP Ongoing, Progressing   Description: 1. Pt will tolerate regular with thin liquids without overt s/s of aspiration  2. Pt will participate in ongoing assessment of cognitive linguistic deficits  3. Pt will independently perform speech intelligibility compensation strategies                       Plan:     Patient to be seen:  3 x/week   Plan of Care expires:  04/13/23  Plan of Care reviewed with:      SLP Follow-Up:  Yes       Discharge recommendations:  outpatient speech therapy   Barriers to Discharge:  None    Time Tracking:     SLP Treatment Date:   04/04/23  Speech Start Time:  0949  Speech Stop Time:  0959     Speech Total Time (min):  10 min    Billable Minutes: Eval Swallow and Oral Function 10    04/04/2023

## 2023-04-04 NOTE — ASSESSMENT & PLAN NOTE
Per [facility], history of CAD sp inferoposterior MI 2001 /cath showed non obstructive CAD   EKG SB no evidence of ischemia  9/2021 NST no evidence of ischemia  Denies cp or sob  Trend troponin- demand from acute stroke, COPD  If trend elevation, consult Cardiology  2 D Echo   Continue ASA/plavix/statin

## 2023-04-04 NOTE — PLAN OF CARE
Problem: Occupational Therapy  Goal: Occupational Therapy Goal  Description: Goals to be met by: 4/18/23     Patient will increase functional independence with ADLs by performing:    Grooming while standing with [place].  Toileting from toilet with [place] for hygiene and clothing management.   Toilet transfer to toilet with [place].  Upper extremity exercise program x20 reps per handout, with independence.  Educate the patient re: Homes safety  Outcome: Ongoing, Progressing   The patient will benefit from Out Patient OT. No DME needs.

## 2023-04-05 VITALS
RESPIRATION RATE: 19 BRPM | OXYGEN SATURATION: 98 % | DIASTOLIC BLOOD PRESSURE: 70 MMHG | WEIGHT: 223.13 LBS | HEART RATE: 104 BPM | BODY MASS INDEX: 35.86 KG/M2 | HEIGHT: 66 IN | SYSTOLIC BLOOD PRESSURE: 150 MMHG | TEMPERATURE: 98 F

## 2023-04-05 LAB
ALBUMIN SERPL BCP-MCNC: 3.4 G/DL (ref 3.5–5.2)
ALP SERPL-CCNC: 92 U/L (ref 55–135)
ALT SERPL W/O P-5'-P-CCNC: 23 U/L (ref 10–44)
ANION GAP SERPL CALC-SCNC: 11 MMOL/L (ref 8–16)
AST SERPL-CCNC: 23 U/L (ref 10–40)
BASOPHILS # BLD AUTO: 0.04 K/UL (ref 0–0.2)
BASOPHILS NFR BLD: 0.3 % (ref 0–1.9)
BILIRUB SERPL-MCNC: 0.5 MG/DL (ref 0.1–1)
BUN SERPL-MCNC: 20 MG/DL (ref 8–23)
CALCIUM SERPL-MCNC: 9.1 MG/DL (ref 8.7–10.5)
CHLORIDE SERPL-SCNC: 108 MMOL/L (ref 95–110)
CO2 SERPL-SCNC: 21 MMOL/L (ref 23–29)
CREAT SERPL-MCNC: 1.1 MG/DL (ref 0.5–1.4)
DIFFERENTIAL METHOD: ABNORMAL
EOSINOPHIL # BLD AUTO: 0 K/UL (ref 0–0.5)
EOSINOPHIL NFR BLD: 0.1 % (ref 0–8)
ERYTHROCYTE [DISTWIDTH] IN BLOOD BY AUTOMATED COUNT: 13.9 % (ref 11.5–14.5)
EST. GFR  (NO RACE VARIABLE): 53 ML/MIN/1.73 M^2
GLUCOSE SERPL-MCNC: 156 MG/DL (ref 70–110)
HCT VFR BLD AUTO: 44 % (ref 37–48.5)
HGB BLD-MCNC: 14.1 G/DL (ref 12–16)
IMM GRANULOCYTES # BLD AUTO: 0.07 K/UL (ref 0–0.04)
IMM GRANULOCYTES NFR BLD AUTO: 0.6 % (ref 0–0.5)
LYMPHOCYTES # BLD AUTO: 1.7 K/UL (ref 1–4.8)
LYMPHOCYTES NFR BLD: 13.1 % (ref 18–48)
MCH RBC QN AUTO: 29.2 PG (ref 27–31)
MCHC RBC AUTO-ENTMCNC: 32 G/DL (ref 32–36)
MCV RBC AUTO: 91 FL (ref 82–98)
MONOCYTES # BLD AUTO: 1.3 K/UL (ref 0.3–1)
MONOCYTES NFR BLD: 10.4 % (ref 4–15)
NEUTROPHILS # BLD AUTO: 9.5 K/UL (ref 1.8–7.7)
NEUTROPHILS NFR BLD: 75.5 % (ref 38–73)
NRBC BLD-RTO: 0 /100 WBC
PLATELET # BLD AUTO: 309 K/UL (ref 150–450)
PMV BLD AUTO: 10.8 FL (ref 9.2–12.9)
POTASSIUM SERPL-SCNC: 3.5 MMOL/L (ref 3.5–5.1)
PROT SERPL-MCNC: 7 G/DL (ref 6–8.4)
RBC # BLD AUTO: 4.83 M/UL (ref 4–5.4)
SODIUM SERPL-SCNC: 140 MMOL/L (ref 136–145)
WBC # BLD AUTO: 12.62 K/UL (ref 3.9–12.7)

## 2023-04-05 PROCEDURE — 94760 N-INVAS EAR/PLS OXIMETRY 1: CPT

## 2023-04-05 PROCEDURE — 85025 COMPLETE CBC W/AUTO DIFF WBC: CPT | Performed by: PHYSICIAN ASSISTANT

## 2023-04-05 PROCEDURE — 25000242 PHARM REV CODE 250 ALT 637 W/ HCPCS: Performed by: NURSE PRACTITIONER

## 2023-04-05 PROCEDURE — 94640 AIRWAY INHALATION TREATMENT: CPT

## 2023-04-05 PROCEDURE — 99233 PR SUBSEQUENT HOSPITAL CARE,LEVL III: ICD-10-PCS | Mod: ,,, | Performed by: INTERNAL MEDICINE

## 2023-04-05 PROCEDURE — 63600175 PHARM REV CODE 636 W HCPCS: Performed by: NURSE PRACTITIONER

## 2023-04-05 PROCEDURE — 99233 SBSQ HOSP IP/OBS HIGH 50: CPT | Mod: ,,, | Performed by: INTERNAL MEDICINE

## 2023-04-05 PROCEDURE — 36415 COLL VENOUS BLD VENIPUNCTURE: CPT | Performed by: PHYSICIAN ASSISTANT

## 2023-04-05 PROCEDURE — 97110 THERAPEUTIC EXERCISES: CPT

## 2023-04-05 PROCEDURE — 25000003 PHARM REV CODE 250: Performed by: INTERNAL MEDICINE

## 2023-04-05 PROCEDURE — 80053 COMPREHEN METABOLIC PANEL: CPT | Performed by: PHYSICIAN ASSISTANT

## 2023-04-05 PROCEDURE — 25000003 PHARM REV CODE 250: Performed by: PHYSICIAN ASSISTANT

## 2023-04-05 PROCEDURE — 25000003 PHARM REV CODE 250: Performed by: NURSE PRACTITIONER

## 2023-04-05 PROCEDURE — 97116 GAIT TRAINING THERAPY: CPT | Mod: CQ

## 2023-04-05 PROCEDURE — 97535 SELF CARE MNGMENT TRAINING: CPT

## 2023-04-05 RX ORDER — ATORVASTATIN CALCIUM 40 MG/1
80 TABLET, FILM COATED ORAL DAILY
Start: 2023-04-05

## 2023-04-05 RX ORDER — PREDNISONE 20 MG/1
40 TABLET ORAL DAILY
Qty: 4 TABLET | Refills: 0 | Status: SHIPPED | OUTPATIENT
Start: 2023-04-05

## 2023-04-05 RX ORDER — HYDROCHLOROTHIAZIDE 25 MG/1
25 TABLET ORAL DAILY
Status: ON HOLD
Start: 2023-04-05 | End: 2023-04-10 | Stop reason: HOSPADM

## 2023-04-05 RX ORDER — CLOPIDOGREL BISULFATE 75 MG/1
75 TABLET ORAL DAILY
Qty: 30 TABLET | Refills: 0 | Status: ON HOLD | OUTPATIENT
Start: 2023-04-05 | End: 2023-04-10 | Stop reason: HOSPADM

## 2023-04-05 RX ORDER — METOPROLOL TARTRATE 50 MG/1
50 TABLET ORAL 2 TIMES DAILY
Start: 2023-04-05

## 2023-04-05 RX ADMIN — CLOPIDOGREL BISULFATE 75 MG: 75 TABLET ORAL at 09:04

## 2023-04-05 RX ADMIN — IPRATROPIUM BROMIDE AND ALBUTEROL SULFATE 3 ML: 2.5; .5 SOLUTION RESPIRATORY (INHALATION) at 12:04

## 2023-04-05 RX ADMIN — ATORVASTATIN CALCIUM 80 MG: 40 TABLET, FILM COATED ORAL at 09:04

## 2023-04-05 RX ADMIN — IPRATROPIUM BROMIDE AND ALBUTEROL SULFATE 3 ML: 2.5; .5 SOLUTION RESPIRATORY (INHALATION) at 03:04

## 2023-04-05 RX ADMIN — IPRATROPIUM BROMIDE AND ALBUTEROL SULFATE 3 ML: 2.5; .5 SOLUTION RESPIRATORY (INHALATION) at 08:04

## 2023-04-05 RX ADMIN — PREDNISONE 40 MG: 20 TABLET ORAL at 09:04

## 2023-04-05 RX ADMIN — GUAIFENESIN 600 MG: 600 TABLET, EXTENDED RELEASE ORAL at 09:04

## 2023-04-05 RX ADMIN — ASPIRIN 81 MG: 81 TABLET, COATED ORAL at 09:04

## 2023-04-05 NOTE — ASSESSMENT & PLAN NOTE
R MCA M2 branch occlusion.  Cont ASA/Plavix/Statin  Echo/bubble study neg  Consider ILR as outpatient

## 2023-04-05 NOTE — PLAN OF CARE
[facility] - Telemetry  Discharge Final Note  TN sent a secure chat to telemetry nurse Maxi that the patient is cleared for discharge from case management's viewpoint.  Primary Care Provider: Zoie Garcia MD    Expected Discharge Date: 4/5/2023    Final Discharge Note (most recent)       Final Note - 04/05/23 1105          Final Note    Assessment Type Final Discharge Note     Anticipated Discharge Disposition Home or Self Care     What phone number can be called within the next 1-3 days to see how you are doing after discharge? --   see chart    Hospital Resources/Appts/Education Provided Provided patient/caregiver with written discharge plan information;Appointments scheduled and added to AVS;Appointments scheduled by Navigator/Coordinator        Post-Acute Status    Post-Acute Authorization --   Out patient Physical Therapy    Discharge Delays None known at this time                     Important Message from Medicare             Contact Info       Martin Carney MD   Specialty: Cardiology    [address] [place] [address]  [facility] [place] [id number]   Phone: [phone]       Next Steps: Follow up on 4/19/2023    Instructions: Appointment scheduled for 7:30am    Zoie Garcia MD   Specialty: Internal Medicine   Relationship: PCP - General    [address] [place] [id number]   Phone: [phone]       Next Steps: Follow up on 4/13/2023    Instructions: Keep already scheduled appointment for 10:30am    OUT PATIENT CASE MANAGEMENT    WILL CALL PATIENT WITH AN APPT       Next Steps: Follow up    Instructions: out patient services

## 2023-04-05 NOTE — PT/OT/SLP PROGRESS
Physical Therapy Treatment    Patient Name:  Ivet Crabtree   MRN:  [id number]    Recommendations:     Discharge Recommendations: outpatient PT  Discharge Equipment Recommendations: none  Barriers to discharge: None    Assessment:     Ivet Crabtree is a 73 y.o. female admitted with a medical diagnosis of Acute CVA (cerebrovascular accident).  She presents with the following impairments/functional limitations: impaired endurance, decreased safety awareness .    Rehab Prognosis: Good; patient would benefit from acute skilled PT services to address these deficits and reach maximum level of function.    Recent Surgery: * No surgery found *      Plan:     During this hospitalization, patient to be seen 2 x/week to address the identified rehab impairments via gait training, therapeutic activities, therapeutic exercises and progress toward the following goals:    Plan of Care Expires:  04/18/23    Subjective     Chief Complaint:  none   Patient/Family Comments/goals: pt is agreeable to therapy   Pain/Comfort:  Pain Rating 1: 0/10  Pain Rating Post-Intervention 1: 0/10      Objective:     Communicated with nurse prior to session.  Patient found up in chair with telemetry upon PT entry to room.     General Precautions: Standard, fall  Orthopedic Precautions: N/A  Braces: N/A  Respiratory Status: Room air   SpO2 : 93%-96% on RA, HR  bpm  Functional Mobility:  Transfers:     Sit to Stand: x 12 times from chair modified independence with no AD  Gait:  pt ambulated 500 ft with no AD, S . Noted with min lateral swaying, decreased step length, no LOB. VC's for safety awareness and pursed lip breathing technique.   Balance: good         AM-PAC 6 CLICK MOBILITY  Turning over in bed (including adjusting bedclothes, sheets and blankets)?: 4  Sitting down on and standing up from a chair with arms (e.g., wheelchair, bedside commode, etc.): 4  Moving from lying on back to sitting on the side of the bed?: 4  Moving to and  from a bed to a chair (including a wheelchair)?: 4  Need to walk in hospital room?: 4  Climbing 3-5 steps with a railing?: 3  Basic Mobility Total Score: 23       Treatment & Education:  BLE HEP given with instructions and demonstrations (pt verbalized understanding). Encouraged pt to perform BLE ex's per handout throughout the day.      Patient left up in chair with all lines intact, call button in reach, nurse notified, and family present..    GOALS:   Multidisciplinary Problems       Physical Therapy Goals          Problem: Physical Therapy    Goal Priority Disciplines Outcome Goal Variances Interventions   Physical Therapy Goal     PT, PT/OT Ongoing, Progressing     Description: Goals to be met by: 23     Patient will increase functional independence with mobility by performin. Supine to sit with [place]  2. Rolling to Left and Right with [place]  3. Sit to stand transfer with [place]  4. Bed to chair transfer with [place]   5. Gait x500 feet with [place] using No Assistive Device   6. Lower extremity exercise program 2 sets x15 reps per handout, with independence                         Time Tracking:     PT Received On: 23  PT Start Time: 1108     PT Stop Time: 1120  PT Total Time (min): 12 min     Billable Minutes: Gait Training 12    Treatment Type: Treatment  PT/PTA: PTA     Number of PTA visits since last PT visit: 2023

## 2023-04-05 NOTE — PT/OT/SLP PROGRESS
Occupational Therapy   Treatment    Name: Ivet Crabtree  MRN: [id number]  Admitting Diagnosis:  Acute CVA (cerebrovascular accident)       Recommendations:     Discharge Recommendations: outpatient OT  Discharge Equipment Recommendations:  none  Barriers to discharge:  None    Assessment:     Ivet Crabtree is a 73 y.o. female with a medical diagnosis of Acute CVA (cerebrovascular accident).   Performance deficits affecting function are impaired endurance.    The patient was able to amb within the room (I) with no LOB. The patient was able to perform UE therex using red theraband x10 reps.     The patient will benefit from out patient OT .        Rehab Prognosis:  Good; patient would benefit from acute skilled OT services to address these deficits and reach maximum level of function.       Plan:     Patient to be seen 3 x/week to address the above listed problems via self-care/home management, therapeutic activities, therapeutic exercises  Plan of Care Expires: 04/18/23  Plan of Care Reviewed with: patient    Subjective     Chief Complaint: none  Patient/Family Comments/goals: go home today  Pain/Comfort:  Pain Rating 1: 0/10    Objective:     Communicated with: nurse prior to session.  Patient found HOB elevated with telemetry upon OT entry to room.    General Precautions: Standard, fall    Orthopedic Precautions:N/A  Braces: N/A  Respiratory Status: Room air     Occupational Performance:     Bed Mobility:    Patient completed Scooting/Bridging with modified independence  Patient completed Supine to Sit with modified independence     Functional Mobility/Transfers:  Patient completed Sit <> Stand Transfer with independence  with  no assistive device   Patient completed Toilet Transfer Step Transfer technique with independence with  no AD  Functional Mobility: The patient amb without AD to the bathroom and sink, no LOB. The patient was able to retrieve dropped items from the floor with no LOB.    Activities  of Daily Living:  Grooming: modified independence to wash hands and brush teeth while standing at the sink  Upper Body Dressing: contact guard assistance to don back gown  Lower Body Dressing: modified independence to don pants      AMPAC 6 Click ADL: 24    Treatment & Education:  Participated in self care and functional mobility as noted above  Pt completed  x 10 BUE HEP with red theraband in seated position:  Shoulder horizontal ab/dduction  External rotation  Shoulder flexion/extension with ab/dduction  Elbow extension  Elbow flexion   Handout placed with theraband placed within reach of pt   Pt encouraged to complete 1-2x10-15 reps a day  and repeat as able  Educated the patient re: benefits of sitting in the chair for meals    Patient left up in chair with all lines intact and call button in reach    GOALS:   Multidisciplinary Problems       Occupational Therapy Goals          Problem: Occupational Therapy    Goal Priority Disciplines Outcome Interventions   Occupational Therapy Goal     OT, PT/OT Adequate for Care Transition    Description: Goals to be met by: 4/18/23     Patient will increase functional independence with ADLs by performing:    Grooming while standing with [place].  Toileting from toilet with [place] for hygiene and clothing management.   Toilet transfer to toilet with [place].  Upper extremity exercise program x20 reps per handout, with independence.  Educate the patient re: Homes safety                       Time Tracking:     OT Date of Treatment: 04/05/23  OT Start Time: 1008  OT Stop Time: 1032  OT Total Time (min): 24 min    Billable Minutes:Self Care/Home Management 10  Therapeutic Exercise 14  Total Time 24    OT/MARCIA: OT          4/5/2023

## 2023-04-05 NOTE — NURSING
Pt states her other daughter will pick her up when she gets off work. Declined cab as she has no keys for the house.

## 2023-04-05 NOTE — NURSING
[facility], [facility]  Nurses Note -- 4 Eyes      4/4/2023       Skin assessed on: Q Shift      [x] No Pressure Injuries Present    [x]Prevention Measures Documented    [] Yes LDA  for Pressure Injury Previously documented     [] Yes New Pressure Injury Discovered   [] LDA for New Pressure Injury Added      Attending RN:  Sarika Morel RN     Second RN:  NOY Mohan

## 2023-04-05 NOTE — SUBJECTIVE & OBJECTIVE
Past Medical History:   Diagnosis Date    Anxiety     Depression     Heart attack     12 years ago    History of heart attack     Hypertension        Past Surgical History:   Procedure Laterality Date     SECTION      EYE SURGERY Bilateral     CATARACT       Review of patient's allergies indicates:   Allergen Reactions    [place] [pentazocine lactate] Shortness Of Breath    Ace inhibitors Other (See Comments)     cough       No current facility-administered medications on file prior to encounter.     Current Outpatient Medications on File Prior to Encounter   Medication Sig    albuterol (VENTOLIN HFA) 90 mcg/actuation inhaler Inhale 2 puffs into the lungs every 4 (four) hours as needed for Wheezing or Shortness of Breath. Rescue    aspirin (ECOTRIN) 325 MG EC tablet Take 325 mg by mouth once daily.    atorvastatin (LIPITOR) 40 MG tablet Take 40 mg by mouth once daily.    EPINEPHrine (EPIPEN JR) 0.15 mg/0.3 mL pen injection Inject 0.15 mg into the muscle.    famotidine (PEPCID) 20 MG tablet Take 20 mg by mouth 2 (two) times daily.    fluticasone propionate (FLONASE) 50 mcg/actuation nasal spray 1 spray by Each Nostril route once daily.    hydrochlorothiazide (HYDRODIURIL) 25 MG tablet Take 25 mg by mouth once daily.    metoprolol tartrate (LOPRESSOR) 50 MG tablet Take 50 mg by mouth 2 (two) times daily.    nitroGLYCERIN (NITROSTAT) 0.4 MG SL tablet Place 0.4 mg under the tongue.    pantoprazole (PROTONIX) 40 MG tablet Take 40 mg by mouth Daily.    potassium chloride (KLOR-CON) 10 MEQ TbSR Take 10 mEq by mouth Daily.    venlafaxine (EFFEXOR-XR) 150 MG Cp24 Take 150 mg by mouth once daily. TAKE ONE CAPSULE BY MOUTH EVERY MORNING. TAKE with 150mg  total dose OF 225mg    venlafaxine (EFFEXOR-XR) 75 MG 24 hr capsule Take 75 mg by mouth Daily.     Family History       Problem Relation (Age of Onset)    Diabetes Mother, Father, Sister    Heart disease Mother, Father, Sister    Hypertension Mother          Tobacco  Use    Smoking status: Every Day     Packs/day: 0.50     Types: Cigarettes    Smokeless tobacco: Never   Substance and Sexual Activity    Alcohol use: Yes     Comment: seldom    Drug use: No    Sexual activity: Not Currently     Review of Systems   Gastrointestinal:  Negative for melena.   Genitourinary:  Negative for hematuria.   Objective:     Vital Signs (Most Recent):  Temp: 97.6 °F (36.4 °C) (04/05/23 0504)  Pulse: 99 (04/05/23 0504)  Resp: 18 (04/05/23 0504)  BP: (!) 150/83 (04/05/23 0504)  SpO2: 95 % (04/05/23 0504)   Vital Signs (24h Range):  Temp:  [97.6 °F (36.4 °C)-98.6 °F (37 °C)] 97.6 °F (36.4 °C)  Pulse:  [57-99] 99  Resp:  [18-20] 18  SpO2:  [92 %-98 %] 95 %  BP: (145-167)/(67-83) 150/83     Weight: 101.2 kg (223 lb 1.7 oz)  Body mass index is 36.01 kg/m².    SpO2: 95 %       No intake or output data in the 24 hours ending 04/05/23 0709    Lines/Drains/Airways       Peripheral Intravenous Line  Duration                  Peripheral IV - Single Lumen 04/03/23 1753 20 G Right Antecubital 1 day                  Exam unchanged vs 4/4/23  Physical Exam  Constitutional:       General: She is not in acute distress.     Appearance: She is well-developed. She is obese. She is not ill-appearing, toxic-appearing or diaphoretic.   HENT:      Head: Normocephalic and atraumatic.   Eyes:      General: No scleral icterus.     Extraocular Movements: Extraocular movements intact.      Conjunctiva/sclera: Conjunctivae normal.      Pupils: Pupils are equal, round, and reactive to light.   Neck:      Vascular: No carotid bruit or JVD.      Trachea: No tracheal deviation.   Cardiovascular:      Rate and Rhythm: Normal rate and regular rhythm.      Pulses:           Carotid pulses are 2+ on the right side and 2+ on the left side.     Heart sounds: S1 normal and S2 normal. No murmur heard.    No friction rub. No gallop.   Pulmonary:      Effort: Pulmonary effort is normal. No respiratory distress.      Breath sounds: Normal  breath sounds. No stridor. No wheezing, rhonchi or rales.   Chest:      Chest wall: No tenderness.   Abdominal:      General: There is no distension.      Palpations: Abdomen is soft.   Musculoskeletal:         General: No swelling or tenderness. Normal range of motion.      Cervical back: Normal range of motion and neck supple. No rigidity.      Right lower leg: No edema.      Left lower leg: No edema.   Skin:     General: Skin is warm and dry.      Coloration: Skin is not jaundiced.   Neurological:      General: No focal deficit present.      Mental Status: She is alert and oriented to person, place, and time.      Cranial Nerves: No cranial nerve deficit.   Psychiatric:         Mood and Affect: Mood normal.         Behavior: Behavior normal.       Current Medications:   albuterol-ipratropium  3 mL Nebulization Q4H    aspirin  81 mg Oral Daily    atorvastatin  80 mg Oral Daily    clopidogreL  75 mg Oral Daily    guaiFENesin  600 mg Oral BID    predniSONE  40 mg Oral Daily       acetaminophen, benzonatate, melatonin, senna-docusate 8.6-50 mg, sodium chloride 0.9%    Laboratory (all labs reviewed):  CBC:  Recent Labs   Lab 09/27/21  0725 09/28/21  0335 04/03/23  1754 04/03/23  1754 04/04/23  0449 04/05/23  0512   WBC 12.63 10.77 11.48  --  10.43 12.62   Hemoglobin 16.4 H 15.4 15.3  --  14.7 14.1   POC Hematocrit  --   --   --  49  --   --    Hematocrit 47.4 46.7 46.9  --  44.1 44.0   Platelets 280 296 343  --  289 309         CHEMISTRIES:  Recent Labs   Lab 09/27/21  0725 09/28/21  0335 04/03/23  1754 04/04/23  0449 04/05/23  0512   Glucose 188 H 115 H 145 H 95 156 H   Sodium 141 143 139 141 140   Potassium 3.0 L 3.4 L 3.6 3.3 L 3.5   BUN 20 14 17 15 20   Creatinine 0.8 0.8 1.0 0.9 1.1   eGFR if non  >60 >60  --   --   --    eGFR  --   --  59 A >60 53 A   Calcium 9.9 9.6 9.3 9.0 9.1   Magnesium 2.1 2.2  --  2.0  --          CARDIAC BIOMARKERS:  Recent Labs   Lab 09/28/21  0737 09/28/21  4075  04/04/23  0449 04/04/23  1041 04/04/23  1708   Troponin I 0.071 H 0.052 H 1.714 H 2.211 H 1.065 H         COAGS:  Recent Labs   Lab 04/03/23  1754 04/04/23  0449   INR 1.2 1.1         LIPIDS/LFTS:  Recent Labs   Lab 09/27/21  0725 09/27/21  1505 09/28/21  0335 04/03/23  1754 04/04/23  0449 04/05/23  0512   Cholesterol  --  122  --  132  --   --    Triglycerides  --  88  --  114  --   --    HDL  --  46  --  43  --   --    LDL Cholesterol  --  58.4 L  --  66.2  --   --    Non-HDL Cholesterol  --  76  --  89  --   --    AST 19  --  22 24 32 23   ALT 30  --  29 27 29 23         BNP:  Recent Labs   Lab 04/04/23  1041    H         TSH:  Recent Labs   Lab 09/27/21  1505 04/03/23  1754   TSH 2.311 2.871         Free T4:        Diagnostic Results:  ECG (personally reviewed and interpreted tracing(s)):  4/3/23 1751 SR 59, low volt    CTA head/neck 4/4/23  1. Continued evolution of recent infarction in the right insula and adjoining white matter, as seen to better advantage on prior MR imaging of 04/03/2023.  No definite macroscopic hemorrhage.  No definite intercurrent ischemia additional vascular territories by noncontrast CT.  2. Occlusion of (RIGHT) proximal superior division M2 branch within the sylvian fissure.  There is some degree of thready opacification of more distal MCA branches, remaining diminutive in caliber when compared to the contralateral side.  3. Elsewhere, no additional areas of acute large vessel occlusion or flow-limiting stenosis.  Details of cervical and intracranial atherosclerotic disease, as provided in the body of the report.  4. Nonspecific sub 5 mm solid pulmonary nodules.  For multiple solid nodules all <6 mm, Fleischner Society 2017 guidelines recommend no routine follow up for a low risk patient, or follow up with non-contrast chest CT at 12 months after discovery in a high risk patient.    Echo/bubble study 4/4/23 (images personally reviewed and interpreted)  Limited echo performed  for bubble study.  There is no evidence of intracardiac shunting (negative bubble study).    Echo: 4/4/23 (images prev personally reviewed and interpreted)  The left ventricle is normal in size with mild concentric hypertrophy and normal systolic function.  The estimated ejection fraction is 70%.  Normal right ventricular size with normal right ventricular systolic function.  The estimated PA systolic pressure is 34 mmHg.  No intracardiac source of embolus noted on this exam. If high clinical suspicion, consider SAI +/- bubble study.    Holter 3/20/23 (Care everywhere)  Maximum heart rate  115 bpm   Minimum heart rate  45 bpm   Average heart rate  57 bpm   Sinus rhythm   PVCs - 162 with 2 VE couplets and one 9 beat run of VTACH @ 154 bpm   SVEs - 374 with 8 episodes of SVT - longest 10beats, fastest 128 bpm   Ischemia: Negative   Arrhythmias:  Positive for VTACH and SVT    Stress Test: L MPI 9/28/21    Normal myocardial perfusion scan. There is no evidence of myocardial ischemia or infarction.    There is a  mild intensity fixed defect in the apical wall of the left ventricle secondary to diaphragm attenuation.    The gated perfusion images showed an ejection fraction of 83% post stress.    There is normal wall motion post stress.    The EKG portion of this study is negative for ischemia.    The patient reported chest pain during the stress test.    There were no arrhythmias during stress.    Cath 4/2001 (care everywhere Dr. Carney note 8/31/22)  ADMITTED WITH AN INFERIOR POSTERIOR MI, UNDERWENT ANGIOGRAPHY THAT SHOWED MILD CORONARY ARTERY DISEASE, MEDICAL THERAPY RECOMMENDED.

## 2023-04-05 NOTE — HOSPITAL COURSE
Interval Hx: no cp/sob/palps or new neuro sxs.    Tele: SR 80s, no AF (personally reviewed and interpreted)

## 2023-04-05 NOTE — PLAN OF CARE
Problem: Adult Inpatient Plan of Care  Goal: Plan of Care Review  Outcome: Ongoing, Progressing     Problem: Adjustment to Illness (Stroke, Ischemic/Transient Ischemic Attack)  Goal: Optimal Coping  Outcome: Ongoing, Progressing     Problem: Bowel Elimination Impaired (Stroke, Ischemic/Transient Ischemic Attack)  Goal: Effective Bowel Elimination  Outcome: Ongoing, Progressing     Problem: Cerebral Tissue Perfusion (Stroke, Ischemic/Transient Ischemic Attack)  Goal: Optimal Cerebral Tissue Perfusion  Outcome: Ongoing, Progressing     Problem: Cognitive Impairment (Stroke, Ischemic/Transient Ischemic Attack)  Goal: Optimal Cognitive Function  Outcome: Ongoing, Progressing     Problem: Communication Impairment (Stroke, Ischemic/Transient Ischemic Attack)  Goal: Improved Communication Skills  Outcome: Ongoing, Progressing     Problem: Functional Ability Impaired (Stroke, Ischemic/Transient Ischemic Attack)  Goal: Optimal Functional Ability  Outcome: Ongoing, Progressing     Problem: Respiratory Compromise (Stroke, Ischemic/Transient Ischemic Attack)  Goal: Effective Oxygenation and Ventilation  Outcome: Ongoing, Progressing     Problem: Sensorimotor Impairment (Stroke, Ischemic/Transient Ischemic Attack)  Goal: Improved Sensorimotor Function  Outcome: Ongoing, Progressing     Problem: Swallowing Impairment (Stroke, Ischemic/Transient Ischemic Attack)  Goal: Optimal Eating and Swallowing without Aspiration  Outcome: Ongoing, Progressing     Problem: Urinary Elimination Impaired (Stroke, Ischemic/Transient Ischemic Attack)  Goal: Effective Urinary Elimination  Outcome: Ongoing, Progressing     Problem: Fall Injury Risk  Goal: Absence of Fall and Fall-Related Injury  Outcome: Ongoing, Progressing     Problem: Infection  Goal: Absence of Infection Signs and Symptoms  Outcome: Ongoing, Progressing

## 2023-04-05 NOTE — PLAN OF CARE
Problem: Occupational Therapy  Goal: Occupational Therapy Goal  Description: Goals to be met by: 4/18/23     Patient will increase functional independence with ADLs by performing:    Grooming while standing with [place].  Toileting from toilet with [place] for hygiene and clothing management.   Toilet transfer to toilet with [place].  Upper extremity exercise program x20 reps per handout, with independence.  Educate the patient re: Homes safety  Outcome: Adequate for Care Transition   The patient was able to amb within the room (I) with no LOB. The patient was able to perform UE therex using red theraband x10 reps.    The patient will benefit from out patient OT .

## 2023-04-05 NOTE — NURSING
Dc instructions and AVS provided and reviewed. All questions answered. Pt reports readiness for dc. Pt to be dc with daughter. Daughter having car trouble per pt but will pick her up today.

## 2023-04-05 NOTE — PLAN OF CARE
Problem: Adult Inpatient Plan of Care  Goal: Plan of Care Review  Outcome: Met  Goal: Patient-Specific Goal (Individualized)  Outcome: Met  Goal: Absence of Hospital-Acquired Illness or Injury  Outcome: Met  Goal: Optimal Comfort and Wellbeing  Outcome: Met  Goal: Readiness for Transition of Care  Outcome: Met     Problem: Adjustment to Illness (Stroke, Ischemic/Transient Ischemic Attack)  Goal: Optimal Coping  Outcome: Met     Problem: Bowel Elimination Impaired (Stroke, Ischemic/Transient Ischemic Attack)  Goal: Effective Bowel Elimination  Outcome: Met     Problem: Cerebral Tissue Perfusion (Stroke, Ischemic/Transient Ischemic Attack)  Goal: Optimal Cerebral Tissue Perfusion  Outcome: Met     Problem: Cognitive Impairment (Stroke, Ischemic/Transient Ischemic Attack)  Goal: Optimal Cognitive Function  Outcome: Met     Problem: Communication Impairment (Stroke, Ischemic/Transient Ischemic Attack)  Goal: Improved Communication Skills  Outcome: Met     Problem: Functional Ability Impaired (Stroke, Ischemic/Transient Ischemic Attack)  Goal: Optimal Functional Ability  Outcome: Met     Problem: Respiratory Compromise (Stroke, Ischemic/Transient Ischemic Attack)  Goal: Effective Oxygenation and Ventilation  Outcome: Met     Problem: Sensorimotor Impairment (Stroke, Ischemic/Transient Ischemic Attack)  Goal: Improved Sensorimotor Function  Outcome: Met     Problem: Swallowing Impairment (Stroke, Ischemic/Transient Ischemic Attack)  Goal: Optimal Eating and Swallowing without Aspiration  Outcome: Met     Problem: Urinary Elimination Impaired (Stroke, Ischemic/Transient Ischemic Attack)  Goal: Effective Urinary Elimination  Outcome: Met     Problem: Fall Injury Risk  Goal: Absence of Fall and Fall-Related Injury  Outcome: Met     Problem: Infection  Goal: Absence of Infection Signs and Symptoms  Outcome: Met     Problem: SLP  Goal: SLP Goal  Description: 1. Pt will tolerate regular with thin liquids without overt s/s of  aspiration  2. Pt will participate in ongoing assessment of cognitive linguistic deficits  3. Pt will independently perform speech intelligibility compensation strategies  Outcome: Met     Problem: Occupational Therapy  Goal: Occupational Therapy Goal  Description: Goals to be met by: 23     Patient will increase functional independence with ADLs by performing:    Grooming while standing with [place].  Toileting from toilet with [place] for hygiene and clothing management.   Toilet transfer to toilet with [place].  Upper extremity exercise program x20 reps per handout, with independence.  Educate the patient re: Homes safety  Outcome: Met     Problem: Physical Therapy  Goal: Physical Therapy Goal  Description: Goals to be met by: 23     Patient will increase functional independence with mobility by performin. Supine to sit with [place]  2. Rolling to Left and Right with [place]  3. Sit to stand transfer with [place]  4. Bed to chair transfer with [place]   5. Gait x500 feet with [place] using No Assistive Device   6. Lower extremity exercise program 2 sets x15 reps per handout, with independence    Outcome: Met

## 2023-04-05 NOTE — PROGRESS NOTES
[facility] Telemetry  Cardiology  Progress Note    Patient Name: Ivet Crabtree  MRN: [id number]  Admission Date: 4/3/2023  Hospital Length of Stay: 1 days  Code Status: Full Code   Attending Physician: Ben Belcher MD   Primary Care Physician: Zoie Garcia MD  Expected Discharge Date:   Principal Problem:Acute CVA (cerebrovascular accident)    Subjective:     Interval Hx: no cp/sob/palps or new neuro sxs.    Tele: SR 80s, no AF (personally reviewed and interpreted)        Past Medical History:   Diagnosis Date    Anxiety     Depression     Heart attack     12 years ago    History of heart attack     Hypertension        Past Surgical History:   Procedure Laterality Date     SECTION      EYE SURGERY Bilateral     CATARACT       Review of patient's allergies indicates:   Allergen Reactions    Talwin [pentazocine lactate] Shortness Of Breath    Ace inhibitors Other (See Comments)     cough       No current facility-administered medications on file prior to encounter.     Current Outpatient Medications on File Prior to Encounter   Medication Sig    albuterol (VENTOLIN HFA) 90 mcg/actuation inhaler Inhale 2 puffs into the lungs every 4 (four) hours as needed for Wheezing or Shortness of Breath. Rescue    aspirin (ECOTRIN) 325 MG EC tablet Take 325 mg by mouth once daily.    atorvastatin (LIPITOR) 40 MG tablet Take 40 mg by mouth once daily.    EPINEPHrine (EPIPEN JR) 0.15 mg/0.3 mL pen injection Inject 0.15 mg into the muscle.    famotidine (PEPCID) 20 MG tablet Take 20 mg by mouth 2 (two) times daily.    fluticasone propionate (FLONASE) 50 mcg/actuation nasal spray 1 spray by Each Nostril route once daily.    hydrochlorothiazide (HYDRODIURIL) 25 MG tablet Take 25 mg by mouth once daily.    metoprolol tartrate (LOPRESSOR) 50 MG tablet Take 50 mg by mouth 2 (two) times daily.    nitroGLYCERIN (NITROSTAT) 0.4 MG SL tablet Place 0.4 mg under the tongue.    pantoprazole (PROTONIX) 40 MG  tablet Take 40 mg by mouth Daily.    potassium chloride (KLOR-CON) 10 MEQ TbSR Take 10 mEq by mouth Daily.    venlafaxine (EFFEXOR-XR) 150 MG Cp24 Take 150 mg by mouth once daily. TAKE ONE CAPSULE BY MOUTH EVERY MORNING. TAKE with 150mg  total dose OF 225mg    venlafaxine (EFFEXOR-XR) 75 MG 24 hr capsule Take 75 mg by mouth Daily.     Family History       Problem Relation (Age of Onset)    Diabetes Mother, Father, Sister    Heart disease Mother, Father, Sister    Hypertension Mother          Tobacco Use    Smoking status: Every Day     Packs/day: 0.50     Types: Cigarettes    Smokeless tobacco: Never   Substance and Sexual Activity    Alcohol use: Yes     Comment: seldom    Drug use: No    Sexual activity: Not Currently     Review of Systems   Gastrointestinal:  Negative for melena.   Genitourinary:  Negative for hematuria.   Objective:     Vital Signs (Most Recent):  Temp: 97.6 °F (36.4 °C) (04/05/23 0504)  Pulse: 99 (04/05/23 0504)  Resp: 18 (04/05/23 0504)  BP: (!) 150/83 (04/05/23 0504)  SpO2: 95 % (04/05/23 0504)   Vital Signs (24h Range):  Temp:  [97.6 °F (36.4 °C)-98.6 °F (37 °C)] 97.6 °F (36.4 °C)  Pulse:  [57-99] 99  Resp:  [18-20] 18  SpO2:  [92 %-98 %] 95 %  BP: (145-167)/(67-83) 150/83     Weight: 101.2 kg (223 lb 1.7 oz)  Body mass index is 36.01 kg/m².    SpO2: 95 %       No intake or output data in the 24 hours ending 04/05/23 0709    Lines/Drains/Airways       Peripheral Intravenous Line  Duration                  Peripheral IV - Single Lumen 04/03/23 1753 20 G Right Antecubital 1 day                  Exam unchanged vs 4/4/23  Physical Exam  Constitutional:       General: She is not in acute distress.     Appearance: She is well-developed. She is obese. She is not ill-appearing, toxic-appearing or diaphoretic.   HENT:      Head: Normocephalic and atraumatic.   Eyes:      General: No scleral icterus.     Extraocular Movements: Extraocular movements intact.      Conjunctiva/sclera: Conjunctivae  normal.      Pupils: Pupils are equal, round, and reactive to light.   Neck:      Vascular: No carotid bruit or JVD.      Trachea: No tracheal deviation.   Cardiovascular:      Rate and Rhythm: Normal rate and regular rhythm.      Pulses:           Carotid pulses are 2+ on the right side and 2+ on the left side.     Heart sounds: S1 normal and S2 normal. No murmur heard.    No friction rub. No gallop.   Pulmonary:      Effort: Pulmonary effort is normal. No respiratory distress.      Breath sounds: Normal breath sounds. No stridor. No wheezing, rhonchi or rales.   Chest:      Chest wall: No tenderness.   Abdominal:      General: There is no distension.      Palpations: Abdomen is soft.   Musculoskeletal:         General: No swelling or tenderness. Normal range of motion.      Cervical back: Normal range of motion and neck supple. No rigidity.      Right lower leg: No edema.      Left lower leg: No edema.   Skin:     General: Skin is warm and dry.      Coloration: Skin is not jaundiced.   Neurological:      General: No focal deficit present.      Mental Status: She is alert and oriented to person, place, and time.      Cranial Nerves: No cranial nerve deficit.   Psychiatric:         Mood and Affect: Mood normal.         Behavior: Behavior normal.       Current Medications:   albuterol-ipratropium  3 mL Nebulization Q4H    aspirin  81 mg Oral Daily    atorvastatin  80 mg Oral Daily    clopidogreL  75 mg Oral Daily    guaiFENesin  600 mg Oral BID    predniSONE  40 mg Oral Daily       acetaminophen, benzonatate, melatonin, senna-docusate 8.6-50 mg, sodium chloride 0.9%    Laboratory (all labs reviewed):  CBC:  Recent Labs   Lab 09/27/21  0725 09/28/21  0335 04/03/23  1754 04/03/23  1754 04/04/23  0449 04/05/23  0512   WBC 12.63 10.77 11.48  --  10.43 12.62   Hemoglobin 16.4 H 15.4 15.3  --  14.7 14.1   POC Hematocrit  --   --   --  49  --   --    Hematocrit 47.4 46.7 46.9  --  44.1 44.0   Platelets 280 296 343  --   289 309         CHEMISTRIES:  Recent Labs   Lab 09/27/21  0725 09/28/21  0335 04/03/23  1754 04/04/23  0449 04/05/23  0512   Glucose 188 H 115 H 145 H 95 156 H   Sodium 141 143 139 141 140   Potassium 3.0 L 3.4 L 3.6 3.3 L 3.5   BUN 20 14 17 15 20   Creatinine 0.8 0.8 1.0 0.9 1.1   eGFR if non  >60 >60  --   --   --    eGFR  --   --  59 A >60 53 A   Calcium 9.9 9.6 9.3 9.0 9.1   Magnesium 2.1 2.2  --  2.0  --          CARDIAC BIOMARKERS:  Recent Labs   Lab 09/28/21  0737 09/28/21  1329 04/04/23  0449 04/04/23  1041 04/04/23  1708   Troponin I 0.071 H 0.052 H 1.714 H 2.211 H 1.065 H         COAGS:  Recent Labs   Lab 04/03/23  1754 04/04/23  0449   INR 1.2 1.1         LIPIDS/LFTS:  Recent Labs   Lab 09/27/21  0725 09/27/21  1505 09/28/21  0335 04/03/23  1754 04/04/23  0449 04/05/23  0512   Cholesterol  --  122  --  132  --   --    Triglycerides  --  88  --  114  --   --    HDL  --  46  --  43  --   --    LDL Cholesterol  --  58.4 L  --  66.2  --   --    Non-HDL Cholesterol  --  76  --  89  --   --    AST 19  --  22 24 32 23   ALT 30  --  29 27 29 23         BNP:  Recent Labs   Lab 04/04/23  1041    H         TSH:  Recent Labs   Lab 09/27/21  1505 04/03/23  1754   TSH 2.311 2.871         Free T4:        Diagnostic Results:  ECG (personally reviewed and interpreted tracing(s)):  4/3/23 1751 SR 59, low volt    CTA head/neck 4/4/23  1. Continued evolution of recent infarction in the right insula and adjoining white matter, as seen to better advantage on prior MR imaging of 04/03/2023.  No definite macroscopic hemorrhage.  No definite intercurrent ischemia additional vascular territories by noncontrast CT.  2. Occlusion of (RIGHT) proximal superior division M2 branch within the sylvian fissure.  There is some degree of thready opacification of more distal MCA branches, remaining diminutive in caliber when compared to the contralateral side.  3. Elsewhere, no additional areas of acute large vessel  occlusion or flow-limiting stenosis.  Details of cervical and intracranial atherosclerotic disease, as provided in the body of the report.  4. Nonspecific sub 5 mm solid pulmonary nodules.  For multiple solid nodules all <6 mm, Fleischner Society 2017 guidelines recommend no routine follow up for a low risk patient, or follow up with non-contrast chest CT at 12 months after discovery in a high risk patient.    Echo/bubble study 4/4/23 (images personally reviewed and interpreted)   Limited echo performed for bubble study.   There is no evidence of intracardiac shunting (negative bubble study).    Echo: 4/4/23 (images prev personally reviewed and interpreted)   The left ventricle is normal in size with mild concentric hypertrophy and normal systolic function.   The estimated ejection fraction is 70%.   Normal right ventricular size with normal right ventricular systolic function.   The estimated PA systolic pressure is 34 mmHg.   No intracardiac source of embolus noted on this exam. If high clinical suspicion, consider SAI +/- bubble study.    Holter 3/20/23 (Care everywhere)  Maximum heart rate  115 bpm   Minimum heart rate  45 bpm   Average heart rate  57 bpm   Sinus rhythm   PVCs - 162 with 2 VE couplets and one 9 beat run of VTACH @ 154 bpm   SVEs - 374 with 8 episodes of SVT - longest 10beats, fastest 128 bpm   Ischemia: Negative   Arrhythmias:  Positive for VTACH and SVT    Stress Test: L MPI 9/28/21    Normal myocardial perfusion scan. There is no evidence of myocardial ischemia or infarction.    There is a  mild intensity fixed defect in the apical wall of the left ventricle secondary to diaphragm attenuation.    The gated perfusion images showed an ejection fraction of 83% post stress.    There is normal wall motion post stress.    The EKG portion of this study is negative for ischemia.    The patient reported chest pain during the stress test.    There were no arrhythmias during stress.    Cath  4/2001 (care everywhere Dr. Carney note 8/31/22)  ADMITTED WITH AN INFERIOR POSTERIOR MI, UNDERWENT ANGIOGRAPHY THAT SHOWED MILD CORONARY ARTERY DISEASE, MEDICAL THERAPY RECOMMENDED.     Assessment and Plan:     * Acute CVA (cerebrovascular accident)  R MCA M2 branch occlusion.  Cont ASA/Plavix/Statin  Echo/bubble study neg  Consider ILR as outpatient    Essential hypertension  Cont med rx    Elevated troponin, hx of CAD   Trop 2, ?r/t CVA  No anginal sxs.  EKG/echo normal.  Prior hx MI 2001 with nonobst CAD by cath at that time.  Neg MPI 9/2021.  Doubt ACS.  Consider outpat MPI after she recovers from CVA.    Dyslipidemia  Cont atorva 80mg qhs  Check lipids/LFT 6 months         VTE Risk Mitigation (From admission, onward)         Ordered     Place TANESHA hose  Until discontinued         04/03/23 2008     IP VTE HIGH RISK PATIENT  Once         04/03/23 2008     Place sequential compression device  Until discontinued         04/03/23 2008              Dispo planning appropriate  No further inpat cardiac testing planned.  Cardiology will sign off, pls call with questions.  Pt wishes to follow up with Dr. Carney after discharge.    Riley Agustin MD  Cardiology  [facility]

## 2023-04-06 ENCOUNTER — PATIENT OUTREACH (OUTPATIENT)
Dept: ADMINISTRATIVE | Facility: OTHER | Age: 73
End: 2023-04-06
Payer: COMMERCIAL

## 2023-04-06 ENCOUNTER — PATIENT OUTREACH (OUTPATIENT)
Dept: ADMINISTRATIVE | Facility: CLINIC | Age: 73
End: 2023-04-06
Payer: COMMERCIAL

## 2023-04-06 ENCOUNTER — HOSPITAL ENCOUNTER (OUTPATIENT)
Facility: HOSPITAL | Age: 73
Discharge: HOME HEALTH | DRG: 065 | End: 2023-04-10
Attending: EMERGENCY MEDICINE | Admitting: EMERGENCY MEDICINE
Payer: COMMERCIAL

## 2023-04-06 DIAGNOSIS — I63.511 STROKE DUE TO OCCLUSION OF RIGHT MIDDLE CEREBRAL ARTERY: Primary | ICD-10-CM

## 2023-04-06 DIAGNOSIS — I63.9 STROKE: ICD-10-CM

## 2023-04-06 DIAGNOSIS — I48.91 A-FIB: ICD-10-CM

## 2023-04-06 DIAGNOSIS — R07.9 CHEST PAIN: ICD-10-CM

## 2023-04-06 LAB
ALBUMIN SERPL BCP-MCNC: 3.7 G/DL (ref 3.5–5.2)
ALP SERPL-CCNC: 118 U/L (ref 55–135)
ALT SERPL W/O P-5'-P-CCNC: 25 U/L (ref 10–44)
ANION GAP SERPL CALC-SCNC: 11 MMOL/L (ref 8–16)
ANION GAP SERPL CALC-SCNC: 18 MMOL/L (ref 8–16)
AST SERPL-CCNC: 18 U/L (ref 10–40)
BASOPHILS # BLD AUTO: 0.08 K/UL (ref 0–0.2)
BASOPHILS NFR BLD: 0.6 % (ref 0–1.9)
BILIRUB SERPL-MCNC: 0.4 MG/DL (ref 0.1–1)
BUN SERPL-MCNC: 20 MG/DL (ref 8–23)
BUN SERPL-MCNC: 22 MG/DL (ref 6–30)
CALCIUM SERPL-MCNC: 8.8 MG/DL (ref 8.7–10.5)
CHLORIDE SERPL-SCNC: 107 MMOL/L (ref 95–110)
CHLORIDE SERPL-SCNC: 111 MMOL/L (ref 95–110)
CHOLEST SERPL-MCNC: 130 MG/DL (ref 120–199)
CHOLEST/HDLC SERPL: 2.5 {RATIO} (ref 2–5)
CO2 SERPL-SCNC: 18 MMOL/L (ref 23–29)
CREAT SERPL-MCNC: 0.7 MG/DL (ref 0.5–1.4)
CREAT SERPL-MCNC: 0.8 MG/DL (ref 0.5–1.4)
DIFFERENTIAL METHOD: ABNORMAL
EOSINOPHIL # BLD AUTO: 0.1 K/UL (ref 0–0.5)
EOSINOPHIL NFR BLD: 0.6 % (ref 0–8)
ERYTHROCYTE [DISTWIDTH] IN BLOOD BY AUTOMATED COUNT: 14.4 % (ref 11.5–14.5)
EST. GFR  (NO RACE VARIABLE): >60 ML/MIN/1.73 M^2
GLUCOSE SERPL-MCNC: 156 MG/DL (ref 70–110)
GLUCOSE SERPL-MCNC: 162 MG/DL (ref 70–110)
HCT VFR BLD AUTO: 46.4 % (ref 37–48.5)
HCT VFR BLD CALC: 47 %PCV (ref 36–54)
HDLC SERPL-MCNC: 51 MG/DL (ref 40–75)
HDLC SERPL: 39.2 % (ref 20–50)
HGB BLD-MCNC: 15.3 G/DL (ref 12–16)
IMM GRANULOCYTES # BLD AUTO: 0.09 K/UL (ref 0–0.04)
IMM GRANULOCYTES NFR BLD AUTO: 0.6 % (ref 0–0.5)
INR PPP: 1.1 (ref 0.8–1.2)
LDLC SERPL CALC-MCNC: 55 MG/DL (ref 63–159)
LYMPHOCYTES # BLD AUTO: 2.8 K/UL (ref 1–4.8)
LYMPHOCYTES NFR BLD: 19.9 % (ref 18–48)
MCH RBC QN AUTO: 29.7 PG (ref 27–31)
MCHC RBC AUTO-ENTMCNC: 33 G/DL (ref 32–36)
MCV RBC AUTO: 90 FL (ref 82–98)
MONOCYTES # BLD AUTO: 1.3 K/UL (ref 0.3–1)
MONOCYTES NFR BLD: 9.1 % (ref 4–15)
NEUTROPHILS # BLD AUTO: 9.9 K/UL (ref 1.8–7.7)
NEUTROPHILS NFR BLD: 69.2 % (ref 38–73)
NONHDLC SERPL-MCNC: 79 MG/DL
NRBC BLD-RTO: 0 /100 WBC
PLATELET # BLD AUTO: 361 K/UL (ref 150–450)
PMV BLD AUTO: 10.5 FL (ref 9.2–12.9)
POC IONIZED CALCIUM: 1.22 MMOL/L (ref 1.06–1.42)
POC TCO2 (MEASURED): 23 MMOL/L (ref 23–29)
POCT GLUCOSE: 136 MG/DL (ref 70–110)
POTASSIUM BLD-SCNC: 3.8 MMOL/L (ref 3.5–5.1)
POTASSIUM SERPL-SCNC: 3.9 MMOL/L (ref 3.5–5.1)
PROT SERPL-MCNC: 7.3 G/DL (ref 6–8.4)
PROTHROMBIN TIME: 11.4 SEC (ref 9–12.5)
RBC # BLD AUTO: 5.16 M/UL (ref 4–5.4)
SAMPLE: ABNORMAL
SODIUM BLD-SCNC: 143 MMOL/L (ref 136–145)
SODIUM SERPL-SCNC: 140 MMOL/L (ref 136–145)
TRIGL SERPL-MCNC: 120 MG/DL (ref 30–150)
TROPONIN I SERPL DL<=0.01 NG/ML-MCNC: 0.61 NG/ML (ref 0–0.03)
TSH SERPL DL<=0.005 MIU/L-ACNC: 3.77 UIU/ML (ref 0.4–4)
WBC # BLD AUTO: 14.26 K/UL (ref 3.9–12.7)

## 2023-04-06 PROCEDURE — G0378 HOSPITAL OBSERVATION PER HR: HCPCS

## 2023-04-06 PROCEDURE — 21400001 HC TELEMETRY ROOM

## 2023-04-06 PROCEDURE — 96375 TX/PRO/DX INJ NEW DRUG ADDON: CPT

## 2023-04-06 PROCEDURE — 84132 ASSAY OF SERUM POTASSIUM: CPT

## 2023-04-06 PROCEDURE — 80061 LIPID PANEL: CPT | Performed by: EMERGENCY MEDICINE

## 2023-04-06 PROCEDURE — 93010 ELECTROCARDIOGRAM REPORT: CPT | Mod: ,,, | Performed by: INTERNAL MEDICINE

## 2023-04-06 PROCEDURE — 82330 ASSAY OF CALCIUM: CPT

## 2023-04-06 PROCEDURE — 93010 EKG 12-LEAD: ICD-10-PCS | Mod: ,,, | Performed by: INTERNAL MEDICINE

## 2023-04-06 PROCEDURE — 84443 ASSAY THYROID STIM HORMONE: CPT | Performed by: EMERGENCY MEDICINE

## 2023-04-06 PROCEDURE — 82565 ASSAY OF CREATININE: CPT

## 2023-04-06 PROCEDURE — 96374 THER/PROPH/DIAG INJ IV PUSH: CPT

## 2023-04-06 PROCEDURE — 94640 AIRWAY INHALATION TREATMENT: CPT

## 2023-04-06 PROCEDURE — 84295 ASSAY OF SERUM SODIUM: CPT

## 2023-04-06 PROCEDURE — 27000221 HC OXYGEN, UP TO 24 HOURS

## 2023-04-06 PROCEDURE — 80048 BASIC METABOLIC PNL TOTAL CA: CPT | Mod: XB

## 2023-04-06 PROCEDURE — 99900035 HC TECH TIME PER 15 MIN (STAT)

## 2023-04-06 PROCEDURE — 82962 GLUCOSE BLOOD TEST: CPT

## 2023-04-06 PROCEDURE — 99291 CRITICAL CARE FIRST HOUR: CPT

## 2023-04-06 PROCEDURE — 25000003 PHARM REV CODE 250: Performed by: STUDENT IN AN ORGANIZED HEALTH CARE EDUCATION/TRAINING PROGRAM

## 2023-04-06 PROCEDURE — 25000242 PHARM REV CODE 250 ALT 637 W/ HCPCS: Performed by: STUDENT IN AN ORGANIZED HEALTH CARE EDUCATION/TRAINING PROGRAM

## 2023-04-06 PROCEDURE — 93005 ELECTROCARDIOGRAM TRACING: CPT

## 2023-04-06 PROCEDURE — G0427 INPT/ED TELECONSULT70: HCPCS | Mod: 95,,, | Performed by: STUDENT IN AN ORGANIZED HEALTH CARE EDUCATION/TRAINING PROGRAM

## 2023-04-06 PROCEDURE — 27000646 HC AEROBIKA DEVICE

## 2023-04-06 PROCEDURE — 94664 DEMO&/EVAL PT USE INHALER: CPT

## 2023-04-06 PROCEDURE — 63600175 PHARM REV CODE 636 W HCPCS: Performed by: STUDENT IN AN ORGANIZED HEALTH CARE EDUCATION/TRAINING PROGRAM

## 2023-04-06 PROCEDURE — 25000003 PHARM REV CODE 250: Performed by: EMERGENCY MEDICINE

## 2023-04-06 PROCEDURE — 96361 HYDRATE IV INFUSION ADD-ON: CPT

## 2023-04-06 PROCEDURE — 63600175 PHARM REV CODE 636 W HCPCS: Performed by: EMERGENCY MEDICINE

## 2023-04-06 PROCEDURE — 85610 PROTHROMBIN TIME: CPT | Performed by: EMERGENCY MEDICINE

## 2023-04-06 PROCEDURE — 11000001 HC ACUTE MED/SURG PRIVATE ROOM

## 2023-04-06 PROCEDURE — G0427 PR INPT TELEHEALTH CON 70/>M: ICD-10-PCS | Mod: 95,,, | Performed by: STUDENT IN AN ORGANIZED HEALTH CARE EDUCATION/TRAINING PROGRAM

## 2023-04-06 PROCEDURE — 85014 HEMATOCRIT: CPT

## 2023-04-06 PROCEDURE — 85025 COMPLETE CBC W/AUTO DIFF WBC: CPT | Performed by: EMERGENCY MEDICINE

## 2023-04-06 PROCEDURE — 80053 COMPREHEN METABOLIC PANEL: CPT | Performed by: EMERGENCY MEDICINE

## 2023-04-06 PROCEDURE — 94761 N-INVAS EAR/PLS OXIMETRY MLT: CPT

## 2023-04-06 PROCEDURE — 99223 PR INITIAL HOSPITAL CARE,LEVL III: ICD-10-PCS | Mod: ,,, | Performed by: STUDENT IN AN ORGANIZED HEALTH CARE EDUCATION/TRAINING PROGRAM

## 2023-04-06 PROCEDURE — 84484 ASSAY OF TROPONIN QUANT: CPT | Performed by: EMERGENCY MEDICINE

## 2023-04-06 PROCEDURE — 99223 1ST HOSP IP/OBS HIGH 75: CPT | Mod: ,,, | Performed by: STUDENT IN AN ORGANIZED HEALTH CARE EDUCATION/TRAINING PROGRAM

## 2023-04-06 RX ORDER — MUPIROCIN 20 MG/G
OINTMENT TOPICAL 2 TIMES DAILY
Status: DISCONTINUED | OUTPATIENT
Start: 2023-04-06 | End: 2023-04-10 | Stop reason: HOSPADM

## 2023-04-06 RX ORDER — GLUCAGON 1 MG
1 KIT INJECTION
Status: DISCONTINUED | OUTPATIENT
Start: 2023-04-06 | End: 2023-04-10 | Stop reason: HOSPADM

## 2023-04-06 RX ORDER — LORAZEPAM 2 MG/ML
1.5 INJECTION INTRAMUSCULAR
Status: COMPLETED | OUTPATIENT
Start: 2023-04-06 | End: 2023-04-06

## 2023-04-06 RX ORDER — METOPROLOL TARTRATE 50 MG/1
50 TABLET ORAL 2 TIMES DAILY
Status: DISCONTINUED | OUTPATIENT
Start: 2023-04-06 | End: 2023-04-06

## 2023-04-06 RX ORDER — IBUPROFEN 200 MG
16 TABLET ORAL
Status: DISCONTINUED | OUTPATIENT
Start: 2023-04-06 | End: 2023-04-10 | Stop reason: HOSPADM

## 2023-04-06 RX ORDER — PREDNISONE 20 MG/1
40 TABLET ORAL DAILY
Status: DISCONTINUED | OUTPATIENT
Start: 2023-04-07 | End: 2023-04-07

## 2023-04-06 RX ORDER — ASPIRIN 81 MG/1
81 TABLET ORAL DAILY
Status: DISCONTINUED | OUTPATIENT
Start: 2023-04-06 | End: 2023-04-09

## 2023-04-06 RX ORDER — PANTOPRAZOLE SODIUM 40 MG/1
40 TABLET, DELAYED RELEASE ORAL DAILY
Status: DISCONTINUED | OUTPATIENT
Start: 2023-04-06 | End: 2023-04-10 | Stop reason: HOSPADM

## 2023-04-06 RX ORDER — ATORVASTATIN CALCIUM 40 MG/1
80 TABLET, FILM COATED ORAL DAILY
Status: DISCONTINUED | OUTPATIENT
Start: 2023-04-06 | End: 2023-04-10 | Stop reason: HOSPADM

## 2023-04-06 RX ORDER — VENLAFAXINE HYDROCHLORIDE 37.5 MG/1
75 CAPSULE, EXTENDED RELEASE ORAL DAILY
Status: DISCONTINUED | OUTPATIENT
Start: 2023-04-06 | End: 2023-04-07

## 2023-04-06 RX ORDER — TALC
6 POWDER (GRAM) TOPICAL NIGHTLY
Status: DISCONTINUED | OUTPATIENT
Start: 2023-04-06 | End: 2023-04-10 | Stop reason: HOSPADM

## 2023-04-06 RX ORDER — NALOXONE HCL 0.4 MG/ML
0.02 VIAL (ML) INJECTION
Status: DISCONTINUED | OUTPATIENT
Start: 2023-04-06 | End: 2023-04-10 | Stop reason: HOSPADM

## 2023-04-06 RX ORDER — ACETAMINOPHEN 325 MG/1
650 TABLET ORAL EVERY 8 HOURS PRN
Status: DISCONTINUED | OUTPATIENT
Start: 2023-04-06 | End: 2023-04-10 | Stop reason: HOSPADM

## 2023-04-06 RX ORDER — IBUPROFEN 200 MG
24 TABLET ORAL
Status: DISCONTINUED | OUTPATIENT
Start: 2023-04-06 | End: 2023-04-10 | Stop reason: HOSPADM

## 2023-04-06 RX ORDER — CLOPIDOGREL BISULFATE 75 MG/1
75 TABLET ORAL DAILY
Status: DISCONTINUED | OUTPATIENT
Start: 2023-04-06 | End: 2023-04-09

## 2023-04-06 RX ORDER — IPRATROPIUM BROMIDE AND ALBUTEROL SULFATE 2.5; .5 MG/3ML; MG/3ML
3 SOLUTION RESPIRATORY (INHALATION) EVERY 6 HOURS PRN
Status: DISCONTINUED | OUTPATIENT
Start: 2023-04-06 | End: 2023-04-07

## 2023-04-06 RX ORDER — POLYETHYLENE GLYCOL 3350 17 G/17G
17 POWDER, FOR SOLUTION ORAL 2 TIMES DAILY PRN
Status: DISCONTINUED | OUTPATIENT
Start: 2023-04-06 | End: 2023-04-10 | Stop reason: HOSPADM

## 2023-04-06 RX ADMIN — MUPIROCIN: 20 OINTMENT TOPICAL at 09:04

## 2023-04-06 RX ADMIN — LORAZEPAM 1.5 MG: 2 INJECTION INTRAMUSCULAR; INTRAVENOUS at 08:04

## 2023-04-06 RX ADMIN — METHYLPREDNISOLONE SODIUM SUCCINATE 40 MG: 40 INJECTION, POWDER, FOR SOLUTION INTRAMUSCULAR; INTRAVENOUS at 05:04

## 2023-04-06 RX ADMIN — VENLAFAXINE HYDROCHLORIDE 75 MG: 75 CAPSULE, EXTENDED RELEASE ORAL at 04:04

## 2023-04-06 RX ADMIN — SODIUM CHLORIDE 1000 ML: 9 INJECTION, SOLUTION INTRAVENOUS at 08:04

## 2023-04-06 RX ADMIN — PANTOPRAZOLE SODIUM 40 MG: 40 TABLET, DELAYED RELEASE ORAL at 04:04

## 2023-04-06 RX ADMIN — IPRATROPIUM BROMIDE AND ALBUTEROL SULFATE 3 ML: 2.5; .5 SOLUTION RESPIRATORY (INHALATION) at 05:04

## 2023-04-06 NOTE — ASSESSMENT & PLAN NOTE
74 y/o female with R MCA stroke that has extended and new infarcts Etiology cardioembolic    Antithrombotics: DAPT  Statin: Lipitor 40 mg daily  Therapy: PT/OT/ST/  Diagnostics: None  Risk factor modification: HTN, obesity, CAD, DM  VTE: Lovenox 40 mg sc daily, SCD's  SBP <180

## 2023-04-06 NOTE — ED PROVIDER NOTES
Encounter Date: 2023       History     Chief Complaint   Patient presents with    Aphasia     72 yo female tob triage for aphasia and left facial droop. Daughter says mom was recently discharged from here on yesterday around 5pm for stroke symptoms that had resolved. They went to bed around 9 pm, she had a really bad HA around 1am, and awakened this morning around 6-6:30 am w/ slurred speech and facial droop. Pt has steady gait, no confusion noted. Denies numbness/tingling, blurred vision, dizziness, and extremity weakness. Took her daily morning meds this morning, including blood thinner.    Facial Droop     72 yo female w/ Hx of stroke presenting with slurred speech, left facial droop. Daughter says mom was recently discharged from here on yesterday around 5pm for stroke symptoms that had resolved. Reports they went to bed around 9 pm and woke up with a severe headache around 1am. Reports going back to sleep before waking up this morning around 6-6:30 am w/ slurred speech and facial droop. Denies numbness/tingling, blurred vision, dizziness, and extremity weakness. Took her daily morning meds this morning, including blood thinner.    Reports symptoms had nearly resolved when she left here after her most recent stroke diagnosis and now have returned though less severe than before.    Review of patient's allergies indicates:   Allergen Reactions    Talwin [pentazocine lactate] Shortness Of Breath    Ace inhibitors Other (See Comments)     cough     Past Medical History:   Diagnosis Date    Anxiety     Depression     Heart attack     12 years ago    History of heart attack     Hypertension      Past Surgical History:   Procedure Laterality Date     SECTION      EYE SURGERY Bilateral     CATARACT     Family History   Problem Relation Age of Onset    Diabetes Mother     Heart disease Mother     Hypertension Mother     Diabetes Father     Heart disease Father     Diabetes Sister     Heart disease Sister       Social History     Tobacco Use    Smoking status: Every Day     Packs/day: 0.50     Types: Cigarettes     Passive exposure: Past    Smokeless tobacco: Never   Substance Use Topics    Alcohol use: Yes     Comment: seldom    Drug use: No     Review of Systems   Constitutional:  Negative for chills and fever.   HENT:  Negative for sore throat.    Respiratory:  Negative for shortness of breath.    Cardiovascular:  Negative for chest pain.   Gastrointestinal:  Negative for nausea and vomiting.   Genitourinary:  Negative for dysuria.   Musculoskeletal:  Negative for back pain.   Skin:  Negative for rash.   Neurological:  Negative for weakness.   Psychiatric/Behavioral:  Negative for confusion.      Physical Exam     Initial Vitals [04/06/23 0732]   BP Pulse Resp Temp SpO2   (!) 192/113 61 18 98.3 °F (36.8 °C) 95 %      MAP       --         Physical Exam    Nursing note and vitals reviewed.  Constitutional: She appears well-developed and well-nourished. She is not diaphoretic. No distress.   HENT:   Head: Normocephalic and atraumatic.   Nose: Nose normal.   Eyes: EOM are normal. Pupils are equal, round, and reactive to light. No scleral icterus.   Neck: Neck supple.   Normal range of motion.  Cardiovascular:  Normal rate, regular rhythm, normal heart sounds and intact distal pulses.     Exam reveals no gallop and no friction rub.       No murmur heard.  Pulmonary/Chest: Breath sounds normal. No stridor. No respiratory distress. She has no wheezes. She has no rhonchi. She has no rales.   Abdominal: Abdomen is soft. Bowel sounds are normal. She exhibits no distension. There is no abdominal tenderness. There is no rebound and no guarding.   Musculoskeletal:         General: No tenderness or edema. Normal range of motion.      Cervical back: Normal range of motion and neck supple.     Neurological: She is alert and oriented to person, place, and time. No cranial nerve deficit. GCS score is 15. GCS eye subscore is 4. GCS  verbal subscore is 5. GCS motor subscore is 6.   Skin: Skin is warm and dry. No rash noted.   Psychiatric: She has a normal mood and affect. Her behavior is normal.       ED Course   Critical Care    Date/Time: 4/6/2023 3:43 PM  Performed by: Ronal Llanes MD  Authorized by: Alfredo Blas MD   Direct patient critical care time: 30 minutes  Total critical care time (exclusive of procedural time) : 30 minutes  Critical care was necessary to treat or prevent imminent or life-threatening deterioration of the following conditions: CNS failure or compromise.      Labs Reviewed   CBC W/ AUTO DIFFERENTIAL - Abnormal; Notable for the following components:       Result Value    WBC 14.26 (*)     Immature Granulocytes 0.6 (*)     Gran # (ANC) 9.9 (*)     Immature Grans (Abs) 0.09 (*)     Mono # 1.3 (*)     All other components within normal limits   COMPREHENSIVE METABOLIC PANEL - Abnormal; Notable for the following components:    Chloride 111 (*)     CO2 18 (*)     Glucose 156 (*)     All other components within normal limits   LIPID PANEL - Abnormal; Notable for the following components:    LDL Cholesterol 55.0 (*)     All other components within normal limits   TROPONIN I - Abnormal; Notable for the following components:    Troponin I 0.614 (*)     All other components within normal limits   POCT GLUCOSE - Abnormal; Notable for the following components:    POCT Glucose 136 (*)     All other components within normal limits   ISTAT PROCEDURE - Abnormal; Notable for the following components:    POC Glucose 162 (*)     POC Anion Gap 18 (*)     All other components within normal limits   PROTIME-INR   TSH   POCT GLUCOSE, HAND-HELD DEVICE   ISTAT CHEM8   POCT PROTIME-INR     EKG Readings: (Independently Interpreted)   Initial Reading: No STEMI. Rhythm: Sinus Bradycardia. Heart Rate: 57.   No ST segment elevation or depression concerning for acute ischemia.        Imaging Results               MRI Brain Without Contrast  (Final result)  Result time 04/06/23 10:35:17      Final result by Roe Stephens MD (04/06/23 10:35:17)                   Impression:      1. Since prior MRI of the brain performed 04/03/2023, there is new restricted diffusion and FLAIR signal abnormality in the right frontal operculum compatible with acute to early subacute ischemia.  These findings are in close proximity to previously identified additional evolving early subacute infarcts elsewhere in the frontal lobe, insula, and frontal parietal white matter.  2. Additionally, there is a questionable new punctate area of restricted diffusion the right inferior jugular white matter at the level of the atrium of the lateral ventricle.  3. Questionable increased FLAIR signal within the M2 branches of the right MCA within the sylvian fissure.  Finding nonspecific, potentially artifactual, though slow and/or disorganized flow related to proximal steno-occlusive disease is additionally considered.  CTA or MRA could be performed for further characterization as warranted.  This report was flagged in Epic as abnormal.      Electronically signed by: Roe Stephens  Date:    04/06/2023  Time:    10:35               Narrative:    EXAMINATION:  MRI BRAIN WITHOUT CONTRAST    CLINICAL HISTORY:  Stroke, follow up;    TECHNIQUE:  Multiplanar multisequence MR imaging of the brain was performed without contrast.    COMPARISON:  MRI brain 04/03/2023.  CT head, 04/06/2023.    FINDINGS:  Parenchyma: Since prior MRI of the brain performed 04/03/2023, there is new restricted diffusion and FLAIR signal abnormality in the right frontal operculum compatible with acute to early subacute ischemia.  These findings are in close proximity to previously identified additional evolving early subacute infarcts elsewhere in the frontal lobe, insula, and frontal parietal white matter.    Additionally, there is a questionable new punctate area of restricted diffusion the right inferior jugular  white matter at the level of the atrium of the lateral ventricle (axial DWI series 3, image 15).No definite associated hemorrhage or mass effect, noting limitations imposed by motion.    Elsewhere, generalized pattern age-related parenchymal volume loss.  Nonspecific areas of T2/FLAIR hyperintense signal in the frontoparietal white matter would be in keeping with sequela of chronic small vessel ischemic disease.    Additional comments: There is no midline shift, abnormal extra-axial fluid collection, or acute intracranial hemorrhage. The basal cisterns are patent.    Ventricles: Normal.    Flow voids: Questionable increased FLAIR signal within the M2 branches of the right MCA within the sylvian fissure (axial series 8, image 12).  The normal major intracranial arterial flow voids are otherwise grossly visualized.    Sinuses and mastoid air cells: Essentially clear    Orbits: Status post bilateral lens replacements.    Midline structures: The pituitary and craniocervical junction are normal.    Marrow: Normal                                         MRA Brain without contrast (No Result on File)  Result time 04/06/23 09:49:24                     CT Head Without Contrast (Final result)  Result time 04/06/23 07:53:49      Final result by Roe Stephens MD (04/06/23 07:53:49)                   Impression:      1. Continued maturing right frontal opercular and insular infarcts in keeping with findings on prior MR imaging of 04/03/2023. Vague areas of hypoattenuation involving the right suprarenal white matter would be in keeping with additional areas of the evolving recent ischemia seen on the 04/03/2023 MRI. Small foci of cortical ischemia elsewhere likely beyond imaging resolution of current CT technique. No definite new areas of macroscopic hemorrhage within the infarct territories. No additional new infarcts additional vascular territories by noncontrast CT.  2. Equivocal mildly asymmetrically increased  attenuation within the region of the right M1 MCA.  Repeat CTA could be considered to exclude the possibility of thrombus in light of reportedly new neurologic symptoms.  Clinical correlation recommended in this regard.      Electronically signed by: Roe Stephens  Date:    04/06/2023  Time:    07:53               Narrative:    EXAMINATION:  CT HEAD WITHOUT CONTRAST    CLINICAL HISTORY:  Neuro deficit, acute, stroke suspected;    TECHNIQUE:  Low dose axial images were obtained through the head.  Coronal and sagittal reformations were also performed. Contrast was not administered.    COMPARISON:  CT head 04/04/2023.    FINDINGS:  Blood: No acute intracranial hemorrhage.    Parenchyma: No definite loss of gray-white differentiation to suggest acute or subacute transcortical infarct.  Continued maturing right frontal opercular and insular infarcts in keeping with findings on prior MR imaging of 04/03/2023.  Vague areas of hypoattenuation involving the right suprarenal white matter would be in keeping with additional areas of the evolving recent ischemia seen on the 04/03/2023 MRI.  Small foci of cortical ischemia elsewhere likely beyond imaging resolution of current CT technique.    No definite new areas of macroscopic hemorrhage within the infarct territories.  No additional new infarcts additional vascular territories by noncontrast CT.    Ventricles/Extra-axial spaces: No abnormal extra-axial fluid collection. Basal cisterns are patent.    Vessels: Equivocal mildly asymmetrically increased attenuation within the region of the right M1 MCA (axial series 2, image 11).    Orbits: Status post bilateral lens replacements.    Scalp: Unremarkable.    Skull: There are no depressed skull fractures or destructive bone lesions.    Sinuses and mastoids: Essentially clear.    Other findings: None                                       Medications   melatonin tablet 6 mg (has no administration in time range)   polyethylene glycol  packet 17 g (has no administration in time range)   acetaminophen tablet 650 mg (has no administration in time range)   naloxone 0.4 mg/mL injection 0.02 mg (has no administration in time range)   glucose chewable tablet 16 g (has no administration in time range)   glucose chewable tablet 24 g (has no administration in time range)   dextrose 50% injection 12.5 g (has no administration in time range)   dextrose 50% injection 25 g (has no administration in time range)   glucagon (human recombinant) injection 1 mg (has no administration in time range)   atorvastatin tablet 80 mg (80 mg Oral Not Given 4/6/23 1145)   clopidogreL tablet 75 mg (75 mg Oral Not Given 4/6/23 1145)   aspirin EC tablet 81 mg (81 mg Oral Not Given 4/6/23 1145)   venlafaxine 24 hr capsule 75 mg (has no administration in time range)   pantoprazole EC tablet 40 mg (has no administration in time range)   sodium chloride 0.9% bolus 1,000 mL 1,000 mL (0 mLs Intravenous Stopped 4/6/23 0913)   LORazepam injection 1.5 mg (1.5 mg Intravenous Given 4/6/23 0840)     Medical Decision Making:   Initial Assessment:   73-year-old female with recently confirmed stroke.  Symptoms essentially resolved at discharge, now returning.  Tele stroke initiated.  Concern for possible recurrence and or evolving or clotted collaterals.  MRI MRA ordered and requested by tele stroke.  No acute intervention at this time.  Continue dual antiplatelet therapy.  Patient to be admitted to Hospital Medicine for further evaluation and treatment of evolving stroke.  Permissive hypertension recommended.                        Clinical Impression:   Final diagnoses:  [I63.9] Stroke        ED Disposition Condition    Admit Stable                Ronal Llanes MD  04/06/23 5155

## 2023-04-06 NOTE — CONSULTS
[facility]  Vascular Neurology  Comprehensive Stroke Center  TeleVascular Neurology Acute Consultation Note      Consult to Telemedicine - Acute Stroke  Consult performed by: Maria Elena Mir MD  Consult ordered by: Ronal John MD        Consulting Provider: RONAL JOHN.  Current Providers  No providers found    Patient Location:  [facility] EMERGENCY DEPARTMENT Emergency Department  Spoke hospital nurse at bedside with patient assisting consultant.     Patient information was obtained from patient and ED team .         Assessment/Plan:       Diagnoses:   Neuro  Stroke due to occlusion of right middle cerebral artery  73F with history of HTN, CAD, recent right MCA territory stroke 2/2 right M2 occlusion on 04/03/2023 presenting with recurrent left-sided weakness, dysarthria, and left facial droop. NIHSS 6.    NCCTH shows evolution of prior infarctions. Hyperdense right MCA compared to the left MCA.     She is out of the therapeutic window for IV thrombolytics (additional contraindications include recent stroke). Given her clinical presentation and areas of infarct on CT, there does not appear to be a clinical-imaging mismatch. As such, she is not a candidate for EVT at this time.     Recommend STAT MRI brain and MRA head without contrast to evaluate infarct burden as well as to evaluate for new occlusion.     Recommend IVF and permissive HTN, SBP < 220 while undergoing these studies. Please contact us for any exam changes as she may require higher level/close neurologic monitoring in an ICU setting.      Antithrombotics for secondary stroke prevention: Continue ASA 81mg and plavix 75mg.     Statins for secondary stroke prevention and hyperlipidemia, if present: Recommend initiation or continuation of a high-intensity statin for goal LDL < 70mg/dL.    Aggressive risk factor modification: HTN, HLD, CAD, recent stroke     Rehab efforts: The patient has been evaluated by a stroke  team provider and the therapy needs have been fully considered based off the presenting complaints and exam findings. The following therapy evaluations are needed: PT evaluate and treat, OT evaluate and treat, SLP evaluate and treat, PM&R evaluate for appropriate placement    Diagnostics recommended:   - MRI/MRA brain without contrast   - Monitor on telemetry while admitted  - Labs: hemoglobin A1c (goal <7%), lipid panel (LDL goal <70mg/dL), TSH  - Treat hyperglycemia to achieve a blood glucose level in a range of 140-180 mg/dL and to closely monitor to prevent hypoglycemia (Class IIa, Level C-LD).    VTE prophylaxis: Enoxaparin 40 mg SQ every 24 hours  Mechanical prophylaxis: Place SCDs    BP parameters: Infarct: No intervention, SBP <220    - Patient may be at risk for worsening deficits with positional changes or drops in blood pressure  (pressure-dependent state), or if there is extension of stroke or development of cerebral edema;  as such, recommend close neurological monitoring.  - Recommend permissive hypertension for the initial 24 to 48 hours of acute ischemic stroke unless the blood pressure is >220 mm Hg systolic or >120 mm Hg diastolic, or there is a concomitant specific medical condition that would benefit from blood pressure lowering (e.g., MI, aortic dissection).  - In the setting of possible hypovolemia, initiate IV fluids to maintain adequate hydration and euvolemia (although be cautious in patients at risk of fluid overload, such as those with renal or heart failure).  - The optimal time after the onset of acute ischemic stroke to restart or start long-term antihypertensive therapy has not been established and may depend on various patient and stroke characteristics (e.g., pressure dependent state), but in most patients it is reasonable to initiate long-term antihypertensive therapy after the initial 48 to 72 hours from stroke onset.    Please contact us with any further questions or concerns or  if patient has any acute neurological changes (new symptoms, worsening deficits).            STROKE DOCUMENTATION     Acute Stroke Times:   Acute Stroke Times   Last Known Normal Date: 04/05/23  Last Known Normal Time: 2100  Symptom Onset Date: 04/05/23  Symptom Onset Time: 0630  Stroke Team Called Date: 04/06/23  Stroke Team Called Time: 0739  Stroke Team Arrival Date: 04/06/23  Stroke Team Arrival Time: 0742  CT Interpretation Time: 0745  Thrombolytic Therapy Recommended: No  Thrombectomy Recommended: No    NIH Scale:  Interval: baseline  1a. Level of Consciousness: 0-->Alert, keenly responsive  1b. LOC Questions: 0-->Answers both questions correctly  1c. LOC Commands: 0-->Performs both tasks correctly  2. Best Gaze: 0-->Normal  3. Visual: 0-->No visual loss  4. Facial Palsy: 2-->Partial paralysis (total or near-total paralysis of lower face)  5a. Motor Arm, Left: 1-->Drift, limb holds 90 (or 45) degrees, but drifts down before full 10 seconds, does not hit bed or other support  5b. Motor Arm, Right: 0-->No drift, limb holds 90 (or 45) degrees for full 10 secs  6a. Motor Leg, Left: 1-->Drift, leg falls by the end of the 5-sec period but does not hit bed  6b. Motor Leg, Right: 0-->No drift, leg holds 30 degree position for full 5 secs  7. Limb Ataxia: 0-->Absent  8. Sensory: 0-->Normal, no sensory loss  9. Best Language: 0-->No aphasia, normal  10. Dysarthria: 1-->Mild-to-moderate dysarthria, patient slurs at least some words and, at worst, can be understood with some difficulty  11. Extinction and Inattention (formerly Neglect): 1-->Visual, tactile, auditory, spatial, or personal inattention or extinction to bilateral simultaneous stimulation in one of the sensory modalities  Total (NIH Stroke Scale): 6     Modified Armstrong Score: 0  Kali Coma Scale:    ABCD2 Score:    [id number] Score:   HAS -BLED Score:   ICH Score:   Hunt & Stokes Classification:       Blood pressure (!) 192/113, pulse (!) 58, temperature  "98.3 °F (36.8 °C), temperature source Oral, resp. rate (!) 38, height 5' 6" (1.676 m), weight 101.2 kg (223 lb), SpO2 98 %.  Eligible for thrombolytic therapy?: No  Thrombolytic therapy recomended: Thrombolytic therapy not recommended due to Outside of treatment window , Hypodensity on CT , and Recent previous stroke   Possible Interventional Revascularization Candidate? No; No ischemic penumbra    Disposition Recommendation: pending further studies    Subjective:     History of Present Illness:  73F with history of HTN, CAD, recent right MCA territory stroke 2/2 right M2 occlusion on 04/03/2023 presenting with recurrent left-sided weakness, dysarthria, and left facial droop.     Initial NIHSS 1 on 04/03. She was not a candidate for IVT/EVT. Her symptoms had fully resolved at discharge 04/05. On ASA, plavix, and statin. LKN 2100. She woke up this morning around 0630 with left facial weakness and dysarthria. She also has left-sided weakness. No sensory symptoms.    /90          Woke up with symptoms?: yes    Recent bleeding noted: yes     Does the patient take any Blood Thinners? no     Medications: Antiplatelets:  aspirin and clopidogrel/Plavix    Past Medical History: hypertension, hyperlipidemia, MI/CAD and stroke    Past Surgical History: no relevant surgical history    Family History: no relevant history    Social History: no smoking, no drinking, no drugs    Allergies: Talwin [Pentazocine Lactate]  Ace Inhibitors     Review of Systems   The following systems were reviewed with pertinent positives and negatives documented in the HPI: Constitutional, Eyes, CV, Respiratory, GI, , Musculoskeletal, Skin, Neurological, Psychiatric      Objective:   Vitals: Blood pressure (!) 192/113, pulse (!) 58, temperature 98.3 °F (36.8 °C), temperature source Oral, resp. rate (!) 38, height 5' 6" (1.676 m), weight 101.2 kg (223 lb), SpO2 98 %.     CT READ: Yes  Abnormal CT Evolving infarctions in the right MCA territory. "     Physical Exam  Vitals reviewed.   Constitutional:       Appearance: Normal appearance.   HENT:      Head: Normocephalic and atraumatic.   Eyes:      General: Lids are normal.      Extraocular Movements: Extraocular movements intact.   Cardiovascular:      Rate and Rhythm: Normal rate and regular rhythm.   Pulmonary:      Effort: Pulmonary effort is normal. No respiratory distress.   Abdominal:      General: Abdomen is flat. There is no distension.   Musculoskeletal:         General: No deformity or signs of injury. Normal range of motion.      Cervical back: Normal range of motion. No rigidity.   Neurological:      Mental Status: She is alert and oriented to person, place, and time.      Sensory: No sensory deficit.      Motor: Weakness present.      Comments: Left facial weakness, moderate dysarthria, LUE drift, LLE drift, no sensory symptoms   Psychiatric:         Mood and Affect: Mood normal.         Behavior: Behavior normal.             Recommended the emergency room physician to have a brief discussion with the patient and/or family if available regarding the  risks and benefits of treatment, and to briefly document the occurrence of that discussion in his clinical encounter note.     The attending portion of this evaluation, treatment, and documentation was performed per Maria Elena Mir MD via audiovisual.    Billing code:  (moderate to severe stroke, large areas of edema, some mimics)      This patient has a critical neurological condition/illness, with high morbidity and mortality.  There is a high probability for acute neurological change leading to clinical and possibly life-threatening deterioration requiring highest level of physician preparedness for urgent intervention.  Care was coordinated with other physicians involved in the patient's care.  Radiologic studies and laboratory data were reviewed and interpreted, and plan of care was re-assessed based on the results.  Diagnosis, treatment options  and prognosis may have been discussed with the patient and/or family members or caregiver.  Further advanced medical management and further evaluation is warranted for his care.    In your opinion, this was a: Tier 2 Van Positive    Consult End Time: 8:52 AM     Maria Elena Mir MD, PhD  Comprehensive Stroke Center  Vascular Neurology   [facility]

## 2023-04-06 NOTE — PT/OT/SLP PROGRESS
Speech Language Pathology      Ivet Juan A Crabtree  MRN: [id number]    Patient not seen today secondary to pt with dcr alertness and dcr safety with po intake at this time. ST recs cont NPO. ST will follow-up 4/7.    Anamika Blank, KAELA-SLP

## 2023-04-06 NOTE — HPI
Ms. Luque is a 73-year-old female who was admitted to the hospital with slurred speech and left facial droop.  Initial CT unrevealing however MRI of the brain revealed an acute infarction in the area of right subinsular cortex and right centrum semiovale.  Also noted old lacunar infarcts in the left dennis.  Symptoms still present with increased left pronator drift.  Consult to neurology was placed.  CTA was ordered and noted evolution of stroke with no evidence of hemorrhage or shifting.  Telemetry reviewed throughout stay and noted to be in normal sinus rhythm without any evidence of atrial fibrillation or atrial flutter.  After reviewing imaging, neurology felt this was cardioembolic in nature and Cardiology was consulted.  Echo with bubble study did not reveal any evidence of shunt.  Recommendations including outpatient Holter monitor versus implanted loop recorder.  She remained on aspirin and Plavix and statin throughout the hospitalization.  On day of discharge 04/06, most of patient's symptoms had resolved and she was feeling well.  I had a long discussion with patient and her daughter about medications, no driving and referral placed a PMR.  We also discussed following up with her cardiologist for continued evaluation of possible arrhythmias.  They both expressed understanding and prescriptions were sent for patient.  Patient discharged home in stable condition.     All symptoms had abated by time of discharge, patient went home and ate a big dinner and felt well. Awoke the next morning around 1 a.m. with a really bad headache, went back to sleep awoke again at 6:00 a.m. with slurred speech and a facial droop.  No confusion this time and steady gait, all limbs working appropriately.    She presented to the ED again at [facility] for dysarthria, facial droop and L UE weakness . Vascular neurology consulted in the ED.  Repeat MRI showed new strokes.MRI showed new signals in right frontal operculum  compatible with acute to early subacute ischemia, these findings are in close proximity to previously identified additional evolving early subacute infarcts elsewhere in the frontal lobe, insula, and frontal parietal white matter. Additionally, there is a questionable new punctate area of restricted diffusion the right inferior jugular white matter at the level of the atrium of the lateral ventricle. And Questionable increased FLAIR signal within the M2 branches of the right MCA within the sylvian fissure. She did take her ASA, Plavix and Statin were taken this am.   Patient was admitted to ICU due to drowsiness due to benzo's given for imaging.     Patient was transferred to [facility] for further evaluation.   She is going to need a 30 day event monitor on discharge.   Upon examination patient has drift to LUE and facial droop with dysarthria has NIHSS 3

## 2023-04-06 NOTE — PROGRESS NOTES
CHW - Initial Contact    This Community Health Worker completed the Social Determinant of Health questionnaire with patient  and daughter at bedside  today.    Pt identified barriers of most importance are: pt is currently admitted in the hospital and has no needs at this time.  Referrals to community agencies completed with patient/caregiver consent outside of [facility] include: none at this time.  Referrals were put through [facility] - no: none at this time.  Support and Services: has support with daughter.  Other information discussed the patient needs / wants help with: Completed SDOH with patient and daughter at bedside, pt is currently admitted in the hospital and has no needs at this time, will follow up in one week for possible case closure.   Follow up required: yes.  Follow-up Outreach - Due: 4/12/2023

## 2023-04-06 NOTE — H&P
[facility] Medicine  History & Physical    Patient Name: Ivet Crabtree  MRN: [id number]  Patient Class: IP- Inpatient  Admission Date: 4/6/2023  Attending Physician: Alfredo Blas MD   Primary Care Provider: Zoie Garcia MD         Patient information was obtained from patient, past medical records and ER records.     Subjective:     Principal Problem:Stroke due to occlusion of right middle cerebral artery    Chief Complaint:   Chief Complaint   Patient presents with    Aphasia    Facial Droop        HPI:   Ivet Crabtree is a 73 y.o. female who has a past medical history of Anxiety, Depression, Heart attack, History of heart attack, stroke, and Hypertension, bounced back to the ED the next day after discharge with aphasia and left facial droop and found to have new strokes.    Recent hospital course: Ms. Luque is a 73-year-old female who was admitted to the hospital with slurred speech and left facial droop.  Initial CT unrevealing however MRI of the brain revealed an acute infarction in the area of right subinsular cortex and right centrum semiovale.  Also noted old lacunar infarcts in the left dennis.  Symptoms still present with increased left pronator drift.  Consult to neurology was placed.  CTA was ordered and noted evolution of stroke with no evidence of hemorrhage or shifting.  Telemetry reviewed throughout stay and noted to be in normal sinus rhythm without any evidence of atrial fibrillation or atrial flutter.  After reviewing imaging, neurology felt this was cardioembolic in nature and Cardiology was consulted.  Echo with bubble study did not reveal any evidence of shunt.  Recommendations including outpatient Holter monitor versus implanted loop recorder.  She remained on aspirin and Plavix and statin throughout the hospitalization.  On day of discharge 04/06, most of patient's symptoms had resolved and she was feeling well.  I had a long discussion with patient and  her daughter about medications, no driving and referral placed a PMR.  We also discussed following up with her cardiologist for continued evaluation of possible arrhythmias.  They both expressed understanding and prescriptions were sent for patient.  Patient discharged home in stable condition.    All symptoms had abated by time of discharge, patient went home and ate a big dinner and felt well. Awoke the next morning around 1 a.m. with a really bad headache, went back to sleep awoke again at 6:00 a.m. with slurred speech and a facial droop.  No confusion this time and steady gait, all limbs working appropriately.  Vascular neurology consulted in the ED.  Repeat MRI showed new strokes.MRI showed new signals in right frontal operculum compatible with acute to early subacute ischemia, these findings are in close proximity to previously identified additional evolving early subacute infarcts elsewhere in the frontal lobe, insula, and frontal parietal white matter. Additionally, there is a questionable new punctate area of restricted diffusion the right inferior jugular white matter at the level of the atrium of the lateral ventricle. And Questionable increased FLAIR signal within the M2 branches of the right MCA within the sylvian fissure. She did take her ASA, Plavix and Statin were taken this am.       Past Medical History:   Diagnosis Date    Anxiety     Depression     Heart attack     12 years ago    History of heart attack     Hypertension        Past Surgical History:   Procedure Laterality Date     SECTION      EYE SURGERY Bilateral     CATARACT       Review of patient's allergies indicates:   Allergen Reactions    Talwin [pentazocine lactate] Shortness Of Breath    Ace inhibitors Other (See Comments)     cough       Current Facility-Administered Medications on File Prior to Encounter   Medication    [DISCONTINUED] acetaminophen tablet 500 mg    [DISCONTINUED] albuterol-ipratropium 2.5  mg-0.5 mg/3 mL nebulizer solution 3 mL    [DISCONTINUED] aspirin EC tablet 81 mg    [DISCONTINUED] atorvastatin tablet 80 mg    [DISCONTINUED] benzonatate capsule 100 mg    [DISCONTINUED] clopidogreL tablet 75 mg    [DISCONTINUED] guaiFENesin 12 hr tablet 600 mg    [DISCONTINUED] melatonin tablet 6 mg    [DISCONTINUED] predniSONE tablet 40 mg    [DISCONTINUED] senna-docusate 8.6-50 mg per tablet 1 tablet    [DISCONTINUED] sodium chloride 0.9% flush 10 mL     Current Outpatient Medications on File Prior to Encounter   Medication Sig    albuterol (VENTOLIN HFA) 90 mcg/actuation inhaler Inhale 2 puffs into the lungs every 4 (four) hours as needed for Wheezing or Shortness of Breath. Rescue (Patient taking differently: Inhale 2 puffs into the lungs every 6 (six) hours as needed for Wheezing or Shortness of Breath. Rescue)    aspirin (ECOTRIN) 325 MG EC tablet Take 325 mg by mouth once daily.    atorvastatin (LIPITOR) 40 MG tablet Take 2 tablets (80 mg total) by mouth once daily.    clopidogreL (PLAVIX) 75 mg tablet Take 1 tablet (75 mg total) by mouth once daily.    EPINEPHrine (EPIPEN JR) 0.15 mg/0.3 mL pen injection Inject 0.15 mg into the muscle.    fluticasone propionate (FLONASE) 50 mcg/actuation nasal spray 1 spray by Each Nostril route once daily.    hydroCHLOROthiazide (HYDRODIURIL) 25 MG tablet Take 1 tablet (25 mg total) by mouth once daily. Restart on 4/6/23    metoprolol tartrate (LOPRESSOR) 50 MG tablet Take 1 tablet (50 mg total) by mouth 2 (two) times daily. Restart on 4/6/23    nitroGLYCERIN (NITROSTAT) 0.4 MG SL tablet Place 0.4 mg under the tongue.    pantoprazole (PROTONIX) 40 MG tablet Take 40 mg by mouth Daily.    potassium chloride (KLOR-CON) 10 MEQ TbSR Take 10 mEq by mouth Daily.    predniSONE (DELTASONE) 20 MG tablet Take 2 tablets (40 mg total) by mouth once daily.    venlafaxine (EFFEXOR-XR) 75 MG 24 hr capsule Take 75 mg by mouth Daily. Take the 75 mg tab po qd with the  150 mg tab po qd.  Total dose 225 mg po qd.     Family History       Problem Relation (Age of Onset)    Diabetes Mother, Father, Sister    Heart disease Mother, Father, Sister    Hypertension Mother          Tobacco Use    Smoking status: Every Day     Packs/day: 0.50     Types: Cigarettes     Passive exposure: Past    Smokeless tobacco: Never   Substance and Sexual Activity    Alcohol use: Yes     Comment: seldom    Drug use: No    Sexual activity: Not Currently     Review of Systems   Unable to perform ROS: Mental status change   Patient snowed from [facility], unable to participate in ROS or Exam      Objective:     Vital Signs (Most Recent):  Temp: 97.8 °F (36.6 °C) (04/06/23 1045)  Pulse: (!) 58 (04/06/23 1354)  Resp: (!) 23 (04/06/23 1354)  BP: (!) 188/97 (04/06/23 1354)  SpO2: 98 % (04/06/23 1354)   Vital Signs (24h Range):  Temp:  [97.7 °F (36.5 °C)-98.3 °F (36.8 °C)] 97.8 °F (36.6 °C)  Pulse:  [52-71] 58  Resp:  [18-38] 23  SpO2:  [94 %-99 %] 98 %  BP: (113-230)/() 188/97     Weight: 101.2 kg (223 lb)  Body mass index is 35.99 kg/m².    Physical Exam  Vitals and nursing note reviewed.   Constitutional:       General: She is not in acute distress.     Appearance: She is well-developed. She is obese. She is not diaphoretic.   HENT:      Head: Normocephalic and atraumatic.   Eyes:      General: No scleral icterus.     Pupils: Pupils are equal, round, and reactive to light.   Neck:      Thyroid: No thyromegaly.   Cardiovascular:      Rate and Rhythm: Normal rate and regular rhythm.      Heart sounds: No murmur heard.  Pulmonary:      Effort: Pulmonary effort is normal.      Breath sounds: Normal breath sounds. No stridor. No wheezing or rales.   Abdominal:      General: There is no distension.      Palpations: Abdomen is soft. There is no mass.      Tenderness: There is no abdominal tenderness. There is no guarding.   Musculoskeletal:         General: No swelling or deformity. Normal range of motion.       Cervical back: Normal range of motion and neck supple.   Skin:     General: Skin is warm and dry.      Capillary Refill: Capillary refill takes less than 2 seconds.      Coloration: Skin is not jaundiced.      Findings: No bruising.   Neurological:      Mental Status: Mental status is at baseline.      Cranial Nerves: Cranial nerve deficit and facial asymmetry present.         CRANIAL NERVES     CN III, IV, VI   Pupils are equal, round, and reactive to light.         Recent Results (from the past 24 hour(s))   POCT glucose    Collection Time: 04/06/23  7:22 AM   Result Value Ref Range    POCT Glucose 136 (H) 70 - 110 mg/dL   CBC W/ AUTO DIFFERENTIAL    Collection Time: 04/06/23  8:04 AM   Result Value Ref Range    WBC 14.26 (H) 3.90 - 12.70 K/uL    RBC 5.16 4.00 - 5.40 M/uL    Hemoglobin 15.3 12.0 - 16.0 g/dL    Hematocrit 46.4 37.0 - 48.5 %    MCV 90 82 - 98 fL    MCH 29.7 27.0 - 31.0 pg    MCHC 33.0 32.0 - 36.0 g/dL    RDW 14.4 11.5 - 14.5 %    Platelets 361 150 - 450 K/uL    MPV 10.5 9.2 - 12.9 fL    Immature Granulocytes 0.6 (H) 0.0 - 0.5 %    Gran # (ANC) 9.9 (H) 1.8 - 7.7 K/uL    Immature Grans (Abs) 0.09 (H) 0.00 - 0.04 K/uL    Lymph # 2.8 1.0 - 4.8 K/uL    Mono # 1.3 (H) 0.3 - 1.0 K/uL    Eos # 0.1 0.0 - 0.5 K/uL    Baso # 0.08 0.00 - 0.20 K/uL    nRBC 0 0 /100 WBC    Gran % 69.2 38.0 - 73.0 %    Lymph % 19.9 18.0 - 48.0 %    Mono % 9.1 4.0 - 15.0 %    Eosinophil % 0.6 0.0 - 8.0 %    Basophil % 0.6 0.0 - 1.9 %    Differential Method Automated    Comprehensive metabolic panel    Collection Time: 04/06/23  8:04 AM   Result Value Ref Range    Sodium 140 136 - 145 mmol/L    Potassium 3.9 3.5 - 5.1 mmol/L    Chloride 111 (H) 95 - 110 mmol/L    CO2 18 (L) 23 - 29 mmol/L    Glucose 156 (H) 70 - 110 mg/dL    BUN 20 8 - 23 mg/dL    Creatinine 0.8 0.5 - 1.4 mg/dL    Calcium 8.8 8.7 - 10.5 mg/dL    Total Protein 7.3 6.0 - 8.4 g/dL    Albumin 3.7 3.5 - 5.2 g/dL    Total Bilirubin 0.4 0.1 - 1.0 mg/dL    Alkaline  Phosphatase 118 55 - 135 U/L    AST 18 10 - 40 U/L    ALT 25 10 - 44 U/L    Anion Gap 11 8 - 16 mmol/L    eGFR >60 >60 mL/min/1.73 m^2   Protime-INR    Collection Time: 04/06/23  8:04 AM   Result Value Ref Range    Prothrombin Time 11.4 9.0 - 12.5 sec    INR 1.1 0.8 - 1.2   TSH    Collection Time: 04/06/23  8:04 AM   Result Value Ref Range    TSH 3.775 0.400 - 4.000 uIU/mL   LDL - Lipid Panel    Collection Time: 04/06/23  8:04 AM   Result Value Ref Range    Cholesterol 130 120 - 199 mg/dL    Triglycerides 120 30 - 150 mg/dL    HDL 51 40 - 75 mg/dL    LDL Cholesterol 55.0 (L) 63.0 - 159.0 mg/dL    HDL/Cholesterol Ratio 39.2 20.0 - 50.0 %    Total Cholesterol/HDL Ratio 2.5 2.0 - 5.0    Non-HDL Cholesterol 79 mg/dL   Troponin I    Collection Time: 04/06/23  8:04 AM   Result Value Ref Range    Troponin I 0.614 (H) 0.000 - 0.026 ng/mL   ISTAT PROCEDURE    Collection Time: 04/06/23  8:10 AM   Result Value Ref Range    POC Glucose 162 (H) 70 - 110 mg/dL    POC BUN 22 6 - 30 mg/dL    POC Creatinine 0.7 0.5 - 1.4 mg/dL    POC Sodium 143 136 - 145 mmol/L    POC Potassium 3.8 3.5 - 5.1 mmol/L    POC Chloride 107 95 - 110 mmol/L    POC TCO2 (MEASURED) 23 23 - 29 mmol/L    POC Anion Gap 18 (H) 8 - 16 mmol/L    POC Ionized Calcium 1.22 1.06 - 1.42 mmol/L    POC Hematocrit 47 36 - 54 %PCV    Sample VENOUS        Microbiology Results (last 7 days)       ** No results found for the last 168 hours. **             Imaging Results               MRI Brain Without Contrast (Final result)  Result time 04/06/23 10:35:17      Final result by Roe Stephens MD (04/06/23 10:35:17)                   Impression:      1. Since prior MRI of the brain performed 04/03/2023, there is new restricted diffusion and FLAIR signal abnormality in the right frontal operculum compatible with acute to early subacute ischemia.  These findings are in close proximity to previously identified additional evolving early subacute infarcts elsewhere in the  frontal lobe, insula, and frontal parietal white matter.  2. Additionally, there is a questionable new punctate area of restricted diffusion the right inferior jugular white matter at the level of the atrium of the lateral ventricle.  3. Questionable increased FLAIR signal within the M2 branches of the right MCA within the sylvian fissure.  Finding nonspecific, potentially artifactual, though slow and/or disorganized flow related to proximal steno-occlusive disease is additionally considered.  CTA or MRA could be performed for further characterization as warranted.  This report was flagged in Epic as abnormal.      Electronically signed by: Roe Stephens  Date:    04/06/2023  Time:    10:35               Narrative:    EXAMINATION:  MRI BRAIN WITHOUT CONTRAST    CLINICAL HISTORY:  Stroke, follow up;    TECHNIQUE:  Multiplanar multisequence MR imaging of the brain was performed without contrast.    COMPARISON:  MRI brain 04/03/2023.  CT head, 04/06/2023.    FINDINGS:  Parenchyma: Since prior MRI of the brain performed 04/03/2023, there is new restricted diffusion and FLAIR signal abnormality in the right frontal operculum compatible with acute to early subacute ischemia.  These findings are in close proximity to previously identified additional evolving early subacute infarcts elsewhere in the frontal lobe, insula, and frontal parietal white matter.    Additionally, there is a questionable new punctate area of restricted diffusion the right inferior jugular white matter at the level of the atrium of the lateral ventricle (axial DWI series 3, image 15).No definite associated hemorrhage or mass effect, noting limitations imposed by motion.    Elsewhere, generalized pattern age-related parenchymal volume loss.  Nonspecific areas of T2/FLAIR hyperintense signal in the frontoparietal white matter would be in keeping with sequela of chronic small vessel ischemic disease.    Additional comments: There is no midline shift,  abnormal extra-axial fluid collection, or acute intracranial hemorrhage. The basal cisterns are patent.    Ventricles: Normal.    Flow voids: Questionable increased FLAIR signal within the M2 branches of the right MCA within the sylvian fissure (axial series 8, image 12).  The normal major intracranial arterial flow voids are otherwise grossly visualized.    Sinuses and mastoid air cells: Essentially clear    Orbits: Status post bilateral lens replacements.    Midline structures: The pituitary and craniocervical junction are normal.    Marrow: Normal                                         MRA Brain without contrast (No Result on File)  Result time 04/06/23 09:49:24                     CT Head Without Contrast (Final result)  Result time 04/06/23 07:53:49      Final result by Roe Stephens MD (04/06/23 07:53:49)                   Impression:      1. Continued maturing right frontal opercular and insular infarcts in keeping with findings on prior MR imaging of 04/03/2023. Vague areas of hypoattenuation involving the right suprarenal white matter would be in keeping with additional areas of the evolving recent ischemia seen on the 04/03/2023 MRI. Small foci of cortical ischemia elsewhere likely beyond imaging resolution of current CT technique. No definite new areas of macroscopic hemorrhage within the infarct territories. No additional new infarcts additional vascular territories by noncontrast CT.  2. Equivocal mildly asymmetrically increased attenuation within the region of the right M1 MCA.  Repeat CTA could be considered to exclude the possibility of thrombus in light of reportedly new neurologic symptoms.  Clinical correlation recommended in this regard.      Electronically signed by: Roe Stephens  Date:    04/06/2023  Time:    07:53               Narrative:    EXAMINATION:  CT HEAD WITHOUT CONTRAST    CLINICAL HISTORY:  Neuro deficit, acute, stroke suspected;    TECHNIQUE:  Low dose axial images were  obtained through the head.  Coronal and sagittal reformations were also performed. Contrast was not administered.    COMPARISON:  CT head 04/04/2023.    FINDINGS:  Blood: No acute intracranial hemorrhage.    Parenchyma: No definite loss of gray-white differentiation to suggest acute or subacute transcortical infarct.  Continued maturing right frontal opercular and insular infarcts in keeping with findings on prior MR imaging of 04/03/2023.  Vague areas of hypoattenuation involving the right suprarenal white matter would be in keeping with additional areas of the evolving recent ischemia seen on the 04/03/2023 MRI.  Small foci of cortical ischemia elsewhere likely beyond imaging resolution of current CT technique.    No definite new areas of macroscopic hemorrhage within the infarct territories.  No additional new infarcts additional vascular territories by noncontrast CT.    Ventricles/Extra-axial spaces: No abnormal extra-axial fluid collection. Basal cisterns are patent.    Vessels: Equivocal mildly asymmetrically increased attenuation within the region of the right M1 MCA (axial series 2, image 11).    Orbits: Status post bilateral lens replacements.    Scalp: Unremarkable.    Skull: There are no depressed skull fractures or destructive bone lesions.    Sinuses and mastoids: Essentially clear.    Other findings: None                                          Assessment/Plan:     * Stroke due to occlusion of right middle cerebral artery  · Repeat MRI showed  · New signals in right frontal operculum compatible with acute to early subacute ischemia, these findings are in close proximity to previously identified additional evolving early subacute infarcts elsewhere in the frontal lobe, insula, and frontal parietal white matter.   · Additionally, there is a questionable new punctate area of restricted diffusion the right inferior jugular white matter at the level of the atrium of the lateral ventricle.   · And  Questionable increased FLAIR signal within the M2 branches of the right MCA within the sylvian fissure.   · Vascular Neurology consulted, out of window for thrombolytics  · ASA, Plavix and Statin        Elevated troponin, hx of CAD   · Troponin elevated at 0.6, trending down from her previous admission which was 1.0  · No chest pain expressed, continue to monitor      COPD (chronic obstructive pulmonary disease)  · Stable, continue home meds      Essential hypertension  · Permissive hypertension in the setting of new ischemic strokes        VTE Risk Mitigation (From admission, onward)         Ordered     IP VTE HIGH RISK PATIENT  Once         04/06/23 1135     Place sequential compression device  Until discontinued         04/06/23 1135              Critical care time spent on the evaluation and treatment of severe organ dysfunction, review of pertinent labs and imaging studies, discussions with consulting providers and discussions with patient/family: 35 minutes.             Alfredo Blas MD  Department of Hospital Medicine  [facility] [place] [facility]

## 2023-04-06 NOTE — HPI
Ivet Crabtree is a 73 y.o. female who has a past medical history of Anxiety, Depression, Heart attack, History of heart attack, stroke, and Hypertension, bounced back to the ED the next day after discharge with aphasia and left facial droop and found to have new strokes.    Recent hospital course: Ms. Luque is a 73-year-old female who was admitted to the hospital with slurred speech and left facial droop.  Initial CT unrevealing however MRI of the brain revealed an acute infarction in the area of right subinsular cortex and right centrum semiovale.  Also noted old lacunar infarcts in the left dennis.  Symptoms still present with increased left pronator drift.  Consult to neurology was placed.  CTA was ordered and noted evolution of stroke with no evidence of hemorrhage or shifting.  Telemetry reviewed throughout stay and noted to be in normal sinus rhythm without any evidence of atrial fibrillation or atrial flutter.  After reviewing imaging, neurology felt this was cardioembolic in nature and Cardiology was consulted.  Echo with bubble study did not reveal any evidence of shunt.  Recommendations including outpatient Holter monitor versus implanted loop recorder.  She remained on aspirin and Plavix and statin throughout the hospitalization.  On day of discharge 04/06, most of patient's symptoms had resolved and she was feeling well.  I had a long discussion with patient and her daughter about medications, no driving and referral placed a PMR.  We also discussed following up with her cardiologist for continued evaluation of possible arrhythmias.  They both expressed understanding and prescriptions were sent for patient.  Patient discharged home in stable condition.    All symptoms had abated by time of discharge, patient went home and ate a big dinner and felt well. Awoke the next morning around 1 a.m. with a really bad headache, went back to sleep awoke again at 6:00 a.m. with slurred speech and a facial  droop.  No confusion this time and steady gait, all limbs working appropriately.  Vascular neurology consulted in the ED.  Repeat MRI showed new strokes.MRI showed new signals in right frontal operculum compatible with acute to early subacute ischemia, these findings are in close proximity to previously identified additional evolving early subacute infarcts elsewhere in the frontal lobe, insula, and frontal parietal white matter. Additionally, there is a questionable new punctate area of restricted diffusion the right inferior jugular white matter at the level of the atrium of the lateral ventricle. And Questionable increased FLAIR signal within the M2 branches of the right MCA within the sylvian fissure. She did take her ASA, Plavix and Statin were taken this am.

## 2023-04-06 NOTE — PLAN OF CARE
DC NEEDS ASSESSMENT:    PCP:  KIRA Garcia  Pharmacy:  [facility] in [place]    Patient arrived from:  home where she lives with her daughter  Discharge plan at this time:  1) rehab  2)home with [facility]  Barriers to DC:  none noted at this time    CM will continue to follow and finalize plans  CM provided daughter with contact info and encouraged her to call for any discharge needs       04/06/23 2537   Discharge Assessment   Assessment Type Discharge Planning Assessment   Confirmed/corrected address, phone number and insurance Yes   Confirmed Demographics Correct on Facesheet   Source of Information family   Communicated KATHY with patient/caregiver Date not available/Unable to determine   People in Home alone   Do you expect to return to your current living situation? Yes   Do you have help at home or someone to help you manage your care at home? Yes   Prior to hospitilization cognitive status: Alert/Oriented   Current cognitive status:   (per daughter patient is still ambulatory)   Equipment Currently Used at Home none   Readmission within 30 days? No   Patient currently being followed by outpatient case management? No   Do you currently have service(s) that help you manage your care at home? No   Do you take prescription medications? Yes   Do you have prescription coverage? Yes   Do you have any problems affording any of your prescribed medications? No

## 2023-04-06 NOTE — ASSESSMENT & PLAN NOTE
· Repeat MRI showed  · New signals in right frontal operculum compatible with acute to early subacute ischemia, these findings are in close proximity to previously identified additional evolving early subacute infarcts elsewhere in the frontal lobe, insula, and frontal parietal white matter.   · Additionally, there is a questionable new punctate area of restricted diffusion the right inferior jugular white matter at the level of the atrium of the lateral ventricle.   · And Questionable increased FLAIR signal within the M2 branches of the right MCA within the sylvian fissure.   · Vascular Neurology consulted, out of window for thrombolytics  · ASA, Plavix and Statin

## 2023-04-06 NOTE — SUBJECTIVE & OBJECTIVE
Past Medical History:   Diagnosis Date    Anxiety     Depression     Heart attack     12 years ago    History of heart attack     Hypertension        Past Surgical History:   Procedure Laterality Date     SECTION      EYE SURGERY Bilateral     CATARACT       Review of patient's allergies indicates:   Allergen Reactions    Talwin [pentazocine lactate] Shortness Of Breath    Ace inhibitors Other (See Comments)     cough       Current Facility-Administered Medications on File Prior to Encounter   Medication    [DISCONTINUED] acetaminophen tablet 500 mg    [DISCONTINUED] albuterol-ipratropium 2.5 mg-0.5 mg/3 mL nebulizer solution 3 mL    [DISCONTINUED] aspirin EC tablet 81 mg    [DISCONTINUED] atorvastatin tablet 80 mg    [DISCONTINUED] benzonatate capsule 100 mg    [DISCONTINUED] clopidogreL tablet 75 mg    [DISCONTINUED] guaiFENesin 12 hr tablet 600 mg    [DISCONTINUED] melatonin tablet 6 mg    [DISCONTINUED] predniSONE tablet 40 mg    [DISCONTINUED] senna-docusate 8.6-50 mg per tablet 1 tablet    [DISCONTINUED] sodium chloride 0.9% flush 10 mL     Current Outpatient Medications on File Prior to Encounter   Medication Sig    albuterol (VENTOLIN HFA) 90 mcg/actuation inhaler Inhale 2 puffs into the lungs every 4 (four) hours as needed for Wheezing or Shortness of Breath. Rescue (Patient taking differently: Inhale 2 puffs into the lungs every 6 (six) hours as needed for Wheezing or Shortness of Breath. Rescue)    aspirin (ECOTRIN) 325 MG EC tablet Take 325 mg by mouth once daily.    atorvastatin (LIPITOR) 40 MG tablet Take 2 tablets (80 mg total) by mouth once daily.    clopidogreL (PLAVIX) 75 mg tablet Take 1 tablet (75 mg total) by mouth once daily.    EPINEPHrine (EPIPEN JR) 0.15 mg/0.3 mL pen injection Inject 0.15 mg into the muscle.    fluticasone propionate (FLONASE) 50 mcg/actuation nasal spray 1 spray by Each Nostril route once daily.    hydroCHLOROthiazide (HYDRODIURIL) 25 MG tablet Take 1 tablet  (25 mg total) by mouth once daily. Restart on 4/6/23    metoprolol tartrate (LOPRESSOR) 50 MG tablet Take 1 tablet (50 mg total) by mouth 2 (two) times daily. Restart on 4/6/23    nitroGLYCERIN (NITROSTAT) 0.4 MG SL tablet Place 0.4 mg under the tongue.    pantoprazole (PROTONIX) 40 MG tablet Take 40 mg by mouth Daily.    potassium chloride (KLOR-CON) 10 MEQ TbSR Take 10 mEq by mouth Daily.    predniSONE (DELTASONE) 20 MG tablet Take 2 tablets (40 mg total) by mouth once daily.    venlafaxine (EFFEXOR-XR) 75 MG 24 hr capsule Take 75 mg by mouth Daily. Take the 75 mg tab po qd with the 150 mg tab po qd.  Total dose 225 mg po qd.     Family History       Problem Relation (Age of Onset)    Diabetes Mother, Father, Sister    Heart disease Mother, Father, Sister    Hypertension Mother          Tobacco Use    Smoking status: Every Day     Packs/day: 0.50     Types: Cigarettes     Passive exposure: Past    Smokeless tobacco: Never   Substance and Sexual Activity    Alcohol use: Yes     Comment: seldom    Drug use: No    Sexual activity: Not Currently     Review of Systems   Unable to perform ROS: Mental status change   Patient snowed from [facility], unable to participate in ROS or Exam      Objective:     Vital Signs (Most Recent):  Temp: 97.8 °F (36.6 °C) (04/06/23 1045)  Pulse: (!) 58 (04/06/23 1354)  Resp: (!) 23 (04/06/23 1354)  BP: (!) 188/97 (04/06/23 1354)  SpO2: 98 % (04/06/23 1354)   Vital Signs (24h Range):  Temp:  [97.7 °F (36.5 °C)-98.3 °F (36.8 °C)] 97.8 °F (36.6 °C)  Pulse:  [52-71] 58  Resp:  [18-38] 23  SpO2:  [94 %-99 %] 98 %  BP: (113-230)/() 188/97     Weight: 101.2 kg (223 lb)  Body mass index is 35.99 kg/m².    Physical Exam  Vitals and nursing note reviewed.   Constitutional:       General: She is not in acute distress.     Appearance: She is well-developed. She is obese. She is not diaphoretic.   HENT:      Head: Normocephalic and atraumatic.   Eyes:      General: No scleral icterus.     Pupils:  Pupils are equal, round, and reactive to light.   Neck:      Thyroid: No thyromegaly.   Cardiovascular:      Rate and Rhythm: Normal rate and regular rhythm.      Heart sounds: No murmur heard.  Pulmonary:      Effort: Pulmonary effort is normal.      Breath sounds: Normal breath sounds. No stridor. No wheezing or rales.   Abdominal:      General: There is no distension.      Palpations: Abdomen is soft. There is no mass.      Tenderness: There is no abdominal tenderness. There is no guarding.   Musculoskeletal:         General: No swelling or deformity. Normal range of motion.      Cervical back: Normal range of motion and neck supple.   Skin:     General: Skin is warm and dry.      Capillary Refill: Capillary refill takes less than 2 seconds.      Coloration: Skin is not jaundiced.      Findings: No bruising.   Neurological:      Mental Status: Mental status is at baseline.      Cranial Nerves: Cranial nerve deficit and facial asymmetry present.         CRANIAL NERVES     CN III, IV, VI   Pupils are equal, round, and reactive to light.         Recent Results (from the past 24 hour(s))   POCT glucose    Collection Time: 04/06/23  7:22 AM   Result Value Ref Range    POCT Glucose 136 (H) 70 - 110 mg/dL   CBC W/ AUTO DIFFERENTIAL    Collection Time: 04/06/23  8:04 AM   Result Value Ref Range    WBC 14.26 (H) 3.90 - 12.70 K/uL    RBC 5.16 4.00 - 5.40 M/uL    Hemoglobin 15.3 12.0 - 16.0 g/dL    Hematocrit 46.4 37.0 - 48.5 %    MCV 90 82 - 98 fL    MCH 29.7 27.0 - 31.0 pg    MCHC 33.0 32.0 - 36.0 g/dL    RDW 14.4 11.5 - 14.5 %    Platelets 361 150 - 450 K/uL    MPV 10.5 9.2 - 12.9 fL    Immature Granulocytes 0.6 (H) 0.0 - 0.5 %    Gran # (ANC) 9.9 (H) 1.8 - 7.7 K/uL    Immature Grans (Abs) 0.09 (H) 0.00 - 0.04 K/uL    Lymph # 2.8 1.0 - 4.8 K/uL    Mono # 1.3 (H) 0.3 - 1.0 K/uL    Eos # 0.1 0.0 - 0.5 K/uL    Baso # 0.08 0.00 - 0.20 K/uL    nRBC 0 0 /100 WBC    Gran % 69.2 38.0 - 73.0 %    Lymph % 19.9 18.0 - 48.0 %     Mono % 9.1 4.0 - 15.0 %    Eosinophil % 0.6 0.0 - 8.0 %    Basophil % 0.6 0.0 - 1.9 %    Differential Method Automated    Comprehensive metabolic panel    Collection Time: 04/06/23  8:04 AM   Result Value Ref Range    Sodium 140 136 - 145 mmol/L    Potassium 3.9 3.5 - 5.1 mmol/L    Chloride 111 (H) 95 - 110 mmol/L    CO2 18 (L) 23 - 29 mmol/L    Glucose 156 (H) 70 - 110 mg/dL    BUN 20 8 - 23 mg/dL    Creatinine 0.8 0.5 - 1.4 mg/dL    Calcium 8.8 8.7 - 10.5 mg/dL    Total Protein 7.3 6.0 - 8.4 g/dL    Albumin 3.7 3.5 - 5.2 g/dL    Total Bilirubin 0.4 0.1 - 1.0 mg/dL    Alkaline Phosphatase 118 55 - 135 U/L    AST 18 10 - 40 U/L    ALT 25 10 - 44 U/L    Anion Gap 11 8 - 16 mmol/L    eGFR >60 >60 mL/min/1.73 m^2   Protime-INR    Collection Time: 04/06/23  8:04 AM   Result Value Ref Range    Prothrombin Time 11.4 9.0 - 12.5 sec    INR 1.1 0.8 - 1.2   TSH    Collection Time: 04/06/23  8:04 AM   Result Value Ref Range    TSH 3.775 0.400 - 4.000 uIU/mL   LDL - Lipid Panel    Collection Time: 04/06/23  8:04 AM   Result Value Ref Range    Cholesterol 130 120 - 199 mg/dL    Triglycerides 120 30 - 150 mg/dL    HDL 51 40 - 75 mg/dL    LDL Cholesterol 55.0 (L) 63.0 - 159.0 mg/dL    HDL/Cholesterol Ratio 39.2 20.0 - 50.0 %    Total Cholesterol/HDL Ratio 2.5 2.0 - 5.0    Non-HDL Cholesterol 79 mg/dL   Troponin I    Collection Time: 04/06/23  8:04 AM   Result Value Ref Range    Troponin I 0.614 (H) 0.000 - 0.026 ng/mL   ISTAT PROCEDURE    Collection Time: 04/06/23  8:10 AM   Result Value Ref Range    POC Glucose 162 (H) 70 - 110 mg/dL    POC BUN 22 6 - 30 mg/dL    POC Creatinine 0.7 0.5 - 1.4 mg/dL    POC Sodium 143 136 - 145 mmol/L    POC Potassium 3.8 3.5 - 5.1 mmol/L    POC Chloride 107 95 - 110 mmol/L    POC TCO2 (MEASURED) 23 23 - 29 mmol/L    POC Anion Gap 18 (H) 8 - 16 mmol/L    POC Ionized Calcium 1.22 1.06 - 1.42 mmol/L    POC Hematocrit 47 36 - 54 %PCV    Sample VENOUS        Microbiology Results (last 7 days)       **  No results found for the last 168 hours. **             Imaging Results               MRI Brain Without Contrast (Final result)  Result time 04/06/23 10:35:17      Final result by Roe Stephens MD (04/06/23 10:35:17)                   Impression:      1. Since prior MRI of the brain performed 04/03/2023, there is new restricted diffusion and FLAIR signal abnormality in the right frontal operculum compatible with acute to early subacute ischemia.  These findings are in close proximity to previously identified additional evolving early subacute infarcts elsewhere in the frontal lobe, insula, and frontal parietal white matter.  2. Additionally, there is a questionable new punctate area of restricted diffusion the right inferior jugular white matter at the level of the atrium of the lateral ventricle.  3. Questionable increased FLAIR signal within the M2 branches of the right MCA within the sylvian fissure.  Finding nonspecific, potentially artifactual, though slow and/or disorganized flow related to proximal steno-occlusive disease is additionally considered.  CTA or MRA could be performed for further characterization as warranted.  This report was flagged in Epic as abnormal.      Electronically signed by: Roe Stephens  Date:    04/06/2023  Time:    10:35               Narrative:    EXAMINATION:  MRI BRAIN WITHOUT CONTRAST    CLINICAL HISTORY:  Stroke, follow up;    TECHNIQUE:  Multiplanar multisequence MR imaging of the brain was performed without contrast.    COMPARISON:  MRI brain 04/03/2023.  CT head, 04/06/2023.    FINDINGS:  Parenchyma: Since prior MRI of the brain performed 04/03/2023, there is new restricted diffusion and FLAIR signal abnormality in the right frontal operculum compatible with acute to early subacute ischemia.  These findings are in close proximity to previously identified additional evolving early subacute infarcts elsewhere in the frontal lobe, insula, and frontal parietal white  matter.    Additionally, there is a questionable new punctate area of restricted diffusion the right inferior jugular white matter at the level of the atrium of the lateral ventricle (axial DWI series 3, image 15).No definite associated hemorrhage or mass effect, noting limitations imposed by motion.    Elsewhere, generalized pattern age-related parenchymal volume loss.  Nonspecific areas of T2/FLAIR hyperintense signal in the frontoparietal white matter would be in keeping with sequela of chronic small vessel ischemic disease.    Additional comments: There is no midline shift, abnormal extra-axial fluid collection, or acute intracranial hemorrhage. The basal cisterns are patent.    Ventricles: Normal.    Flow voids: Questionable increased FLAIR signal within the M2 branches of the right MCA within the sylvian fissure (axial series 8, image 12).  The normal major intracranial arterial flow voids are otherwise grossly visualized.    Sinuses and mastoid air cells: Essentially clear    Orbits: Status post bilateral lens replacements.    Midline structures: The pituitary and craniocervical junction are normal.    Marrow: Normal                                         MRA Brain without contrast (No Result on File)  Result time 04/06/23 09:49:24                     CT Head Without Contrast (Final result)  Result time 04/06/23 07:53:49      Final result by Roe Stephens MD (04/06/23 07:53:49)                   Impression:      1. Continued maturing right frontal opercular and insular infarcts in keeping with findings on prior MR imaging of 04/03/2023. Vague areas of hypoattenuation involving the right suprarenal white matter would be in keeping with additional areas of the evolving recent ischemia seen on the 04/03/2023 MRI. Small foci of cortical ischemia elsewhere likely beyond imaging resolution of current CT technique. No definite new areas of macroscopic hemorrhage within the infarct territories. No additional  new infarcts additional vascular territories by noncontrast CT.  2. Equivocal mildly asymmetrically increased attenuation within the region of the right M1 MCA.  Repeat CTA could be considered to exclude the possibility of thrombus in light of reportedly new neurologic symptoms.  Clinical correlation recommended in this regard.      Electronically signed by: Roe Stephens  Date:    04/06/2023  Time:    07:53               Narrative:    EXAMINATION:  CT HEAD WITHOUT CONTRAST    CLINICAL HISTORY:  Neuro deficit, acute, stroke suspected;    TECHNIQUE:  Low dose axial images were obtained through the head.  Coronal and sagittal reformations were also performed. Contrast was not administered.    COMPARISON:  CT head 04/04/2023.    FINDINGS:  Blood: No acute intracranial hemorrhage.    Parenchyma: No definite loss of gray-white differentiation to suggest acute or subacute transcortical infarct.  Continued maturing right frontal opercular and insular infarcts in keeping with findings on prior MR imaging of 04/03/2023.  Vague areas of hypoattenuation involving the right suprarenal white matter would be in keeping with additional areas of the evolving recent ischemia seen on the 04/03/2023 MRI.  Small foci of cortical ischemia elsewhere likely beyond imaging resolution of current CT technique.    No definite new areas of macroscopic hemorrhage within the infarct territories.  No additional new infarcts additional vascular territories by noncontrast CT.    Ventricles/Extra-axial spaces: No abnormal extra-axial fluid collection. Basal cisterns are patent.    Vessels: Equivocal mildly asymmetrically increased attenuation within the region of the right M1 MCA (axial series 2, image 11).    Orbits: Status post bilateral lens replacements.    Scalp: Unremarkable.    Skull: There are no depressed skull fractures or destructive bone lesions.    Sinuses and mastoids: Essentially clear.    Other findings: None

## 2023-04-06 NOTE — PHARMACY MED REC
UNABLE TO ASSESS PATIENT AT THIS TIME.        Alta Rodrigues [facility].  [phone]            .

## 2023-04-06 NOTE — SUBJECTIVE & OBJECTIVE
"  Woke up with symptoms?: yes    Recent bleeding noted: yes     Does the patient take any Blood Thinners? no     Medications: Antiplatelets:  aspirin and clopidogrel/Plavix    Past Medical History: hypertension, hyperlipidemia, MI/CAD and stroke    Past Surgical History: no relevant surgical history    Family History: no relevant history    Social History: no smoking, no drinking, no drugs    Allergies: Talwin [Pentazocine Lactate]  Ace Inhibitors     Review of Systems   Constitutional:  Negative for chills and fever.   HENT:  Negative for drooling and ear discharge.    Eyes:  Negative for pain and itching.   Respiratory:  Positive for cough and shortness of breath.    Cardiovascular:  Negative for chest pain and leg swelling.   Gastrointestinal:  Negative for abdominal distention and abdominal pain.   Endocrine: Negative for heat intolerance and polydipsia.   Genitourinary:  Negative for difficulty urinating and dyspareunia.   Musculoskeletal:  Positive for arthralgias and back pain.   Skin:  Negative for rash and wound.   Allergic/Immunologic: Negative for immunocompromised state.   Neurological:  Positive for facial asymmetry, speech difficulty and weakness.   Psychiatric/Behavioral:  Negative for agitation and confusion.     The following systems were reviewed with pertinent positives and negatives documented in the HPI: Constitutional, Eyes, CV, Respiratory, GI, , Musculoskeletal, Skin, Neurological, Psychiatric      Objective:   Vitals: Blood pressure (!) 192/113, pulse (!) 58, temperature 98.3 °F (36.8 °C), temperature source Oral, resp. rate (!) 38, height 5' 6" (1.676 m), weight 101.2 kg (223 lb), SpO2 98 %.     CT READ: Yes  Abnormal CT Evolving infarctions in the right MCA territory.     Physical Exam  Vitals and nursing note reviewed.   Constitutional:       Appearance: Normal appearance. She is obese.   HENT:      Head: Normocephalic and atraumatic.   Eyes:      General: Lids are normal.      " Patient called from wait list 2X , removing from list   Extraocular Movements: Extraocular movements intact.      Conjunctiva/sclera: Conjunctivae normal.      Pupils: Pupils are equal, round, and reactive to light.   Cardiovascular:      Rate and Rhythm: Normal rate and regular rhythm.   Pulmonary:      Effort: Pulmonary effort is normal.      Breath sounds: Normal breath sounds.      Comments: Coarse breath sounds oniel  Abdominal:      General: Abdomen is flat. Bowel sounds are normal.      Palpations: Abdomen is soft.   Musculoskeletal:         General: Normal range of motion.      Cervical back: Normal range of motion.   Skin:     General: Skin is warm and dry.   Neurological:      Mental Status: She is alert and oriented to person, place, and time.      Motor: Weakness present.      Comments: Left facial weakness, dysarthria, LUE drift,    Psychiatric:         Mood and Affect: Mood normal.         Behavior: Behavior normal.

## 2023-04-06 NOTE — NURSING
Inpatient consult to neuro was ordered at 1650 and the consult was called in at 1657 to the on-call number. Message was left with patient name and MRN along with call back number and name of facility.

## 2023-04-06 NOTE — PROVIDER TRANSFER
Patient bounced back with new strokes, still unclear why. ECHO bubble without shunt, and no signs of AFib. [facility] Neurology tele assessed patient and determined OOW for thrombolytics or other interventions. On ASA/Plavix/Statin.      She was very drowsy after benzo to sedate for imaging, so monitored in ICU  No ICU needs at this time, will step down    Consulted Neuro for further recommendations and to see if any utility/benefit to transfer to [facility], f/u their recs  Discussed transfer to [facility] neuro step down for close monitoring and further care with Dr Maria Elena Mir, potential transfer  Reconsulted PT/OT/SLP as her abilities/dispo may have changed            Alfredo Blas MD  Internal Medicine Staff

## 2023-04-06 NOTE — Clinical Note
Diagnosis: Stroke [030786]   Admitting Provider:: YOEL JOHN [[id number]]   Future Attending Provider: WANDA JOSE [[id number]]   Reason for IP Medical Treatment  (Clinical interventions that can only be accomplished in the IP setting? ) :: stroke   I certify that Inpatient services for greater than or equal to 2 midnights are medically necessary:: Yes   Plans for Post-Acute care--if anticipated (pick the single best option):: A. No post acute care anticipated at this time

## 2023-04-06 NOTE — DISCHARGE SUMMARY
[facility] Medicine  Discharge Summary      Patient Name: Ivet Crabtree  MRN: [id number]  [facility]: [id number]  Patient Class: IP- Inpatient  Admission Date: 4/3/2023  Hospital Length of Stay: 1 days  Discharge Date and Time: 4/5/2023  5:41 PM  Attending Physician: No att. providers found   Discharging Provider: Ben Belcher MD  Primary Care Provider: Zoie Garcia MD    Primary Care Team: Networked reference to record PCT     HPI:   Ivet Crabtree 73 y.o. female with CAD, HTN, COPD, anxiety presents to the hospital with a chief complaint of facial droop.  She reports today at 1:30 p.m. she was informed her daughter she had a facial droop and slurred speech.  The symptoms remained constant without aggravating alleviating factors.  They have never occurred before.  She is compliant with home aspirin and statin.  She no longer smokes.  She denies numbness weakness of extremity incontinence visual disturbance leg swelling melena hematuria hematemesis, hearing changes tinnitus seizures.    In the ED, seen by tele Neurology not a candidate for tPA CT head without acute intracranial abnormality afebrile without leukocytosis INR 1.2 LDL 66 TSH within normal limits COVID negative flu negative.      * No surgery found *      Hospital Course:   Ms. Luque is a 73-year-old female who was admitted to the hospital with slurred speech and left facial droop.  Initial CT unrevealing however MRI of the brain revealed an acute infarction in the area of right subinsular cortex and right centrum semiovale.  Also noted old lacunar infarcts in the left den[place].  Symptoms still present with increased left pronator drift.  Consult to neurology was placed.  CTA was ordered and noted evolution of stroke with no evidence of hemorrhage or shifting.  Telemetry reviewed throughout stay and noted to be in normal sinus rhythm without any evidence of atrial fibrillation or atrial flutter.  After reviewing imaging, neurology  felt this was cardioembolic in nature and Cardiology was consulted.  Echo with bubble study did not reveal any evidence of shunt.  Recommendations including outpatient Holter monitor versus implanted loop recorder.  She remained on aspirin and Plavix and statin throughout the hospitalization.  On day of discharge 04/06, most of patient's symptoms had resolved and she was feeling well.  I had a long discussion with patient and her daughter about medications, no driving and referral placed a PMR.  We also discussed following up with her cardiologist for continued evaluation of possible arrhythmias.  They both expressed understanding and prescriptions were sent for patient.  Patient discharged home in stable condition.           Goals of Care Treatment Preferences:  Code Status: Full Code      Consults:   Consults (From admission, onward)        Status Ordering Provider     Inpatient consult to Cardiology  Once        Provider:  Riley Agustin MD    Completed EDDIE ALVAREZ     Inpatient consult to Telemedicine-General Neurology  Once        Provider:  Fernando Rnee MD    Completed PAIGE REEDER     Inpatient consult to Registered Dietitian/Nutritionist  Once        Provider:  (Not yet assigned)    Completed PAIGE REEDER     IP consult to case management/social work  Once        Provider:  (Not yet assigned)    Completed PAIGE REEDER     Inpatient consult to Neurology  Once        Provider:  Fernando Rene MD    Completed PAIGE REEDER          No new Assessment & Plan notes have been filed under this hospital service since the last note was generated.  Service: Hospital Medicine    Final Active Diagnoses:    Diagnosis Date Noted POA    PRINCIPAL PROBLEM:  Acute CVA (cerebrovascular accident) [I63.9] 04/03/2023 Yes    Elevated troponin, hx of CAD  [R77.8] 04/04/2023 Yes    Dyslipidemia [E78.5] 04/04/2023 Yes    COPD (chronic obstructive pulmonary disease) [J44.9] 04/03/2023 Yes     Atherosclerosis of coronary artery without angina pectoris [I25.10] 09/27/2021 Yes    Essential hypertension [I10] 09/27/2021 Yes      Problems Resolved During this Admission:       Discharged Condition: stable    Disposition: Home or Self Care    Follow Up:   Follow-up Information     Martin Carney MD Follow up on 4/19/2023.    Specialty: Cardiology  Why: Appointment scheduled for 7:30am  Contact information:  [address]  [address]  [facility] [id number] [phone]             Zoie Garcia MD Follow up on 4/13/2023.    Specialty: Internal Medicine  Why: Keep already scheduled appointment for 10:30am  Contact information:  [address] [place] [address] [phone]             OUT PATIENT CASE MANAGEMENT Follow up.    Why: out patient services  Contact information:  WILL CALL PATIENT WITH AN APPT           OUT PATIENT PHYSICAL THERAPY Follow up.    Why: out patient services  Contact information:  WILL CALL PATIENT                     Patient Instructions:      Ambulatory referral/consult to Physical Medicine Rehab   Standing Status: Future   Referral Priority: Routine Referral Type: Rehabilitation   Referral Reason: Specialty Services Required   Requested Specialty: Physical Medicine and Rehabilitation   Number of Visits Requested: 1     Ambulatory referral/consult to Outpatient Case Management   Referral Priority: Routine Referral Type: Consultation   Referral Reason: Specialty Services Required   Number of Visits Requested: 1     Diet Cardiac   Order Comments: See Stroke Patient Education Guide Booklet for details.     Call 911 for any of the following:   Order Comments: Call 911  right away if any of the following warning signs come on suddenly, even if the symptoms only last for a few minutes. With stroke, timing is very important.   - Warning Signs of Stroke:  - Weakness: You may feel a sudden weakness, tingling or loss of feeling on one side of your face or body.  -  Vision Problems: You may have sudden double vision or trouble seeing in one or both eyes.  - Speech Problems: You may have sudden trouble talking, slured speech, or problems understanding others.  - Headache: You may have sudden, severe headache.  - Movement Problems: You may experience dizziness, a feeling of spinning, a loss of balance, a feeling of falling or blackouts.     Activity as tolerated     Call MD for:  temperature >100.4     Call MD for:  persistent nausea and vomiting or diarrhea     Call MD for:  severe uncontrolled pain     Call MD for:  redness, tenderness, or signs of infection (pain, swelling, redness, odor or green/yellow discharge around incision site)     Call MD for:  difficulty breathing or increased cough     Call MD for:  severe persistent headache     Call MD for:  worsening rash     Call MD for:  persistent dizziness, light-headedness, or visual disturbances       Significant Diagnostic Studies: Labs: All labs within the past 24 hours have been reviewed    CTA Head and Neck (xpd)   Final Result   Abnormal      1. Continued evolution of recent infarction in the right insula and adjoining white matter, as seen to better advantage on prior MR imaging of 04/03/2023.  No definite macroscopic hemorrhage.  No definite intercurrent ischemia additional vascular territories by noncontrast CT.   2. Occlusion of proximal superior division M2 branch within the sylvian fissure.  There is some degree of thready opacification of more distal MCA branches, remaining diminutive in caliber when compared to the contralateral side.   3. Elsewhere, no additional areas of acute large vessel occlusion or flow-limiting stenosis.  Details of cervical and intracranial atherosclerotic disease, as provided in the body of the report.   4. Nonspecific sub 5 mm solid pulmonary nodules.  For multiple solid nodules all <6 mm, Fleischner Society 2017 guidelines recommend no routine follow up for a low risk patient, or  follow up with non-contrast chest CT at 12 months after discovery in a high risk patient.   5. Additional details, as provided in the body of the report.   This report was flagged in Epic as abnormal.         Electronically signed by: Roe Stephens   Date:    04/04/2023   Time:    13:00      MRI Brain Without Contrast   Final Result      Diffusion restriction in the right subinsular cortex and in the right centrum semiovale, consistent with acute infarctions.      String-like fashion involvement in the right centrum semiovale may represent watershed infarctions.  No evidence of hemorrhagic conversion.      Case discussed with VERÓNICA Morales on 04/03/2023 at 22:08.         Electronically signed by: Zev Garcia MD   Date:    04/03/2023   Time:    22:08      CT Head Without Contrast   Final Result      No acute intracranial abnormality detected at this time..  Mild chronic small vessel ischemic changes.  If persistent neurological symptoms, recommend MRI of the brain with diffusion-weighted sequencing.         Electronically signed by: Wandy Nichols   Date:    04/03/2023   Time:    17:50          Results for orders placed during the hospital encounter of 04/03/23    Echo Saline Bubble? Yes    Interpretation Summary  · Limited echo performed for bubble study.  · There is no evidence of intracardiac shunting (negative bubble study).    Pending Diagnostic Studies:     None         Medications:  Reconciled Home Medications:      Medication List      START taking these medications    clopidogreL 75 mg tablet  Commonly known as: PLAVIX  Take 1 tablet (75 mg total) by mouth once daily.     predniSONE 20 MG tablet  Commonly known as: DELTASONE  Take 2 tablets (40 mg total) by mouth once daily.        CHANGE how you take these medications    albuterol 90 mcg/actuation inhaler  Commonly known as: VENTOLIN HFA  Inhale 2 puffs into the lungs every 4 (four) hours as needed for Wheezing or Shortness of Breath. Rescue  What  changed: when to take this     atorvastatin 40 MG tablet  Commonly known as: LIPITOR  Take 2 tablets (80 mg total) by mouth once daily.  What changed: how much to take     hydroCHLOROthiazide 25 MG tablet  Commonly known as: HYDRODIURIL  Take 1 tablet (25 mg total) by mouth once daily. Restart on 4/6/23  What changed: additional instructions     metoprolol tartrate 50 MG tablet  Commonly known as: LOPRESSOR  Take 1 tablet (50 mg total) by mouth 2 (two) times daily. Restart on 4/6/23  What changed: additional instructions     venlafaxine 75 MG 24 hr capsule  Commonly known as: EFFEXOR-XR  Take 75 mg by mouth Daily. Take the 75 mg tab po qd with the 150 mg tab po qd.  Total dose 225 mg po qd.  What changed: Another medication with the same name was removed. Continue taking this medication, and follow the directions you see here.        CONTINUE taking these medications    aspirin 325 MG EC tablet  Commonly known as: ECOTRIN  Take 325 mg by mouth once daily.     EPINEPHrine 0.15 mg/0.3 mL pen injection  Commonly known as: EPIPEN JR  Inject 0.15 mg into the muscle.     fluticasone propionate 50 mcg/actuation nasal spray  Commonly known as: FLONASE  1 spray by Each Nostril route once daily.     nitroGLYCERIN 0.4 MG SL tablet  Commonly known as: NITROSTAT  Place 0.4 mg under the tongue.     pantoprazole 40 MG tablet  Commonly known as: PROTONIX  Take 40 mg by mouth Daily.     potassium chloride 10 MEQ Tbsr  Commonly known as: KLOR-CON  Take 10 mEq by mouth Daily.        STOP taking these medications    famotidine 20 MG tablet  Commonly known as: PEPCID            Indwelling Lines/Drains at time of discharge:   Lines/Drains/Airways     None                 Time spent on the discharge of patient: >35 minutes         Ben Belcher MD  Department of Hospital Medicine  [facility]

## 2023-04-06 NOTE — ED NOTES
"Pt to the ED for CC of stroke. Pt had stoked Monday was seen here and discharged last evening. Daughter states pt complained of headache around 1am and was given tylenol. Pt had no other symptoms at that time. Daughter states around 6:30 am she noticed the pt had left sided facial droop and was slurring. Daughter states when pt had the stroke Monday, she had a facial droop on the left side as well but it went away. Pt just started taking Plavix this week. Daughter states pt was "completely normal" as of yesterday until this morning.  "

## 2023-04-07 PROBLEM — R07.9 CHEST PAIN, MODERATE CORONARY ARTERY RISK: Status: RESOLVED | Noted: 2021-09-27 | Resolved: 2023-04-07

## 2023-04-07 PROBLEM — E78.5 DYSLIPIDEMIA: Status: RESOLVED | Noted: 2023-04-04 | Resolved: 2023-04-07

## 2023-04-07 PROBLEM — J41.0 SIMPLE CHRONIC BRONCHITIS: Status: RESOLVED | Noted: 2021-09-27 | Resolved: 2023-04-07

## 2023-04-07 PROBLEM — G81.94 HEMIPARESIS, LEFT: Status: ACTIVE | Noted: 2023-04-07

## 2023-04-07 PROBLEM — R47.1 DYSARTHRIA: Status: ACTIVE | Noted: 2023-04-07

## 2023-04-07 PROBLEM — E11.49 DIABETES MELLITUS TYPE 2 WITH NEUROLOGICAL MANIFESTATIONS: Status: ACTIVE | Noted: 2023-04-07

## 2023-04-07 PROBLEM — E66.1 CLASS 2 DRUG-INDUCED OBESITY WITH BODY MASS INDEX (BMI) OF 36.0 TO 36.9 IN ADULT: Status: ACTIVE | Noted: 2023-04-07

## 2023-04-07 LAB
BASOPHILS # BLD AUTO: 0.04 K/UL (ref 0–0.2)
BASOPHILS NFR BLD: 0.3 % (ref 0–1.9)
DIFFERENTIAL METHOD: ABNORMAL
EOSINOPHIL # BLD AUTO: 0 K/UL (ref 0–0.5)
EOSINOPHIL NFR BLD: 0.1 % (ref 0–8)
ERYTHROCYTE [DISTWIDTH] IN BLOOD BY AUTOMATED COUNT: 14.2 % (ref 11.5–14.5)
HCT VFR BLD AUTO: 45.2 % (ref 37–48.5)
HGB BLD-MCNC: 14.6 G/DL (ref 12–16)
IMM GRANULOCYTES # BLD AUTO: 0.07 K/UL (ref 0–0.04)
IMM GRANULOCYTES NFR BLD AUTO: 0.5 % (ref 0–0.5)
LYMPHOCYTES # BLD AUTO: 3.4 K/UL (ref 1–4.8)
LYMPHOCYTES NFR BLD: 25.9 % (ref 18–48)
MCH RBC QN AUTO: 30 PG (ref 27–31)
MCHC RBC AUTO-ENTMCNC: 32.3 G/DL (ref 32–36)
MCV RBC AUTO: 93 FL (ref 82–98)
MONOCYTES # BLD AUTO: 1.2 K/UL (ref 0.3–1)
MONOCYTES NFR BLD: 9.2 % (ref 4–15)
NEUTROPHILS # BLD AUTO: 8.3 K/UL (ref 1.8–7.7)
NEUTROPHILS NFR BLD: 64 % (ref 38–73)
NRBC BLD-RTO: 0 /100 WBC
PLATELET # BLD AUTO: 307 K/UL (ref 150–450)
PMV BLD AUTO: 10.4 FL (ref 9.2–12.9)
POCT GLUCOSE: 151 MG/DL (ref 70–110)
POCT GLUCOSE: 193 MG/DL (ref 70–110)
POCT GLUCOSE: 241 MG/DL (ref 70–110)
RBC # BLD AUTO: 4.86 M/UL (ref 4–5.4)
WBC # BLD AUTO: 13 K/UL (ref 3.9–12.7)

## 2023-04-07 PROCEDURE — 99900035 HC TECH TIME PER 15 MIN (STAT)

## 2023-04-07 PROCEDURE — 94761 N-INVAS EAR/PLS OXIMETRY MLT: CPT

## 2023-04-07 PROCEDURE — 27000221 HC OXYGEN, UP TO 24 HOURS

## 2023-04-07 PROCEDURE — 36415 COLL VENOUS BLD VENIPUNCTURE: CPT

## 2023-04-07 PROCEDURE — 92610 EVALUATE SWALLOWING FUNCTION: CPT

## 2023-04-07 PROCEDURE — 27000646 HC AEROBIKA DEVICE

## 2023-04-07 PROCEDURE — 97116 GAIT TRAINING THERAPY: CPT

## 2023-04-07 PROCEDURE — 25000003 PHARM REV CODE 250: Performed by: NURSE PRACTITIONER

## 2023-04-07 PROCEDURE — 25000242 PHARM REV CODE 250 ALT 637 W/ HCPCS: Performed by: STUDENT IN AN ORGANIZED HEALTH CARE EDUCATION/TRAINING PROGRAM

## 2023-04-07 PROCEDURE — 94640 AIRWAY INHALATION TREATMENT: CPT

## 2023-04-07 PROCEDURE — 94664 DEMO&/EVAL PT USE INHALER: CPT | Mod: XB

## 2023-04-07 PROCEDURE — 97530 THERAPEUTIC ACTIVITIES: CPT

## 2023-04-07 PROCEDURE — 99233 SBSQ HOSP IP/OBS HIGH 50: CPT | Mod: ,,, | Performed by: STUDENT IN AN ORGANIZED HEALTH CARE EDUCATION/TRAINING PROGRAM

## 2023-04-07 PROCEDURE — 97161 PT EVAL LOW COMPLEX 20 MIN: CPT

## 2023-04-07 PROCEDURE — 25000242 PHARM REV CODE 250 ALT 637 W/ HCPCS: Performed by: NURSE PRACTITIONER

## 2023-04-07 PROCEDURE — 97165 OT EVAL LOW COMPLEX 30 MIN: CPT

## 2023-04-07 PROCEDURE — 99900031 HC PATIENT EDUCATION (STAT)

## 2023-04-07 PROCEDURE — 11000001 HC ACUTE MED/SURG PRIVATE ROOM

## 2023-04-07 PROCEDURE — G0378 HOSPITAL OBSERVATION PER HR: HCPCS

## 2023-04-07 PROCEDURE — 25000003 PHARM REV CODE 250: Performed by: STUDENT IN AN ORGANIZED HEALTH CARE EDUCATION/TRAINING PROGRAM

## 2023-04-07 PROCEDURE — 85025 COMPLETE CBC W/AUTO DIFF WBC: CPT

## 2023-04-07 PROCEDURE — 25500020 PHARM REV CODE 255: Performed by: STUDENT IN AN ORGANIZED HEALTH CARE EDUCATION/TRAINING PROGRAM

## 2023-04-07 PROCEDURE — 99233 PR SUBSEQUENT HOSPITAL CARE,LEVL III: ICD-10-PCS | Mod: ,,, | Performed by: STUDENT IN AN ORGANIZED HEALTH CARE EDUCATION/TRAINING PROGRAM

## 2023-04-07 PROCEDURE — 63600175 PHARM REV CODE 636 W HCPCS: Performed by: STUDENT IN AN ORGANIZED HEALTH CARE EDUCATION/TRAINING PROGRAM

## 2023-04-07 PROCEDURE — 94640 AIRWAY INHALATION TREATMENT: CPT | Mod: XB

## 2023-04-07 PROCEDURE — 97535 SELF CARE MNGMENT TRAINING: CPT

## 2023-04-07 RX ORDER — VENLAFAXINE HYDROCHLORIDE 37.5 MG/1
75 CAPSULE, EXTENDED RELEASE ORAL DAILY
Status: DISCONTINUED | OUTPATIENT
Start: 2023-04-07 | End: 2023-04-10 | Stop reason: HOSPADM

## 2023-04-07 RX ORDER — DEXTROSE 40 %
30 GEL (GRAM) ORAL
Status: DISCONTINUED | OUTPATIENT
Start: 2023-04-07 | End: 2023-04-10 | Stop reason: HOSPADM

## 2023-04-07 RX ORDER — INSULIN ASPART 100 [IU]/ML
0-5 INJECTION, SOLUTION INTRAVENOUS; SUBCUTANEOUS
Status: DISCONTINUED | OUTPATIENT
Start: 2023-04-07 | End: 2023-04-07

## 2023-04-07 RX ORDER — GLUCAGON 1 MG
1 KIT INJECTION
Status: DISCONTINUED | OUTPATIENT
Start: 2023-04-07 | End: 2023-04-07

## 2023-04-07 RX ORDER — IPRATROPIUM BROMIDE AND ALBUTEROL SULFATE 2.5; .5 MG/3ML; MG/3ML
3 SOLUTION RESPIRATORY (INHALATION) EVERY 6 HOURS
Status: DISCONTINUED | OUTPATIENT
Start: 2023-04-07 | End: 2023-04-10 | Stop reason: HOSPADM

## 2023-04-07 RX ORDER — INSULIN ASPART 100 [IU]/ML
0-5 INJECTION, SOLUTION INTRAVENOUS; SUBCUTANEOUS
Status: DISCONTINUED | OUTPATIENT
Start: 2023-04-07 | End: 2023-04-10 | Stop reason: HOSPADM

## 2023-04-07 RX ORDER — DEXTROSE 40 %
15 GEL (GRAM) ORAL
Status: DISCONTINUED | OUTPATIENT
Start: 2023-04-07 | End: 2023-04-10 | Stop reason: HOSPADM

## 2023-04-07 RX ORDER — GUAIFENESIN 600 MG/1
600 TABLET, EXTENDED RELEASE ORAL 2 TIMES DAILY
Status: DISCONTINUED | OUTPATIENT
Start: 2023-04-07 | End: 2023-04-10 | Stop reason: HOSPADM

## 2023-04-07 RX ADMIN — IPRATROPIUM BROMIDE AND ALBUTEROL SULFATE 3 ML: 2.5; .5 SOLUTION RESPIRATORY (INHALATION) at 12:04

## 2023-04-07 RX ADMIN — IPRATROPIUM BROMIDE AND ALBUTEROL SULFATE 3 ML: 2.5; .5 SOLUTION RESPIRATORY (INHALATION) at 08:04

## 2023-04-07 RX ADMIN — GUAIFENESIN 600 MG: 600 TABLET, EXTENDED RELEASE ORAL at 08:04

## 2023-04-07 RX ADMIN — ATORVASTATIN CALCIUM 80 MG: 40 TABLET, FILM COATED ORAL at 08:04

## 2023-04-07 RX ADMIN — IPRATROPIUM BROMIDE AND ALBUTEROL SULFATE 3 ML: 2.5; .5 SOLUTION RESPIRATORY (INHALATION) at 02:04

## 2023-04-07 RX ADMIN — ASPIRIN 81 MG: 81 TABLET, COATED ORAL at 08:04

## 2023-04-07 RX ADMIN — PANTOPRAZOLE SODIUM 40 MG: 40 TABLET, DELAYED RELEASE ORAL at 04:04

## 2023-04-07 RX ADMIN — IPRATROPIUM BROMIDE AND ALBUTEROL SULFATE 3 ML: 2.5; .5 SOLUTION RESPIRATORY (INHALATION) at 09:04

## 2023-04-07 RX ADMIN — VENLAFAXINE HYDROCHLORIDE 75 MG: 37.5 CAPSULE, EXTENDED RELEASE ORAL at 10:04

## 2023-04-07 RX ADMIN — IPRATROPIUM BROMIDE AND ALBUTEROL SULFATE 3 ML: 2.5; .5 SOLUTION RESPIRATORY (INHALATION) at 03:04

## 2023-04-07 RX ADMIN — IOHEXOL 100 ML: 350 INJECTION, SOLUTION INTRAVENOUS at 07:04

## 2023-04-07 RX ADMIN — Medication 6 MG: at 07:04

## 2023-04-07 RX ADMIN — MUPIROCIN: 20 OINTMENT TOPICAL at 08:04

## 2023-04-07 RX ADMIN — PREDNISONE 40 MG: 20 TABLET ORAL at 08:04

## 2023-04-07 RX ADMIN — CLOPIDOGREL BISULFATE 75 MG: 75 TABLET ORAL at 08:04

## 2023-04-07 RX ADMIN — GUAIFENESIN 600 MG: 600 TABLET, EXTENDED RELEASE ORAL at 07:04

## 2023-04-07 NOTE — PLAN OF CARE
Problem: Adult Inpatient Plan of Care  Goal: Readiness for Transition of Care  Outcome: Ongoing, Progressing     Problem: Adult Inpatient Plan of Care  Goal: Optimal Comfort and Wellbeing  Outcome: Ongoing, Progressing     POC reviewed with the patient and they verbalized understanding. All comments and concerns addressed. Bed locked in lowest position with bed alarm set, call light within reach. Safety precautions maintained. VSS, see flowsheets. shift. Will continue to monitor for changes to POC and clinical condition.

## 2023-04-07 NOTE — NURSING
Pt transferred to [facility] room 923, via EMS. Pt A&Ox4, 2L NC. VSS, in no acute distress. Receiving RN and pt's daughter notified of transport. All belongings and chart accompanied pt.

## 2023-04-07 NOTE — NURSING
Nurses Note -- 4 Eyes      4/7/2023   3:24 AM      Skin assessed during: Transfer      [x] No Pressure Injuries Present    []Prevention Measures Documented      [] Yes- Altered Skin Integrity Present or Discovered   [] LDA Added if Not in Epic (Describe Wound)   [] New Altered Skin Integrity was Present on Admit and Documented in LDA   [] Wound Image Taken    Wound Care Consulted? No    Attending Nurse:  Carla Coon LPN     Second RN/Staff Member:  Cecily MALONE RN

## 2023-04-07 NOTE — PLAN OF CARE
Problem: Occupational Therapy  Goal: Occupational Therapy Goal  Description: Goals to be met by: 4/21/23     Patient will increase functional independence with ADLs by performing:    UE Dressing with Modified [place].  LE Dressing with Modified [place].  Grooming while standing with Modified [place].  Toileting from toilet with Supervision for hygiene and clothing management.   Bathing from  shower chair/bench with Supervision.  Toilet transfer to toilet with Modified [place].    Outcome: Ongoing, Progressing

## 2023-04-07 NOTE — PT/OT/SLP EVAL
Physical Therapy Evaluation and Treatment    Patient Name: Ivet Crabtree   MRN: [id number]  Recent Surgery: * No surgery found *    Co-tx w/ OT 2/2 suspected pt complexity and requirement of 2 skilled therapists to assist in order to maximize pt treatment   Recommendations:     Discharge Recommendations: other (see comments)   Discharge Equipment Recommendations: none   Barriers to discharge: Increased level of assist    Assessment:     Ivet Crabtree is a 73 y.o. female admitted with a medical diagnosis of Stroke due to occlusion of right middle cerebral artery. She presents with the following impairments/functional limitations: impaired endurance, impaired self care skills, impaired functional mobility, gait instability, impaired balance, decreased safety awareness. Pt w/ good BLE strength and ROM, requiring CGA to walk 200' w/ no AD. Pt is currently mobilizing well enough to safely return home w/ family upon d/c but will benefit from continued treatment here and upon d/c to address the above listed impairments in order to progress back to PLOF.     Rehab Prognosis: Good; patient would benefit from acute skilled PT services to address these deficits and reach maximum level of function.  Recent Surgery: * No surgery found *      Plan:     During this hospitalization, patient to be seen 3 x/week to address the identified rehab impairments via gait training, therapeutic activities, therapeutic exercises, neuromuscular re-education and progress toward the following goals:    Plan of Care Expires: 04/30/23    Subjective     Chief Complaint: dec speed w/ movement  Patient/Family Comments/Goals: regain functional mobility  Pain/Comfort:  Pain Rating 1: 0/10  Pain Rating Post-Intervention 1: 0/10    Patients cultural, spiritual, Jew conflicts given the current situation: no    Social History:  Living Environment: Patient lives alone in a single story home.  Prior Level of Function: Prior to admission,  patient was independent with ADLs, working as a  on a special needs bus .  Equipment Used at Home: none    DME owned (not currently used): rolling walker, wheelchair, grab bars, and shower chair.  Assistance Upon Discharge: family    Objective:     Communicated with RN prior to session. Patient found HOB elevated with telemetry upon PT entry to room.    General Precautions: Standard, aspiration, fall   Orthopedic Precautions:    Braces: N/A  Respiratory Status: Room air    Exams:  Cognitive Exam: Patient is oriented to Person, Place, Time, Situation, follows commands 100% of the time  RLE ROM: WFL  RLE Strength: WFL  LLE ROM: WFL  LLE Strength: WFL  Sensation: Intact light touch to BLEs  Coordination: Impaired finger to nose     Functional Mobility:  Bed Mobility:  Supine to Sit: stand by assistance  Transfers:  Sit to Stand: stand by assistance with no AD  Bed to Chair: stand by assistance with no AD using Step Transfer  Toilet Transfer: contact guard assistance with no AD using Step Transfer  Gait:   Patient ambulated 200' with no AD and stand by assistance. Patient demonstrates occasional unsteady gait, decreased step length, wide base of support, flexed posture, and decreased kathleen. All lines remained intact throughout ambulation trial, gait belt utilized.  Balance:   Static Sitting: independence at EOB  Dynamic Sitting: supervision at EOB  Static Standing: supervision with no AD  Dynamic Standing: stand by assistance with no AD    Therapeutic Activities and Exercises:  Patient educated on role of acute care PT and PT POC, safety while in hospital including calling nurse for mobility, and call light usage  Patient educated about importance of OOB mobility  Gait training w/ cueing for postural control, safety, decreasing width of SUKHDEV, and upright posture  Toileting w/ CGA when leaning side to side to change brief and in standing when pulling up brief    Patient clear to ambulate in hallways with  RN/PCT.    AM-PAC 6 CLICK MOBILITY  Turning over in bed (including adjusting bedclothes, sheets and blankets)?: 4  Sitting down on and standing up from a chair with arms (e.g., wheelchair, bedside commode, etc.): 3  Moving from lying on back to sitting on the side of the bed?: 3  Moving to and from a bed to a chair (including a wheelchair)?: 3  Need to walk in hospital room?: 3  Climbing 3-5 steps with a railing?: 3  Basic Mobility Total Score: 19     Patient left up in chair with all lines intact, call button in reach, RN notified, and chair alarm on.    GOALS:   Multidisciplinary Problems       Physical Therapy Goals          Problem: Physical Therapy    Goal Priority Disciplines Outcome Goal Variances Interventions   Physical Therapy Goal     PT, PT/OT Ongoing, Progressing     Description: Goals to be met by: 23    Patient will increase functional independence with mobility by performin. Supine to sit with [place]  2. Sit to stand transfer with [place]  3. Bed to chair transfer with [place] using No Assistive Device  4. Gait  x 250 feet with [place] using No Assistive Device.                          History:     Past Medical History:   Diagnosis Date    Anxiety     Depression     Heart attack     12 years ago    History of heart attack     Hypertension     Stroke due to occlusion of right middle cerebral artery 2023       Past Surgical History:   Procedure Laterality Date     SECTION      EYE SURGERY Bilateral     CATARACT       Time Tracking:     PT Received On: 23  PT Start Time: 916  PT Stop Time: 946  PT Total Time (min): 30 min     Billable Minutes: Evaluation 5, Gait Training 15, and Therapeutic Activity 10    2023

## 2023-04-07 NOTE — PLAN OF CARE
Problem: Adjustment to Illness (Stroke, Hemorrhagic)  Goal: Optimal Coping  Outcome: Ongoing, Progressing     Problem: Adult Inpatient Plan of Care  Goal: Optimal Comfort and Wellbeing  4/7/2023 1207 by Erika Alvarenga RN  Outcome: Ongoing, Progressing  4/7/2023 1202 by Erika Alvarenga RN  Outcome: Ongoing, Progressing     POC reviewed with the patient and they verbalized understanding. Up in the chair . All comments and concerns addressed. Bed locked in lowest position with bed alarm set, call light within reach. Safety precautions maintained. VSS, see flowsheets.   Will continue to monitor for changes to POC and clinical condition.

## 2023-04-07 NOTE — PLAN OF CARE
PT Ita complete, appropriate goals created    Problem: Physical Therapy  Goal: Physical Therapy Goal  Description: Goals to be met by: 23    Patient will increase functional independence with mobility by performin. Supine to sit with [place]  2. Sit to stand transfer with [place]  3. Bed to chair transfer with [place] using No Assistive Device  4. Gait  x 250 feet with [place] using No Assistive Device.     Outcome: Ongoing, Progressing

## 2023-04-07 NOTE — PT/OT/SLP EVAL
Occupational Therapy   Co-Evaluation and Treatment    Patient Name: Ivet Crabtree   MRN: [id number]  Admit Date: 4/6/2023  Admitting Diagnosis: Stroke due to occlusion of right middle cerebral artery   Recent Surgery: * No surgery found *      Recommendations:     Discharge Recommendations: other (see comments) (HHOT)  Discharge Equipment Recommendations: none  Barriers to discharge: None    Assessment:     Ivet Crabtree is a 73 y.o. female with a medical diagnosis of Stroke due to occlusion of right middle cerebral artery. She presents with performance deficits affecting function including impaired endurance, impaired self care skills, impaired functional mobility, gait instability, decreased coordination, decreased upper extremity function, decreased lower extremity function. Pt was agreeable to OT eval and tolerated session well. Pt required stand-by assist w/ fxnl bed mobility, stand-by assist during functional transfers, contact-guard assist w/ functional mobility, and minimal assist w/ ADLs on this date as further detailed below. At this time, pt would benefit from skilled acute OT services to increase independence and safety w/ ADLs/IADLS, fxnl mobility, and fxnl tasks of choice so that pt may D/C to least restrictive environment w/ DEC risk of falls or readmissions.    Rehab Prognosis: Good; patient would benefit from acute skilled OT services to address these deficits and reach maximum level of function.     Plan:     Patient to be seen 3 x/week to address the above listed problems via self-care/home management, therapeutic activities, therapeutic exercises, neuromuscular re-education  Plan of Care Expires:    Plan of Care Reviewed with: patient    Subjective     Chief Complaint: Aphasia (74 yo female tob triage for aphasia and left facial droop. Daughter says mom was recently discharged from here on yesterday around 5pm for stroke symptoms that had resolved. They went to bed around 9 pm, she had a  "really bad HA around 1am, and awakened this morning around 6-6:30 am w/ slurred speech and facial droop. Pt has steady gait, no confusion noted. Denies numbness/tingling, blurred vision, dizziness, and extremity weakness. Took her daily morning meds this morning, including blood thinner.) and Facial Droop    Patient/Family Comments/Goals: "My left arm wasn't this bad off last time>"    Occupational Profile:  Living Environment: Patient lives alone in a H with 0 DERRICK and has a WIS (built in chair + grab bars) bathroom set up.  Prior Level of Function: Prior to admission, patient was independent with ADLs  Roles and Routines:   Hand Dominance: R hand  Work: orking as  for special needs students  Drive: yes  Managing Medicines/Managing Home: yes  Equipment Used at Home: rolling walker, standard walker, bedside commode, shower chair, transfer tub bench, and wheelchair  DME owned (not currently used): none  Upon discharge, patient will have assistance from  daughters lives close by that can (A) .    Pain/Comfort:  Pain Rating 1: 0/10    Objective:   Communicated with RN prior to session. Patient found supine upon OT entry to room, agreeable to evaluation.      General Precautions: Standard, aspiration, fall   Orthopedic Precautions:N/A   Braces: N/A   Respiratory Status:   Room air  Vitals: BP (!) 144/66 (BP Location: Right arm, Patient Position: Sitting)   Pulse 70   Temp 97.2 °F (36.2 °C) (Oral)   Resp 18   Ht 5' 6" (1.676 m)   Wt 101.5 kg (223 lb 13 oz)   SpO2 (!) 93%   BMI 36.12 kg/m²     Cognitive and Psychosocial Function:   AxOx4 -- Person, Place, Time, and Situation   Follows Commands/attention: follows one-step commands  Communication: dysarthria  Memory: No Deficits noted  Safety awareness/insight to disability: intact   Mood/Affect/Coping skills/emotional control: Appropriate to situation    Hearing: Intact    Vision: Intact visual fields    Physical Exam:  Postural examination/scapula " alignment:    -       No postural abnormalities identified  Skin integrity: Visible skin intact     Left UE Right UE   UE Edema absent absent   UE ROM AROM WFL AROM WFL   UE Strength 3+/5 5/5    Strength fair composite grasp grasp WFL   Sensation LUE INTACT:WFL RUE INTACT: WFL   Fine Motor Coordination:  LUE IMPAIRED: hand, finger to nose, manipulation of objects, graphomotor skills , and diadochokinesis skill RUE INTACT: WFL   Gross Motor Coordination: LUE IMPAIRED: dysmetria, drift, and hemiplegia/paresis RUE INTACT:WFL     Bed Mobility:   Supine to Sit with stand by assistance on R side of bed    Functional Mobility/Transfers:  Sit <> Stand Transfer with stand by assistance with no assistive device  Bed <> Chair Transfer using Step Transfer technique with stand by assistance with no assistive device  Toilet Transfer Step Transfer technique with stand by assistance with no AD  Functional Mobility: Pt engaged in functional mobility to simulate household/community distances with CGA x 1 with no AD  in order to maximize functional activity tolerance required for engagement in occupations of choice.     Activities of Daily Living:  Feeding: Supervision or Set-up Assistance   Grooming: Minimal Assitance to brush hair with LUE; supervision to brush hair with RUE. SBA and increased time required with washing hands at sink 2/2 bimanual tasks increasing difficulty.   UB Dressing: Maximum Assistance to drake and secure gown posteriorly  LB Dressing: Supervision or Set-up Assistance to drake b/l socks   Toileting: Minimal Assitance to pull up underwear from STS level from commode (pt able to thread BLE through leg holes and doff depends while seated EOB).     AMPAC 6 Click ADL:  AMPAC Total Score: 19    Treatment & Education:  Therapist provided facilitation and instruction of proper body mechanics, energy conservation, and fall prevention strategies during tasks listed above.  Pt educated on role of OT, POC and goals for  therapy  Pt educated on importance of OOB activities with staff member assistance and sitting OOB majority of the day.     Patient left up in chair with all lines intact, call button in reach, RN notified, and chair alarm on.    GOALS:   Multidisciplinary Problems       Occupational Therapy Goals          Problem: Occupational Therapy    Goal Priority Disciplines Outcome Interventions   Occupational Therapy Goal     OT, PT/OT Ongoing, Progressing    Description: Goals to be met by: 23     Patient will increase functional independence with ADLs by performing:    UE Dressing with Modified Nolan.  LE Dressing with Modified Nolan.  Grooming while standing with Modified Nolan.  Toileting from toilet with Supervision for hygiene and clothing management.   Bathing from  shower chair/bench with Supervision.  Toilet transfer to toilet with Modified Nolan.                         History:     Past Medical History:   Diagnosis Date    Anxiety     Depression     Heart attack     12 years ago    History of heart attack     Hypertension     Stroke due to occlusion of right middle cerebral artery 2023         Past Surgical History:   Procedure Laterality Date     SECTION      EYE SURGERY Bilateral     CATARACT       Time Tracking:     OT Date of Treatment:    OT Start Time: 915  OT Stop Time: 946  OT Total Time (min): 31 min  Additional staff present: N/A    Co-treatment performed for this visit due to patient need for two skilled therapists to ensure patient and staff safety and to accommodate for patient activity tolerance/pain management     Billable Minutes: Evaluation 8 Self Care/Home Management 2023

## 2023-04-07 NOTE — NURSING
[place] [facility]. Report called to NOY Garcia. Awaiting transport.

## 2023-04-07 NOTE — PT/OT/SLP EVAL
"Speech Language Pathology Evaluation  Bedside Swallow    Patient Name:  Ivet Crabtree   MRN:  [id number]  Admitting Diagnosis: Stroke due to occlusion of right middle cerebral artery    Recommendations:                 General Recommendations:  Cognitive-linguistic evaluation  Diet recommendations:  Regular, Thin   Aspiration Precautions: HOB to 90 degrees, Meds whole 1 at a time, and Standard aspiration precautions   General Precautions: Standard, aspiration, fall  Communication strategies:  none    History:     Past Medical History:   Diagnosis Date    Anxiety     Depression     Heart attack     12 years ago    History of heart attack     Hypertension     Stroke due to occlusion of right middle cerebral artery 2023       Past Surgical History:   Procedure Laterality Date     SECTION      EYE SURGERY Bilateral     CATARACT       Social History: Patient lives with self.        Prior diet: Regular with thin.        Subjective     "I am sleepy" per pt  Patient goals: home     Pain/Comfort:  Pain Rating 1: 0/10  Pain Rating Post-Intervention 1: 0/10    Respiratory Status: nasal cannula    Objective:     Oral Musculature Evaluation  Oral Musculature: WFL  Dentition: present and adequate  Secretion Management: adequate  Mucosal Quality: good  Mandibular Strength and Mobility: WFL  Oral Labial Strength and Mobility: WFL  Lingual Strength and Mobility: WFL  Velar Elevation: WFL  Buccal Strength and Mobility: WFL  Volitional Cough: strong  Volitional Swallow: no delay  Voice Prior to PO Intake: wfl    Bedside Swallow Eval:   Consistencies Assessed:  Thin liquids 4ounces of juice, self fed via straw  Solids 4 bites of eggs and 2 bites of sausage      Oral Phase:   WFL    Pharyngeal Phase:   no overt clinical signs/symptoms of aspiration  no overt clinical signs/symptoms of pharyngeal dysphagia    Compensatory Strategies  None    Treatment: Education provided re role of slp and rehab plan of " care    Assessment:     Ivet Crabtree is a 73 y.o. female with oral and pharyngeal phases of swallow wfl.    Goals:   Multidisciplinary Problems       SLP Goals          Problem: SLP    Goal Priority Disciplines Outcome   SLP Goal     SLP Ongoing, Progressing   Description: Goals due 4/14  1.  Tolerate regular diet with thin liquids with no s/s of aspiration  2.  Pt. Will participate in speech language cognitive evaluation                       Plan:     Patient to be seen:  3 x/week   Plan of Care expires:  05/05/23  Plan of Care reviewed with:  patient   SLP Follow-Up:  Yes       Discharge recommendations:  other (see comments)   Barriers to Discharge:  Decreased Care Giver Support    Time Tracking:     SLP Treatment Date:   04/07/23  Speech Start Time:  0800  Speech Stop Time:  0808     Speech Total Time (min):  8 min    Billable Minutes: Eval Swallow and Oral Function 8    04/07/2023

## 2023-04-07 NOTE — PLAN OF CARE
Regular diet with thin liquids recommended.    Problem: SLP  Goal: SLP Goal  Description: Goals due 4/14  1.  Tolerate regular diet with thin liquids with no s/s of aspiration  2.  Pt. Will participate in speech language cognitive evaluation  Outcome: Ongoing, Progressing

## 2023-04-07 NOTE — NURSING
Patient arrived to unit via stretcher from [facility] at 0220. AAOx4, transferred to bed with 3 assist. Bed in lowest position and call light in reach, bed alarm activated for safety purposes. Oriented to room, call light, bed alarm and unit protocol. Stroke Team called per OC. Passed her Alfredo. O2 at 2 LPM per N/C. Has deep loose np cough, denies having diabetes, headache, dizziness, COPD or pain. Has pure wick in place. SCD's intact awaiting pump. Non smoker.

## 2023-04-07 NOTE — PLAN OF CARE
Problem: Adult Inpatient Plan of Care  Goal: Plan of Care Review  Outcome: Ongoing, Progressing     Problem: Adult Inpatient Plan of Care  Goal: Patient-Specific Goal (Individualized)  Outcome: Ongoing, Progressing     Problem: Adult Inpatient Plan of Care  Goal: Optimal Comfort and Wellbeing  Outcome: Ongoing, Progressing     Problem: Adult Inpatient Plan of Care  Goal: Readiness for Transition of Care  Outcome: Ongoing, Progressing     Problem: Skin Injury Risk Increased  Goal: Skin Health and Integrity  Outcome: Ongoing, Progressing   Patient oriented to room and unit protocol. Is AAOx4, has chronic bronchitis with loose np cough noted. Remains on O2 as ordered. Has slight less sided weakness with some drift noted. Pure wick in place. SCD's on. Slight left sided facial droop, passed Alfredo. VSS. Skin intact

## 2023-04-07 NOTE — H&P
[facility] - Neurosurgery ([facility])  Vascular Neurology  Comprehensive Stroke Center  History & Physical    Consults  Assessment/Plan:     Patient is a 73 y.o. year old female with:    * Stroke due to occlusion of right middle cerebral artery  72 y/o female with R MCA stroke that has extended and new infarcts Etiology cardioembolic    Antithrombotics: DAPT  Statin: Lipitor 40 mg daily  Therapy: PT/OT/ST/  Diagnostics: None  Risk factor modification: HTN, obesity, CAD, DM  VTE: Lovenox 40 mg sc daily, SCD's  SBP <180        Diabetes mellitus type 2 with neurological manifestations  Stroke risk factor  HgBA1C 7.2  SSI    Essential hypertension  Stroke risk factor  SBP <180    COPD (chronic obstructive pulmonary disease)  On breathing TX Q6H  Mucinex Q12H  Acapella        STROKE DOCUMENTATION     Acute Stroke Times   Last Known Normal Date: 04/05/23  Last Known Normal Time: 2100  Symptom Onset Date: 04/05/23  Symptom Onset Time: 0630  Stroke Team Called Date: 04/06/23  Stroke Team Called Time: 0739  Stroke Team Arrival Date: 04/06/23  Stroke Team Arrival Time: 0742  CT Interpretation Time: 0745  Thrombolytic Therapy Recommended: No  Thrombectomy Recommended: No    NIH Scale:  1a. Level of Consciousness: 0-->Alert, keenly responsive  1b. LOC Questions: 0-->Answers both questions correctly  1c. LOC Commands: 0-->Performs both tasks correctly  2. Best Gaze: 0-->Normal  3. Visual: 0-->No visual loss  4. Facial Palsy: 1-->Minor paralysis (flattened nasolabial fold, asymmetry on smiling)  5a. Motor Arm, Left: 1-->Drift, limb holds 90 (or 45) degrees, but drifts down before full 10 seconds, does not hit bed or other support  5b. Motor Arm, Right: 0-->No drift, limb holds 90 (or 45) degrees for full 10 secs  6a. Motor Leg, Left: 0-->No drift, leg holds 30 degree position for full 5 secs  6b. Motor Leg, Right: 0-->No drift, leg holds 30 degree position for full 5 secs  7. Limb Ataxia: 0-->Absent  8. Sensory: 0-->Normal, no  sensory loss  9. Best Language: 0-->No aphasia, normal  10. Dysarthria: 1-->Mild-to-moderate dysarthria, patient slurs at least some words and, at worst, can be understood with some difficulty  11. Extinction and Inattention (formerly Neglect): 0-->No abnormality  Total (NIH Stroke Scale): 3     Modified [place] Score: 0  [place] Coma Scale:15   ABCD2 Score:    [id number] Score:   HAS -BLED Score:   ICH Score:   Hunt & Stokes Classification:      Thrombolysis Candidate? No, Out of window - Symptom onset > 4.5 hours    Delays to Thrombolysis?  Not Applicable    Interventional Revascularization Candidate?   Is the patient eligible for mechanical endovascular reperfusion (ASHA)?  No; No large vessel occlusion identified on imaging     Delays to Thrombectomy? Not Applicable    Hemorrhagic change of an Ischemic Stroke: Does this patient have an ischemic stroke with hemorrhagic changes? No         Subjective:     History of Present Illness:  Ms. Luque is a 73-year-old female who was admitted to the hospital with slurred speech and left facial droop.  Initial CT unrevealing however MRI of the brain revealed an acute infarction in the area of right subinsular cortex and right centrum semiovale.  Also noted old lacunar infarcts in the left dennis.  Symptoms still present with increased left pronator drift.  Consult to neurology was placed.  CTA was ordered and noted evolution of stroke with no evidence of hemorrhage or shifting.  Telemetry reviewed throughout stay and noted to be in normal sinus rhythm without any evidence of atrial fibrillation or atrial flutter.  After reviewing imaging, neurology felt this was cardioembolic in nature and Cardiology was consulted.  Echo with bubble study did not reveal any evidence of shunt.  Recommendations including outpatient Holter monitor versus implanted loop recorder.  She remained on aspirin and Plavix and statin throughout the hospitalization.  On day of discharge 04/06, most of  patient's symptoms had resolved and she was feeling well.  I had a long discussion with patient and her daughter about medications, no driving and referral placed a PMR.  We also discussed following up with her cardiologist for continued evaluation of possible arrhythmias.  They both expressed understanding and prescriptions were sent for patient.  Patient discharged home in stable condition.     All symptoms had abated by time of discharge, patient went home and ate a big dinner and felt well. Awoke the next morning around 1 a.m. with a really bad headache, went back to sleep awoke again at 6:00 a.m. with slurred speech and a facial droop.  No confusion this time and steady gait, all limbs working appropriately.    She presented to the ED again at [facility] for dysarthria, facial droop and L UE weakness . Vascular neurology consulted in the ED.  Repeat MRI showed new strokes.MRI showed new signals in right frontal operculum compatible with acute to early subacute ischemia, these findings are in close proximity to previously identified additional evolving early subacute infarcts elsewhere in the frontal lobe, insula, and frontal parietal white matter. Additionally, there is a questionable new punctate area of restricted diffusion the right inferior jugular white matter at the level of the atrium of the lateral ventricle. And Questionable increased FLAIR signal within the M2 branches of the right MCA within the sylvian fissure. She did take her ASA, Plavix and Statin were taken this am.   Patient was admitted to ICU due to drowsiness due to benzo's given for imaging.     Patient was transferred to [facility] for further evaluation.   She is going to need a 30 day event monitor on discharge.   Upon examination patient has drift to LUE and facial droop with dysarthria has NIHSS 3                                 Woke up with symptoms?: yes    Recent bleeding noted: yes     Does the patient take any Blood  "Thinners? no     Medications: Antiplatelets:  aspirin and clopidogrel/Plavix    Past Medical History: hypertension, hyperlipidemia, MI/CAD and stroke    Past Surgical History: no relevant surgical history    Family History: no relevant history    Social History: no smoking, no drinking, no drugs    Allergies: Talwin [Pentazocine Lactate]  Ace Inhibitors     Review of Systems   Constitutional:  Negative for chills and fever.   HENT:  Negative for drooling and ear discharge.    Eyes:  Negative for pain and itching.   Respiratory:  Positive for cough and shortness of breath.    Cardiovascular:  Negative for chest pain and leg swelling.   Gastrointestinal:  Negative for abdominal distention and abdominal pain.   Endocrine: Negative for heat intolerance and polydipsia.   Genitourinary:  Negative for difficulty urinating and dyspareunia.   Musculoskeletal:  Positive for arthralgias and back pain.   Skin:  Negative for rash and wound.   Allergic/Immunologic: Negative for immunocompromised state.   Neurological:  Positive for facial asymmetry, speech difficulty and weakness.   Psychiatric/Behavioral:  Negative for agitation and confusion.     The following systems were reviewed with pertinent positives and negatives documented in the HPI: Constitutional, Eyes, CV, Respiratory, GI, , Musculoskeletal, Skin, Neurological, Psychiatric      Objective:   Vitals: Blood pressure (!) 192/113, pulse (!) 58, temperature 98.3 °F (36.8 °C), temperature source Oral, resp. rate (!) 38, height 5' 6" (1.676 m), weight 101.2 kg (223 lb), SpO2 98 %.     CT READ: Yes  Abnormal CT Evolving infarctions in the right MCA territory.     Physical Exam  Vitals and nursing note reviewed.   Constitutional:       Appearance: Normal appearance. She is obese.   HENT:      Head: Normocephalic and atraumatic.   Eyes:      General: Lids are normal.      Extraocular Movements: Extraocular movements intact.      Conjunctiva/sclera: Conjunctivae normal.      " Pupils: Pupils are equal, round, and reactive to light.   Cardiovascular:      Rate and Rhythm: Normal rate and regular rhythm.   Pulmonary:      Effort: Pulmonary effort is normal.      Breath sounds: Normal breath sounds.      Comments: Coarse breath sounds oniel  Abdominal:      General: Abdomen is flat. Bowel sounds are normal.      Palpations: Abdomen is soft.   Musculoskeletal:         General: Normal range of motion.      Cervical back: Normal range of motion.   Skin:     General: Skin is warm and dry.   Neurological:      Mental Status: She is alert and oriented to person, place, and time.      Motor: Weakness present.      Comments: Left facial weakness, dysarthria, LUE drift,    Psychiatric:         Mood and Affect: Mood normal.         Behavior: Behavior normal.               Betzaida Garsia NP  [facility]  Department of Vascular Neurology   [facility])

## 2023-04-08 LAB — POCT GLUCOSE: 171 MG/DL (ref 70–110)

## 2023-04-08 PROCEDURE — 99233 SBSQ HOSP IP/OBS HIGH 50: CPT | Mod: ,,, | Performed by: STUDENT IN AN ORGANIZED HEALTH CARE EDUCATION/TRAINING PROGRAM

## 2023-04-08 PROCEDURE — 93010 ELECTROCARDIOGRAM REPORT: CPT | Mod: ,,, | Performed by: INTERNAL MEDICINE

## 2023-04-08 PROCEDURE — 93005 ELECTROCARDIOGRAM TRACING: CPT

## 2023-04-08 PROCEDURE — 25000003 PHARM REV CODE 250: Performed by: STUDENT IN AN ORGANIZED HEALTH CARE EDUCATION/TRAINING PROGRAM

## 2023-04-08 PROCEDURE — 27000221 HC OXYGEN, UP TO 24 HOURS

## 2023-04-08 PROCEDURE — G0378 HOSPITAL OBSERVATION PER HR: HCPCS

## 2023-04-08 PROCEDURE — 11000001 HC ACUTE MED/SURG PRIVATE ROOM

## 2023-04-08 PROCEDURE — 25000003 PHARM REV CODE 250: Performed by: NURSE PRACTITIONER

## 2023-04-08 PROCEDURE — 94664 DEMO&/EVAL PT USE INHALER: CPT | Mod: XB

## 2023-04-08 PROCEDURE — 27000646 HC AEROBIKA DEVICE

## 2023-04-08 PROCEDURE — 96375 TX/PRO/DX INJ NEW DRUG ADDON: CPT

## 2023-04-08 PROCEDURE — 93010 EKG 12-LEAD: ICD-10-PCS | Mod: ,,, | Performed by: INTERNAL MEDICINE

## 2023-04-08 PROCEDURE — 25000242 PHARM REV CODE 250 ALT 637 W/ HCPCS: Performed by: NURSE PRACTITIONER

## 2023-04-08 PROCEDURE — 99233 PR SUBSEQUENT HOSPITAL CARE,LEVL III: ICD-10-PCS | Mod: ,,, | Performed by: STUDENT IN AN ORGANIZED HEALTH CARE EDUCATION/TRAINING PROGRAM

## 2023-04-08 PROCEDURE — 94761 N-INVAS EAR/PLS OXIMETRY MLT: CPT

## 2023-04-08 PROCEDURE — 99900035 HC TECH TIME PER 15 MIN (STAT)

## 2023-04-08 PROCEDURE — 94640 AIRWAY INHALATION TREATMENT: CPT

## 2023-04-08 RX ORDER — LABETALOL HCL 20 MG/4 ML
10 SYRINGE (ML) INTRAVENOUS EVERY 6 HOURS PRN
Status: DISCONTINUED | OUTPATIENT
Start: 2023-04-08 | End: 2023-04-10 | Stop reason: HOSPADM

## 2023-04-08 RX ORDER — METOPROLOL TARTRATE 25 MG/1
25 TABLET, FILM COATED ORAL 2 TIMES DAILY
Status: DISCONTINUED | OUTPATIENT
Start: 2023-04-08 | End: 2023-04-08

## 2023-04-08 RX ORDER — METOPROLOL TARTRATE 50 MG/1
50 TABLET ORAL 2 TIMES DAILY
Status: DISCONTINUED | OUTPATIENT
Start: 2023-04-08 | End: 2023-04-10 | Stop reason: HOSPADM

## 2023-04-08 RX ORDER — METOPROLOL TARTRATE 1 MG/ML
5 INJECTION, SOLUTION INTRAVENOUS EVERY 5 MIN PRN
Status: DISCONTINUED | OUTPATIENT
Start: 2023-04-08 | End: 2023-04-10 | Stop reason: HOSPADM

## 2023-04-08 RX ADMIN — CLOPIDOGREL BISULFATE 75 MG: 75 TABLET ORAL at 07:04

## 2023-04-08 RX ADMIN — ATORVASTATIN CALCIUM 80 MG: 40 TABLET, FILM COATED ORAL at 07:04

## 2023-04-08 RX ADMIN — IPRATROPIUM BROMIDE AND ALBUTEROL SULFATE 3 ML: 2.5; .5 SOLUTION RESPIRATORY (INHALATION) at 12:04

## 2023-04-08 RX ADMIN — MUPIROCIN: 20 OINTMENT TOPICAL at 08:04

## 2023-04-08 RX ADMIN — METOROPROLOL TARTRATE 5 MG: 5 INJECTION, SOLUTION INTRAVENOUS at 08:04

## 2023-04-08 RX ADMIN — METOPROLOL TARTRATE 50 MG: 50 TABLET, FILM COATED ORAL at 08:04

## 2023-04-08 RX ADMIN — IPRATROPIUM BROMIDE AND ALBUTEROL SULFATE 3 ML: 2.5; .5 SOLUTION RESPIRATORY (INHALATION) at 07:04

## 2023-04-08 RX ADMIN — IPRATROPIUM BROMIDE AND ALBUTEROL SULFATE 3 ML: 2.5; .5 SOLUTION RESPIRATORY (INHALATION) at 01:04

## 2023-04-08 RX ADMIN — ASPIRIN 81 MG: 81 TABLET, COATED ORAL at 07:04

## 2023-04-08 RX ADMIN — Medication 6 MG: at 08:04

## 2023-04-08 RX ADMIN — GUAIFENESIN 600 MG: 600 TABLET, EXTENDED RELEASE ORAL at 08:04

## 2023-04-08 RX ADMIN — VENLAFAXINE HYDROCHLORIDE 75 MG: 37.5 CAPSULE, EXTENDED RELEASE ORAL at 03:04

## 2023-04-08 RX ADMIN — PANTOPRAZOLE SODIUM 40 MG: 40 TABLET, DELAYED RELEASE ORAL at 03:04

## 2023-04-08 RX ADMIN — POLYETHYLENE GLYCOL 3350 17 G: 17 POWDER, FOR SOLUTION ORAL at 07:04

## 2023-04-08 RX ADMIN — GUAIFENESIN 600 MG: 600 TABLET, EXTENDED RELEASE ORAL at 07:04

## 2023-04-08 NOTE — PLAN OF CARE
Problem: Tissue Perfusion Altered  Goal: Improved Tissue Perfusion  Outcome: Ongoing, Progressing     Problem: Adjustment to Illness (Stroke, Hemorrhagic)  Goal: Optimal Coping  Outcome: Ongoing, Progressing     POC reviewed with the patient and they verbalized understanding. Resumed metoprolol. Vss All comments and concerns addressed. Bed locked in lowest position with bed alarm set, call light within reach. Safety precautions maintained. VSS, see flowsheets.  Will continue to monitor for changes to POC and clinical condition.

## 2023-04-08 NOTE — HOSPITAL COURSE
Ms. Ivet Crabtree is a 73 y.o. female with PMH of HTN, HLD, CAD, recent R MCA stroke due to R M2 stenosis (4/3/23) admitted for R MCA stroke that has extended in addition to areas of new R MCA stroke. She presented to OSH with aphasia and LFD following discharge for recent R MCA stroke. No acute intervention with thrombolytics/EVT recommended. Repeat CTA 4/6 with stable high grade stenosis of R M2. Etiology at this time felt more likely FREDERICK/watershed based on pattern of infarct seen on MRI, of note patient did have episode of afib RVR however cardioembolic due to afib felt less likely as etiology at this time. During admission, PT/OT/SLP services consulted for evaluation and recommend home with  PT/OT/SLP. Patient stable for discharge.    Please see below for all appropriate medication changes, imaging results, and necessary follow-up.

## 2023-04-08 NOTE — ASSESSMENT & PLAN NOTE
74 y/o female with R MCA stroke that has extended and new infarcts Etiology cardioembolic    4/8: Patient went into Afib W RVR in the AM. Resolved after two doses of metoprolol 5 iv and now back on her home dose of oral 50 bid. No changes in exam during episode. Will transition from DAPT to eliquis once we speak to the patient's daughter     Antithrombotics: DAPT  Statin: Lipitor 40 mg daily  Therapy: PT/OT/ST/  Diagnostics: None  Risk factor modification: HTN, obesity, CAD, DM  VTE: Lovenox 40 mg sc daily, SCD's  SBP <180

## 2023-04-08 NOTE — CODE/ RAPID DOCUMENTATION
RAPID RESPONSE NURSE NOTE        Admit Date: 2023  LOS: 2  Code Status: Full Code   Date of Consult: 2023  : 1950  Age: 73 y.o.  Weight:   Wt Readings from Last 1 Encounters:   23 101.5 kg (223 lb 13 oz)     Sex: female  Race: White   Bed: [address] [place] [address] A:   MRN: [id number]  Time Rapid Response Team page Received: 08   Time Rapid Response Team at Bedside: 805  Time Rapid Response Team left Bedside: 820  Was the patient discharged from an ICU this admission? Yes   Was the patient discharged from a PACU within last 24 hours? No   Did the patient receive conscious sedation/general anesthesia in last 24 hours? No  Was the patient in the ED within the past 24 hours? No  Was the patient on NIPPV within the past 24 hours? No   Did this progress into an ARC or CPA: no  Attending Physician: Maria Elena Mir MD  Primary Service: Neurology       SITUATION    Notified by charge RN via phone call.  Reason for alert: A-fib w/ RVR  Called to evaluate the patient for Dysrythmia    BACKGROUND     Why is the patient in the hospital?: Stroke due to occlusion of right middle cerebral artery    Patient has a past medical history of Anxiety, Depression, Heart attack, History of heart attack, Hypertension, and Stroke due to occlusion of right middle cerebral artery.    Last Vitals:  Temp: 96.7 °F (35.9 °C) (827)  Pulse: 98 (827)  Resp: 16 (827)  BP: 176/81 (827)  SpO2: 95 % (827)    24 Hours Vitals Range:  Temp:  [96.7 °F (35.9 °C)-98.6 °F (37 °C)]   Pulse:  []   Resp:  [16-20]   BP: (108-199)/()   SpO2:  [92 %-98 %]     Labs:  Recent Labs     23  0823  0810 04/07/23  09   WBC 14.26*  --  13.00*   HGB 15.3  --  14.6   HCT 46.4 47 45.2     --  307       Recent Labs     23  08      K 3.9   *   CO2 18*   CREATININE 0.8   *        No results for input(s): PH, PCO2, PO2, HCO3, POCSATURATED, BE in the last 72 hours.      ASSESSMENT    Physical Exam  Vitals and nursing note reviewed.   Cardiovascular:      Rate and Rhythm: Tachycardia present. Rhythm irregular.   Pulmonary:      Breath sounds: Normal breath sounds.   Neurological:      Mental Status: She is alert and oriented to person, place, and time.   Psychiatric:         Mood and Affect: Mood normal.         Behavior: Behavior normal.         Thought Content: Thought content normal.         Judgment: Judgment normal.       INTERVENTIONS    The patient was seen for Cardiac problem. Staff concerns included tachycardia. The following interventions were performed: EKG and Lopressor 5mg Iv push x2.    RECOMMENDATIONS    We recommend:   -Restart home lopressor  -maintain Iv access  -continue tele and pulse ox  -Maintain Hr< 120       PROVIDER ESCALATION    Orders received and case discussed with Dr. Chamberlain .    Primary team arrival time:     Disposition: Remain in room 923.    FOLLOW UP    charge Anastacia VILLAFUERTE  updated on plan of care. Instructed to call the Rapid Response Nurse, Meek Flores RN at [phone] for additional questions or concerns.

## 2023-04-08 NOTE — PLAN OF CARE
Problem: Adult Inpatient Plan of Care  Goal: Plan of Care Review  Outcome: Ongoing, Progressing     Patient is AAO x4. POC reviewed with patient. Patient verbalized understanding. Patient's breathing is unlabored with equal chest expansion. Patient had a nose bleed around midnight;VSS;bleeding controlled with gauze to left nare. Patient has left facial droop. Patient voids per purewick at night but pt can walk with assistance x1. Patient remained free from falls. Patient rested well through shift. Bed in lowest position,bed alarm on, side rails up x3, no complaints or signs of distress. WCTM during shift.  See flowsheets for full assessment and VS info.

## 2023-04-08 NOTE — SUBJECTIVE & OBJECTIVE
Neurologic Chief Complaint: R MCA stroke     Subjective:     Interval History: Patient is seen for follow-up neurological assessment and treatment recommendations: Patient went into Afib W RVR in the AM. Resolved after two doses of metoprolol 5 iv and now back on her home dose of oral 50 bid. No changes in exam during episode. Will transition from DAPT to eliquis once we speak to the patient's daughter     HPI, Past Medical, Family, and Social History remains the same as documented in the initial encounter.     Review of Systems   Constitutional:  Negative for chills and fever.   HENT:  Negative for drooling and ear discharge.    Eyes:  Negative for pain and itching.   Respiratory:  Negative for cough and shortness of breath.    Cardiovascular:  Negative for chest pain and leg swelling.   Gastrointestinal:  Negative for abdominal distention and abdominal pain.   Endocrine: Negative for heat intolerance and polydipsia.   Genitourinary:  Negative for difficulty urinating and dyspareunia.   Musculoskeletal:  Positive for arthralgias and back pain.   Skin:  Negative for rash and wound.   Allergic/Immunologic: Negative for immunocompromised state.   Neurological:  Positive for facial asymmetry, speech difficulty and weakness.   Psychiatric/Behavioral:  Negative for agitation and confusion.    Scheduled Meds:   albuterol-ipratropium  3 mL Nebulization Q6H    aspirin  81 mg Oral Daily    atorvastatin  80 mg Oral Daily    clopidogreL  75 mg Oral Daily    guaiFENesin  600 mg Oral BID    melatonin  6 mg Oral Nightly    metoprolol tartrate  50 mg Oral BID    mupirocin   Nasal BID    pantoprazole  40 mg Oral Daily    venlafaxine  75 mg Oral Daily     Continuous Infusions:  PRN Meds:acetaminophen, dextrose 10%, dextrose 10%, dextrose 10%, dextrose 10%, dextrose, dextrose, dextrose 50%, dextrose 50%, glucagon (human recombinant), glucose, glucose, insulin aspart U-100, labetalol, metoprolol, naloxone, polyethylene  glycol    Objective:     Vital Signs (Most Recent):  Temp: 96.7 °F (35.9 °C) (04/08/23 1003)  Pulse: 61 (04/08/23 1125)  Resp: 16 (04/08/23 1003)  BP: 139/71 (04/08/23 1003)  SpO2: 95 % (04/08/23 1003)  BP Location: Right arm    Vital Signs Range (Last 24H):  Temp:  [96.7 °F (35.9 °C)-98.6 °F (37 °C)]   Pulse:  []   Resp:  [16-20]   BP: (108-199)/()   SpO2:  [92 %-98 %]   BP Location: Right arm    Physical Exam  Vitals and nursing note reviewed.   Constitutional:       General: She is not in acute distress.     Appearance: She is well-developed. She is obese. She is not diaphoretic.   HENT:      Head: Normocephalic and atraumatic.   Eyes:      General: No scleral icterus.     Pupils: Pupils are equal, round, and reactive to light.   Neck:      Thyroid: No thyromegaly.   Cardiovascular:      Rate and Rhythm: Normal rate and regular rhythm.      Heart sounds: No murmur heard.  Pulmonary:      Effort: Pulmonary effort is normal.      Breath sounds: Normal breath sounds. No stridor. No wheezing or rales.   Abdominal:      General: There is no distension.      Palpations: Abdomen is soft. There is no mass.      Tenderness: There is no abdominal tenderness. There is no guarding.   Musculoskeletal:         General: No swelling or deformity. Normal range of motion.      Cervical back: Normal range of motion and neck supple.   Skin:     General: Skin is warm and dry.      Capillary Refill: Capillary refill takes less than 2 seconds.      Coloration: Skin is not jaundiced.      Findings: No bruising.       Neurological Exam:   LOC: alert  Attention Span: Good   Language: No aphasia  Articulation: Dysarthria  Orientation: Person, Place, Time   Visual Fields: Full  EOM (CN III, IV, VI): Full/intact  Pupils (CN II, III): PERRL  Facial Sensation (CN V): Normal  Facial Movement (CN VII): Lower facial weakness on the Left  Reflexes: 2+ throughout  Motor: Arm left  Paresis: 4/5  Leg left  Normal 5/5  Arm right  Normal  5/5  Leg right Normal 5/5  Cerebellum: No evidence of appendicular or axial ataxia  Sensation: Intact to light touch, temperature and vibration  Tone: Normal tone throughout    Laboratory:  BMP:   Recent Labs   Lab 04/06/23  0804      K 3.9   *   CO2 18*   BUN 20   CREATININE 0.8   CALCIUM 8.8     CBC:   Recent Labs   Lab 04/07/23  0917   WBC 13.00*   RBC 4.86   HGB 14.6   HCT 45.2      MCV 93   MCH 30.0   MCHC 32.3     Lipid Panel:   Recent Labs   Lab 04/06/23  0804   CHOL 130   LDLCALC 55.0*   HDL 51   TRIG 120     Coagulation:   Recent Labs   Lab 04/04/23 0449 04/06/23  0804   INR 1.1 1.1   APTT 24.4  --      Hgb A1C:   Recent Labs   Lab 04/04/23 0449   HGBA1C 7.2*     TSH:   Recent Labs   Lab 04/06/23  0804   TSH 3.775       Diagnostic Results     Brain Imaging     MRI brain 4/6  Diffusion restriction in the right subinsular cortex and in the right centrum semiovale, consistent with acute infarctions.     String-like fashion involvement in the right centrum semiovale may represent watershed infarctions.  No evidence of hemorrhagic conversion.    CT head 4/3:   No acute intracranial abnormality detected at this time..  Mild chronic small vessel ischemic changes.  If persistent neurological symptoms, recommend MRI of the brain with diffusion-weighted sequencing    Vessel Imaging   CTA head and neck 4/7:  CTA head: Stable focal high-grade stenosis or short segment occlusion right proximal M2 MCA.     No evidence for significant proximal high-grade stenosis or occlusion.     CTA neck: Noncalcified plaquing of the carotid bifurcations and proximal ICAs with less than 50% proximal ICA stenosis by NASCET criteria.     CT head: Developing hypoattenuation within the right MCA territory compatible with evolving region of infarction seen on MRI.  No significant mass effect or hemorrhagic conversion     Cardiac Imaging   TTE 4/4:  The left ventricle is normal in size with mild concentric hypertrophy and  normal systolic function.  The estimated ejection fraction is 70%.  Normal right ventricular size with normal right ventricular systolic function.  The estimated PA systolic pressure is 34 mmHg.  No intracardiac source of embolus noted on this exam. If high clinical suspicion, consider SAI +/- bubble study

## 2023-04-08 NOTE — PROGRESS NOTES
[facility] - Neurosurgery ([facility])  Vascular Neurology  Comprehensive Stroke Center  Progress Note    Assessment/Plan:     * Stroke due to occlusion of right middle cerebral artery  74 y/o female with R MCA stroke that has extended and new infarcts Etiology cardioembolic    4/8: Patient went into Afib W RVR in the AM. Resolved after two doses of metoprolol 5 iv and now back on her home dose of oral 50 bid. No changes in exam during episode. Will transition from DAPT to eliquis once we speak to the patient's daughter     Antithrombotics: DAPT  Statin: Lipitor 40 mg daily  Therapy: PT/OT/ST/  Diagnostics: None  Risk factor modification: HTN, obesity, CAD, DM  VTE: Lovenox 40 mg sc daily, SCD's  SBP <180        Diabetes mellitus type 2 with neurological manifestations  Stroke risk factor  HgBA1C 7.2  SSI    COPD (chronic obstructive pulmonary disease)  On breathing TX Q6H  Mucinex Q12H  Acapella    Essential hypertension  Stroke risk factor  SBP <180         4/8: Patient went into Afib W RVR in the AM. Resolved after two doses of metoprolol 5 iv and now back on her home dose of oral 50 bid. No changes in exam during episode. Will transition from DAPT to eliquis once we speak to the patient's daughter       STROKE DOCUMENTATION   Acute Stroke Times   Last Known Normal Date: 04/05/23  Last Known Normal Time: 2100  Symptom Onset Date: 04/05/23  Symptom Onset Time: 0630  Stroke Team Called Date: 04/06/23  Stroke Team Called Time: 0739  Stroke Team Arrival Date: 04/06/23  Stroke Team Arrival Time: 0742  CT Interpretation Time: 0745  Thrombolytic Therapy Recommended: No  Thrombectomy Recommended: No    NIH Scale:    1a. Level of Consciousness: 0-->Alert, keenly responsive  1b. LOC Questions: 0-->Answers both questions correctly  1c. LOC Commands: 0-->Performs both tasks correctly  2. Best Gaze: 0-->Normal  3. Visual: 0-->No visual loss  4. Facial Palsy: 1-->Minor paralysis (flattened nasolabial fold, asymmetry on  smiling)  5a. Motor Arm, Left: 1-->Drift, limb holds 90 (or 45) degrees, but drifts down before full 10 seconds, does not hit bed or other support  5b. Motor Arm, Right: 0-->No drift, limb holds 90 (or 45) degrees for full 10 secs  6a. Motor Leg, Left: 0-->No drift, leg holds 30 degree position for full 5 secs  6b. Motor Leg, Right: 0-->No drift, leg holds 30 degree position for full 5 secs  7. Limb Ataxia: 0-->Absent  8. Sensory: 0-->Normal, no sensory loss  9. Best Language: 0-->No aphasia, normal  10. Dysarthria: 1-->Mild-to-moderate dysarthria, patient slurs at least some words and, at worst, can be understood with some difficulty  11. Extinction and Inattention (formerly Neglect): 0-->No abnormality  Total (NIH Stroke Scale): 3       Modified [place] Score: 0  Kali Coma Scale:    ABCD2 Score:    [id number] Score:   HAS -BLED Score:   ICH Score:   Hunt & Stokes Classification:      Hemorrhagic change of an Ischemic Stroke: Does this patient have an ischemic stroke with hemorrhagic changes? No     Neurologic Chief Complaint: R MCA stroke     Subjective:     Interval History: Patient is seen for follow-up neurological assessment and treatment recommendations: Patient went into Afib W RVR in the AM. Resolved after two doses of metoprolol 5 iv and now back on her home dose of oral 50 bid. No changes in exam during episode. Will transition from DAPT to eliquis once we speak to the patient's daughter     HPI, Past Medical, Family, and Social History remains the same as documented in the initial encounter.     Review of Systems   Constitutional:  Negative for chills and fever.   HENT:  Negative for drooling and ear discharge.    Eyes:  Negative for pain and itching.   Respiratory:  Negative for cough and shortness of breath.    Cardiovascular:  Negative for chest pain and leg swelling.   Gastrointestinal:  Negative for abdominal distention and abdominal pain.   Endocrine: Negative for heat intolerance and polydipsia.    Genitourinary:  Negative for difficulty urinating and dyspareunia.   Musculoskeletal:  Positive for arthralgias and back pain.   Skin:  Negative for rash and wound.   Allergic/Immunologic: Negative for immunocompromised state.   Neurological:  Positive for facial asymmetry, speech difficulty and weakness.   Psychiatric/Behavioral:  Negative for agitation and confusion.    Scheduled Meds:   albuterol-ipratropium  3 mL Nebulization Q6H    aspirin  81 mg Oral Daily    atorvastatin  80 mg Oral Daily    clopidogreL  75 mg Oral Daily    guaiFENesin  600 mg Oral BID    melatonin  6 mg Oral Nightly    metoprolol tartrate  50 mg Oral BID    mupirocin   Nasal BID    pantoprazole  40 mg Oral Daily    venlafaxine  75 mg Oral Daily     Continuous Infusions:  PRN Meds:acetaminophen, dextrose 10%, dextrose 10%, dextrose 10%, dextrose 10%, dextrose, dextrose, dextrose 50%, dextrose 50%, glucagon (human recombinant), glucose, glucose, insulin aspart U-100, labetalol, metoprolol, naloxone, polyethylene glycol    Objective:     Vital Signs (Most Recent):  Temp: 96.7 °F (35.9 °C) (04/08/23 1003)  Pulse: 61 (04/08/23 1125)  Resp: 16 (04/08/23 1003)  BP: 139/71 (04/08/23 1003)  SpO2: 95 % (04/08/23 1003)  BP Location: Right arm    Vital Signs Range (Last 24H):  Temp:  [96.7 °F (35.9 °C)-98.6 °F (37 °C)]   Pulse:  []   Resp:  [16-20]   BP: (108-199)/()   SpO2:  [92 %-98 %]   BP Location: Right arm    Physical Exam  Vitals and nursing note reviewed.   Constitutional:       General: She is not in acute distress.     Appearance: She is well-developed. She is obese. She is not diaphoretic.   HENT:      Head: Normocephalic and atraumatic.   Eyes:      General: No scleral icterus.     Pupils: Pupils are equal, round, and reactive to light.   Neck:      Thyroid: No thyromegaly.   Cardiovascular:      Rate and Rhythm: Normal rate and regular rhythm.      Heart sounds: No murmur heard.  Pulmonary:      Effort: Pulmonary  effort is normal.      Breath sounds: Normal breath sounds. No stridor. No wheezing or rales.   Abdominal:      General: There is no distension.      Palpations: Abdomen is soft. There is no mass.      Tenderness: There is no abdominal tenderness. There is no guarding.   Musculoskeletal:         General: No swelling or deformity. Normal range of motion.      Cervical back: Normal range of motion and neck supple.   Skin:     General: Skin is warm and dry.      Capillary Refill: Capillary refill takes less than 2 seconds.      Coloration: Skin is not jaundiced.      Findings: No bruising.       Neurological Exam:   LOC: alert  Attention Span: Good   Language: No aphasia  Articulation: Dysarthria  Orientation: Person, Place, Time   Visual Fields: Full  EOM (CN III, IV, VI): Full/intact  Pupils (CN II, III): PERRL  Facial Sensation (CN V): Normal  Facial Movement (CN VII): Lower facial weakness on the Left  Reflexes: 2+ throughout  Motor: Arm left  Paresis: 4/5  Leg left  Normal 5/5  Arm right  Normal 5/5  Leg right Normal 5/5  Cerebellum: No evidence of appendicular or axial ataxia  Sensation: Intact to light touch, temperature and vibration  Tone: Normal tone throughout    Laboratory:  BMP:   Recent Labs   Lab 04/06/23  0804      K 3.9   *   CO2 18*   BUN 20   CREATININE 0.8   CALCIUM 8.8     CBC:   Recent Labs   Lab 04/07/23  0917   WBC 13.00*   RBC 4.86   HGB 14.6   HCT 45.2      MCV 93   MCH 30.0   MCHC 32.3     Lipid Panel:   Recent Labs   Lab 04/06/23  0804   CHOL 130   LDLCALC 55.0*   HDL 51   TRIG 120     Coagulation:   Recent Labs   Lab 04/04/23  0449 04/06/23  0804   INR 1.1 1.1   APTT 24.4  --      Hgb A1C:   Recent Labs   Lab 04/04/23  0449   HGBA1C 7.2*     TSH:   Recent Labs   Lab 04/06/23  0804   TSH 3.775       Diagnostic Results     Brain Imaging     MRI brain 4/6  Diffusion restriction in the right subinsular cortex and in the right centrum semiovale, consistent with acute  infarctions.     String-like fashion involvement in the right centrum semiovale may represent watershed infarctions.  No evidence of hemorrhagic conversion.    CT head 4/3:   No acute intracranial abnormality detected at this time..  Mild chronic small vessel ischemic changes.  If persistent neurological symptoms, recommend MRI of the brain with diffusion-weighted sequencing    Vessel Imaging   CTA head and neck 4/7:  CTA head: Stable focal high-grade stenosis or short segment occlusion right proximal M2 MCA.     No evidence for significant proximal high-grade stenosis or occlusion.     CTA neck: Noncalcified plaquing of the carotid bifurcations and proximal ICAs with less than 50% proximal ICA stenosis by NASCET criteria.     CT head: Developing hypoattenuation within the right MCA territory compatible with evolving region of infarction seen on MRI.  No significant mass effect or hemorrhagic conversion     Cardiac Imaging   TTE 4/4:   The left ventricle is normal in size with mild concentric hypertrophy and normal systolic function.   The estimated ejection fraction is 70%.   Normal right ventricular size with normal right ventricular systolic function.   The estimated PA systolic pressure is 34 mmHg.   No intracardiac source of embolus noted on this exam. If high clinical suspicion, consider SAI +/- bubble study        Yassine Knutson MD  Comprehensive Stroke Center  Department of Vascular Neurology   [facility] ([facility])

## 2023-04-09 LAB — POCT GLUCOSE: 144 MG/DL (ref 70–110)

## 2023-04-09 PROCEDURE — 25000003 PHARM REV CODE 250: Performed by: STUDENT IN AN ORGANIZED HEALTH CARE EDUCATION/TRAINING PROGRAM

## 2023-04-09 PROCEDURE — 94664 DEMO&/EVAL PT USE INHALER: CPT | Mod: XB

## 2023-04-09 PROCEDURE — 27000221 HC OXYGEN, UP TO 24 HOURS

## 2023-04-09 PROCEDURE — 94761 N-INVAS EAR/PLS OXIMETRY MLT: CPT

## 2023-04-09 PROCEDURE — 94640 AIRWAY INHALATION TREATMENT: CPT

## 2023-04-09 PROCEDURE — 99233 SBSQ HOSP IP/OBS HIGH 50: CPT | Mod: ,,, | Performed by: STUDENT IN AN ORGANIZED HEALTH CARE EDUCATION/TRAINING PROGRAM

## 2023-04-09 PROCEDURE — 11000001 HC ACUTE MED/SURG PRIVATE ROOM

## 2023-04-09 PROCEDURE — G0378 HOSPITAL OBSERVATION PER HR: HCPCS

## 2023-04-09 PROCEDURE — 99900035 HC TECH TIME PER 15 MIN (STAT)

## 2023-04-09 PROCEDURE — 99233 PR SUBSEQUENT HOSPITAL CARE,LEVL III: ICD-10-PCS | Mod: ,,, | Performed by: STUDENT IN AN ORGANIZED HEALTH CARE EDUCATION/TRAINING PROGRAM

## 2023-04-09 PROCEDURE — 27000646 HC AEROBIKA DEVICE

## 2023-04-09 PROCEDURE — 25000003 PHARM REV CODE 250: Performed by: NURSE PRACTITIONER

## 2023-04-09 PROCEDURE — 25000242 PHARM REV CODE 250 ALT 637 W/ HCPCS: Performed by: NURSE PRACTITIONER

## 2023-04-09 PROCEDURE — 94640 AIRWAY INHALATION TREATMENT: CPT | Mod: XB

## 2023-04-09 RX ADMIN — IPRATROPIUM BROMIDE AND ALBUTEROL SULFATE 3 ML: 2.5; .5 SOLUTION RESPIRATORY (INHALATION) at 02:04

## 2023-04-09 RX ADMIN — Medication 6 MG: at 08:04

## 2023-04-09 RX ADMIN — METOPROLOL TARTRATE 50 MG: 50 TABLET, FILM COATED ORAL at 08:04

## 2023-04-09 RX ADMIN — MUPIROCIN: 20 OINTMENT TOPICAL at 08:04

## 2023-04-09 RX ADMIN — IPRATROPIUM BROMIDE AND ALBUTEROL SULFATE 3 ML: 2.5; .5 SOLUTION RESPIRATORY (INHALATION) at 12:04

## 2023-04-09 RX ADMIN — PANTOPRAZOLE SODIUM 40 MG: 40 TABLET, DELAYED RELEASE ORAL at 06:04

## 2023-04-09 RX ADMIN — ATORVASTATIN CALCIUM 80 MG: 40 TABLET, FILM COATED ORAL at 08:04

## 2023-04-09 RX ADMIN — GUAIFENESIN 600 MG: 600 TABLET, EXTENDED RELEASE ORAL at 08:04

## 2023-04-09 RX ADMIN — RIVAROXABAN 20 MG: 20 TABLET, FILM COATED ORAL at 06:04

## 2023-04-09 RX ADMIN — MUPIROCIN: 20 OINTMENT TOPICAL at 10:04

## 2023-04-09 RX ADMIN — IPRATROPIUM BROMIDE AND ALBUTEROL SULFATE 3 ML: 2.5; .5 SOLUTION RESPIRATORY (INHALATION) at 08:04

## 2023-04-09 NOTE — PROGRESS NOTES
[facility] - Neurosurgery ([facility])  Vascular Neurology  Comprehensive Stroke Center  Progress Note    Assessment/Plan:     * Stroke due to occlusion of right middle cerebral artery  74 y/o female with R MCA stroke that has extended and new infarcts Etiology cardioembolic    Antithrombotics: xarelto  Statin: Lipitor 40 mg daily  Therapy: PT/OT/ST/  Diagnostics: None  Risk factor modification: HTN, obesity, CAD, DM  VTE: Lovenox 40 mg sc daily, SCD's  SBP <180        Diabetes mellitus type 2 with neurological manifestations  Stroke risk factor  HgBA1C 7.2  SSI    COPD (chronic obstructive pulmonary disease)  On breathing TX Q6H  Mucinex Q12H  Acapella    Essential hypertension  Stroke risk factor  SBP <180         4/8: Patient went into Afib W RVR in the AM. Resolved after two doses of metoprolol 5 iv and now back on her home dose of oral 50 bid. No changes in exam during episode. Will transition from DAPT to eliquis once we speak to the patient's daughter   4/9: Patient's HR wnl since back on home dose metoprolol. Started on xarelto foe Afib, dc ASA and plavix. Waiting for PT/OT recs.       STROKE DOCUMENTATION   Acute Stroke Times   Last Known Normal Date: 04/05/23  Last Known Normal Time: 2100  Symptom Onset Date: 04/05/23  Symptom Onset Time: 0630  Stroke Team Called Date: 04/06/23  Stroke Team Called Time: 0739  Stroke Team Arrival Date: 04/06/23  Stroke Team Arrival Time: 0742  CT Interpretation Time: 0745  Thrombolytic Therapy Recommended: No  Thrombectomy Recommended: No    NIH Scale:     1a. Level of Consciousness: 0-->Alert, keenly responsive  1b. LOC Questions: 0-->Answers both questions correctly  1c. LOC Commands: 0-->Performs both tasks correctly  2. Best Gaze: 0-->Normal  3. Visual: 0-->No visual loss  4. Facial Palsy: 1-->Minor paralysis (flattened nasolabial fold, asymmetry on smiling)  5a. Motor Arm, Left: 1-->Drift, limb holds 90 (or 45) degrees, but drifts down before full 10 seconds, does not hit  bed or other support  5b. Motor Arm, Right: 0-->No drift, limb holds 90 (or 45) degrees for full 10 secs  6a. Motor Leg, Left: 0-->No drift, leg holds 30 degree position for full 5 secs  6b. Motor Leg, Right: 0-->No drift, leg holds 30 degree position for full 5 secs  7. Limb Ataxia: 0-->Absent  8. Sensory: 0-->Normal, no sensory loss  9. Best Language: 0-->No aphasia, normal  10. Dysarthria: 1-->Mild-to-moderate dysarthria, patient slurs at least some words and, at worst, can be understood with some difficulty  11. Extinction and Inattention (formerly Neglect): 0-->No abnormality  Total (NIH Stroke Scale): 3     Modified Allyson Score: 0  [place] Coma Scale:    ABCD2 Score:    [id number] Score:   HAS -BLED Score:   ICH Score:   Hunt & Stokes Classification:      Hemorrhagic change of an Ischemic Stroke: Does this patient have an ischemic stroke with hemorrhagic changes? No     Neurologic Chief Complaint: R MCA stroke     Subjective:     Interval History: Patient is seen for follow-up neurological assessment and treatment recommendations: Patient's HR wnl since back on home dose metoprolol. Started on xarelto foe Afib, dc ASA and plavix. Waiting for PT/OT recs.     HPI, Past Medical, Family, and Social History remains the same as documented in the initial encounter.     Review of Systems   Constitutional:  Negative for chills and fever.   HENT:  Negative for drooling and ear discharge.    Eyes:  Negative for pain and itching.   Respiratory:  Negative for cough and shortness of breath.    Cardiovascular:  Negative for chest pain and leg swelling.   Gastrointestinal:  Negative for abdominal distention and abdominal pain.   Endocrine: Negative for heat intolerance and polydipsia.   Genitourinary:  Negative for difficulty urinating and dyspareunia.   Musculoskeletal:  Positive for arthralgias and back pain.   Skin:  Negative for rash and wound.   Allergic/Immunologic: Negative for immunocompromised state.   Neurological:   Positive for facial asymmetry, speech difficulty and weakness.   Psychiatric/Behavioral:  Negative for agitation and confusion.    Scheduled Meds:   albuterol-ipratropium  3 mL Nebulization Q6H    atorvastatin  80 mg Oral Daily    guaiFENesin  600 mg Oral BID    melatonin  6 mg Oral Nightly    metoprolol tartrate  50 mg Oral BID    mupirocin   Nasal BID    pantoprazole  40 mg Oral Daily    rivaroxaban  20 mg Oral Daily with dinner    venlafaxine  75 mg Oral Daily     Continuous Infusions:  PRN Meds:acetaminophen, dextrose 10%, dextrose 10%, dextrose 10%, dextrose 10%, dextrose, dextrose, dextrose 50%, dextrose 50%, glucagon (human recombinant), glucose, glucose, insulin aspart U-100, labetalol, metoprolol, naloxone, polyethylene glycol    Objective:     Vital Signs (Most Recent):  Temp: 97.9 °F (36.6 °C) (04/09/23 0802)  Pulse: (!) 52 (04/09/23 1044)  Resp: 15 (04/09/23 0802)  BP: (!) 161/71 (04/09/23 0835)  SpO2: 98 % (04/09/23 0802)  BP Location: Right arm    Vital Signs Range (Last 24H):  Temp:  [97.8 °F (36.6 °C)-98.6 °F (37 °C)]   Pulse:  [52-73]   Resp:  [15-18]   BP: (126-161)/(64-73)   SpO2:  [95 %-99 %]   BP Location: Right arm    Physical Exam  Vitals and nursing note reviewed.   Constitutional:       General: She is not in acute distress.     Appearance: She is well-developed. She is obese. She is not diaphoretic.   HENT:      Head: Normocephalic and atraumatic.   Eyes:      General: No scleral icterus.     Pupils: Pupils are equal, round, and reactive to light.   Neck:      Thyroid: No thyromegaly.   Cardiovascular:      Rate and Rhythm: Normal rate and regular rhythm.      Heart sounds: No murmur heard.  Pulmonary:      Effort: Pulmonary effort is normal.      Breath sounds: Normal breath sounds. No stridor. No wheezing or rales.   Abdominal:      General: There is no distension.      Palpations: Abdomen is soft. There is no mass.      Tenderness: There is no abdominal tenderness. There is no  guarding.   Musculoskeletal:         General: No swelling or deformity. Normal range of motion.      Cervical back: Normal range of motion and neck supple.   Skin:     General: Skin is warm and dry.      Capillary Refill: Capillary refill takes less than 2 seconds.      Coloration: Skin is not jaundiced.      Findings: No bruising.       Neurological Exam:   LOC: alert  Attention Span: Good   Language: No aphasia  Articulation: Dysarthria  Orientation: Person, Place, Time   Visual Fields: Full  EOM (CN III, IV, VI): Full/intact  Pupils (CN II, III): PERRL  Facial Sensation (CN V): Normal  Facial Movement (CN VII): Lower facial weakness on the Left  Reflexes: 2+ throughout  Motor: Arm left  Paresis: 4/5  Leg left  Normal 5/5  Arm right  Normal 5/5  Leg right Normal 5/5  Cerebellum: No evidence of appendicular or axial ataxia  Sensation: Intact to light touch, temperature and vibration  Tone: Normal tone throughout    Laboratory:  BMP:   Recent Labs   Lab 04/06/23  0804      K 3.9   *   CO2 18*   BUN 20   CREATININE 0.8   CALCIUM 8.8     CBC:   Recent Labs   Lab 04/07/23  0917   WBC 13.00*   RBC 4.86   HGB 14.6   HCT 45.2      MCV 93   MCH 30.0   MCHC 32.3     Lipid Panel:   Recent Labs   Lab 04/06/23  0804   CHOL 130   LDLCALC 55.0*   HDL 51   TRIG 120     Coagulation:   Recent Labs   Lab 04/04/23  0449 04/06/23  0804   INR 1.1 1.1   APTT 24.4  --      Hgb A1C:   Recent Labs   Lab 04/04/23 0449   HGBA1C 7.2*     TSH:   Recent Labs   Lab 04/06/23  0804   TSH 3.775       Diagnostic Results     Brain Imaging     MRI brain 4/6  Diffusion restriction in the right subinsular cortex and in the right centrum semiovale, consistent with acute infarctions.     String-like fashion involvement in the right centrum semiovale may represent watershed infarctions.  No evidence of hemorrhagic conversion.    CT head 4/3:   No acute intracranial abnormality detected at this time..  Mild chronic small vessel ischemic  changes.  If persistent neurological symptoms, recommend MRI of the brain with diffusion-weighted sequencing    Vessel Imaging   CTA head and neck 4/7:  CTA head: Stable focal high-grade stenosis or short segment occlusion right proximal M2 MCA.     No evidence for significant proximal high-grade stenosis or occlusion.     CTA neck: Noncalcified plaquing of the carotid bifurcations and proximal ICAs with less than 50% proximal ICA stenosis by NASCET criteria.     CT head: Developing hypoattenuation within the right MCA territory compatible with evolving region of infarction seen on MRI.  No significant mass effect or hemorrhagic conversion     Cardiac Imaging   TTE 4/4:   The left ventricle is normal in size with mild concentric hypertrophy and normal systolic function.   The estimated ejection fraction is 70%.   Normal right ventricular size with normal right ventricular systolic function.   The estimated PA systolic pressure is 34 mmHg.   No intracardiac source of embolus noted on this exam. If high clinical suspicion, consider SAI +/- bubble study        Yassine Knutson MD  Comprehensive Stroke Center  Department of Vascular Neurology   [facility])

## 2023-04-09 NOTE — PLAN OF CARE
Problem: Adult Inpatient Plan of Care  Goal: Plan of Care Review  Outcome: Ongoing, Progressing  Goal: Patient-Specific Goal (Individualized)  Outcome: Ongoing, Progressing  Goal: Absence of Hospital-Acquired Illness or Injury  Outcome: Ongoing, Progressing  Goal: Optimal Comfort and Wellbeing  Outcome: Ongoing, Progressing     Problem: Adjustment to Illness (Stroke, Hemorrhagic)  Goal: Optimal Coping  Outcome: Ongoing, Progressing     Problem: Tissue Perfusion Altered  Goal: Improved Tissue Perfusion  Outcome: Ongoing, Progressing   Still have some weakness to her left side. Heart rate control 50's to 60's, sinus rula to sinus rhythm. A/O x4.

## 2023-04-09 NOTE — ASSESSMENT & PLAN NOTE
72 y/o female with R MCA stroke that has extended and new infarcts Etiology cardioembolic    Antithrombotics: xarelto  Statin: Lipitor 40 mg daily  Therapy: PT/OT/ST/  Diagnostics: None  Risk factor modification: HTN, obesity, CAD, DM  VTE: Lovenox 40 mg sc daily, SCD's  SBP <180

## 2023-04-09 NOTE — SUBJECTIVE & OBJECTIVE
Neurologic Chief Complaint: R MCA stroke     Subjective:     Interval History: Patient is seen for follow-up neurological assessment and treatment recommendations: Patient's HR wnl since back on home dose metoprolol. Started on xarelto foe Afib, dc ASA and plavix. Waiting for PT/OT recs.     HPI, Past Medical, Family, and Social History remains the same as documented in the initial encounter.     Review of Systems   Constitutional:  Negative for chills and fever.   HENT:  Negative for drooling and ear discharge.    Eyes:  Negative for pain and itching.   Respiratory:  Negative for cough and shortness of breath.    Cardiovascular:  Negative for chest pain and leg swelling.   Gastrointestinal:  Negative for abdominal distention and abdominal pain.   Endocrine: Negative for heat intolerance and polydipsia.   Genitourinary:  Negative for difficulty urinating and dyspareunia.   Musculoskeletal:  Positive for arthralgias and back pain.   Skin:  Negative for rash and wound.   Allergic/Immunologic: Negative for immunocompromised state.   Neurological:  Positive for facial asymmetry, speech difficulty and weakness.   Psychiatric/Behavioral:  Negative for agitation and confusion.    Scheduled Meds:   albuterol-ipratropium  3 mL Nebulization Q6H    atorvastatin  80 mg Oral Daily    guaiFENesin  600 mg Oral BID    melatonin  6 mg Oral Nightly    metoprolol tartrate  50 mg Oral BID    mupirocin   Nasal BID    pantoprazole  40 mg Oral Daily    rivaroxaban  20 mg Oral Daily with dinner    venlafaxine  75 mg Oral Daily     Continuous Infusions:  PRN Meds:acetaminophen, dextrose 10%, dextrose 10%, dextrose 10%, dextrose 10%, dextrose, dextrose, dextrose 50%, dextrose 50%, glucagon (human recombinant), glucose, glucose, insulin aspart U-100, labetalol, metoprolol, naloxone, polyethylene glycol    Objective:     Vital Signs (Most Recent):  Temp: 97.9 °F (36.6 °C) (04/09/23 0802)  Pulse: (!) 52 (04/09/23 1044)  Resp: 15 (04/09/23  0802)  BP: (!) 161/71 (04/09/23 0835)  SpO2: 98 % (04/09/23 0802)  BP Location: Right arm    Vital Signs Range (Last 24H):  Temp:  [97.8 °F (36.6 °C)-98.6 °F (37 °C)]   Pulse:  [52-73]   Resp:  [15-18]   BP: (126-161)/(64-73)   SpO2:  [95 %-99 %]   BP Location: Right arm    Physical Exam  Vitals and nursing note reviewed.   Constitutional:       General: She is not in acute distress.     Appearance: She is well-developed. She is obese. She is not diaphoretic.   HENT:      Head: Normocephalic and atraumatic.   Eyes:      General: No scleral icterus.     Pupils: Pupils are equal, round, and reactive to light.   Neck:      Thyroid: No thyromegaly.   Cardiovascular:      Rate and Rhythm: Normal rate and regular rhythm.      Heart sounds: No murmur heard.  Pulmonary:      Effort: Pulmonary effort is normal.      Breath sounds: Normal breath sounds. No stridor. No wheezing or rales.   Abdominal:      General: There is no distension.      Palpations: Abdomen is soft. There is no mass.      Tenderness: There is no abdominal tenderness. There is no guarding.   Musculoskeletal:         General: No swelling or deformity. Normal range of motion.      Cervical back: Normal range of motion and neck supple.   Skin:     General: Skin is warm and dry.      Capillary Refill: Capillary refill takes less than 2 seconds.      Coloration: Skin is not jaundiced.      Findings: No bruising.       Neurological Exam:   LOC: alert  Attention Span: Good   Language: No aphasia  Articulation: Dysarthria  Orientation: Person, Place, Time   Visual Fields: Full  EOM (CN III, IV, VI): Full/intact  Pupils (CN II, III): PERRL  Facial Sensation (CN V): Normal  Facial Movement (CN VII): Lower facial weakness on the Left  Reflexes: 2+ throughout  Motor: Arm left  Paresis: 4/5  Leg left  Normal 5/5  Arm right  Normal 5/5  Leg right Normal 5/5  Cerebellum: No evidence of appendicular or axial ataxia  Sensation: Intact to light touch, temperature and  vibration  Tone: Normal tone throughout    Laboratory:  BMP:   Recent Labs   Lab 04/06/23  0804      K 3.9   *   CO2 18*   BUN 20   CREATININE 0.8   CALCIUM 8.8     CBC:   Recent Labs   Lab 04/07/23  0917   WBC 13.00*   RBC 4.86   HGB 14.6   HCT 45.2      MCV 93   MCH 30.0   MCHC 32.3     Lipid Panel:   Recent Labs   Lab 04/06/23 0804   CHOL 130   LDLCALC 55.0*   HDL 51   TRIG 120     Coagulation:   Recent Labs   Lab 04/04/23 0449 04/06/23 0804   INR 1.1 1.1   APTT 24.4  --      Hgb A1C:   Recent Labs   Lab 04/04/23 0449   HGBA1C 7.2*     TSH:   Recent Labs   Lab 04/06/23 0804   TSH 3.775       Diagnostic Results     Brain Imaging     MRI brain 4/6  Diffusion restriction in the right subinsular cortex and in the right centrum semiovale, consistent with acute infarctions.     String-like fashion involvement in the right centrum semiovale may represent watershed infarctions.  No evidence of hemorrhagic conversion.    CT head 4/3:   No acute intracranial abnormality detected at this time..  Mild chronic small vessel ischemic changes.  If persistent neurological symptoms, recommend MRI of the brain with diffusion-weighted sequencing    Vessel Imaging   CTA head and neck 4/7:  CTA head: Stable focal high-grade stenosis or short segment occlusion right proximal M2 MCA.     No evidence for significant proximal high-grade stenosis or occlusion.     CTA neck: Noncalcified plaquing of the carotid bifurcations and proximal ICAs with less than 50% proximal ICA stenosis by NASCET criteria.     CT head: Developing hypoattenuation within the right MCA territory compatible with evolving region of infarction seen on MRI.  No significant mass effect or hemorrhagic conversion     Cardiac Imaging   TTE 4/4:  The left ventricle is normal in size with mild concentric hypertrophy and normal systolic function.  The estimated ejection fraction is 70%.  Normal right ventricular size with normal right ventricular  systolic function.  The estimated PA systolic pressure is 34 mmHg.  No intracardiac source of embolus noted on this exam. If high clinical suspicion, consider SAI +/- bubble study

## 2023-04-10 VITALS
SYSTOLIC BLOOD PRESSURE: 173 MMHG | WEIGHT: 224.88 LBS | HEIGHT: 66 IN | RESPIRATION RATE: 18 BRPM | HEART RATE: 56 BPM | DIASTOLIC BLOOD PRESSURE: 90 MMHG | BODY MASS INDEX: 36.14 KG/M2 | TEMPERATURE: 97 F | OXYGEN SATURATION: 96 %

## 2023-04-10 LAB
ALBUMIN SERPL BCP-MCNC: 3.2 G/DL (ref 3.5–5.2)
ALP SERPL-CCNC: 113 U/L (ref 55–135)
ALT SERPL W/O P-5'-P-CCNC: 38 U/L (ref 10–44)
ANION GAP SERPL CALC-SCNC: 10 MMOL/L (ref 8–16)
AST SERPL-CCNC: 23 U/L (ref 10–40)
BASOPHILS # BLD AUTO: 0.09 K/UL (ref 0–0.2)
BASOPHILS NFR BLD: 0.7 % (ref 0–1.9)
BILIRUB SERPL-MCNC: 0.9 MG/DL (ref 0.1–1)
BUN SERPL-MCNC: 21 MG/DL (ref 8–23)
CALCIUM SERPL-MCNC: 8.6 MG/DL (ref 8.7–10.5)
CHLORIDE SERPL-SCNC: 107 MMOL/L (ref 95–110)
CO2 SERPL-SCNC: 23 MMOL/L (ref 23–29)
CREAT SERPL-MCNC: 0.8 MG/DL (ref 0.5–1.4)
DIFFERENTIAL METHOD: ABNORMAL
EOSINOPHIL # BLD AUTO: 1 K/UL (ref 0–0.5)
EOSINOPHIL NFR BLD: 7.9 % (ref 0–8)
ERYTHROCYTE [DISTWIDTH] IN BLOOD BY AUTOMATED COUNT: 14 % (ref 11.5–14.5)
EST. GFR  (NO RACE VARIABLE): >60 ML/MIN/1.73 M^2
GLUCOSE SERPL-MCNC: 109 MG/DL (ref 70–110)
HCT VFR BLD AUTO: 45.5 % (ref 37–48.5)
HGB BLD-MCNC: 14.4 G/DL (ref 12–16)
IMM GRANULOCYTES # BLD AUTO: 0.06 K/UL (ref 0–0.04)
IMM GRANULOCYTES NFR BLD AUTO: 0.5 % (ref 0–0.5)
LYMPHOCYTES # BLD AUTO: 2.8 K/UL (ref 1–4.8)
LYMPHOCYTES NFR BLD: 22.4 % (ref 18–48)
MAGNESIUM SERPL-MCNC: 2.3 MG/DL (ref 1.6–2.6)
MCH RBC QN AUTO: 29.9 PG (ref 27–31)
MCHC RBC AUTO-ENTMCNC: 31.6 G/DL (ref 32–36)
MCV RBC AUTO: 95 FL (ref 82–98)
MONOCYTES # BLD AUTO: 1.2 K/UL (ref 0.3–1)
MONOCYTES NFR BLD: 9.7 % (ref 4–15)
NEUTROPHILS # BLD AUTO: 7.2 K/UL (ref 1.8–7.7)
NEUTROPHILS NFR BLD: 58.8 % (ref 38–73)
NRBC BLD-RTO: 0 /100 WBC
PHOSPHATE SERPL-MCNC: 2.9 MG/DL (ref 2.7–4.5)
PLATELET # BLD AUTO: 314 K/UL (ref 150–450)
PMV BLD AUTO: 10.8 FL (ref 9.2–12.9)
POTASSIUM SERPL-SCNC: 3.9 MMOL/L (ref 3.5–5.1)
PROT SERPL-MCNC: 6.4 G/DL (ref 6–8.4)
RBC # BLD AUTO: 4.81 M/UL (ref 4–5.4)
SODIUM SERPL-SCNC: 140 MMOL/L (ref 136–145)
WBC # BLD AUTO: 12.29 K/UL (ref 3.9–12.7)

## 2023-04-10 PROCEDURE — 80053 COMPREHEN METABOLIC PANEL: CPT | Performed by: PHYSICIAN ASSISTANT

## 2023-04-10 PROCEDURE — 83735 ASSAY OF MAGNESIUM: CPT | Performed by: PHYSICIAN ASSISTANT

## 2023-04-10 PROCEDURE — 25000003 PHARM REV CODE 250: Performed by: STUDENT IN AN ORGANIZED HEALTH CARE EDUCATION/TRAINING PROGRAM

## 2023-04-10 PROCEDURE — 25000003 PHARM REV CODE 250: Performed by: NURSE PRACTITIONER

## 2023-04-10 PROCEDURE — 85025 COMPLETE CBC W/AUTO DIFF WBC: CPT | Performed by: PHYSICIAN ASSISTANT

## 2023-04-10 PROCEDURE — 99900035 HC TECH TIME PER 15 MIN (STAT)

## 2023-04-10 PROCEDURE — 94640 AIRWAY INHALATION TREATMENT: CPT

## 2023-04-10 PROCEDURE — 25000242 PHARM REV CODE 250 ALT 637 W/ HCPCS: Performed by: NURSE PRACTITIONER

## 2023-04-10 PROCEDURE — 36415 COLL VENOUS BLD VENIPUNCTURE: CPT | Performed by: PHYSICIAN ASSISTANT

## 2023-04-10 PROCEDURE — 84100 ASSAY OF PHOSPHORUS: CPT | Performed by: PHYSICIAN ASSISTANT

## 2023-04-10 PROCEDURE — 97535 SELF CARE MNGMENT TRAINING: CPT

## 2023-04-10 PROCEDURE — 99233 SBSQ HOSP IP/OBS HIGH 50: CPT | Mod: ,,, | Performed by: STUDENT IN AN ORGANIZED HEALTH CARE EDUCATION/TRAINING PROGRAM

## 2023-04-10 PROCEDURE — 99233 PR SUBSEQUENT HOSPITAL CARE,LEVL III: ICD-10-PCS | Mod: ,,, | Performed by: STUDENT IN AN ORGANIZED HEALTH CARE EDUCATION/TRAINING PROGRAM

## 2023-04-10 PROCEDURE — 94761 N-INVAS EAR/PLS OXIMETRY MLT: CPT

## 2023-04-10 PROCEDURE — G0378 HOSPITAL OBSERVATION PER HR: HCPCS

## 2023-04-10 RX ADMIN — METOPROLOL TARTRATE 50 MG: 50 TABLET, FILM COATED ORAL at 08:04

## 2023-04-10 RX ADMIN — MUPIROCIN: 20 OINTMENT TOPICAL at 08:04

## 2023-04-10 RX ADMIN — IPRATROPIUM BROMIDE AND ALBUTEROL SULFATE 3 ML: 2.5; .5 SOLUTION RESPIRATORY (INHALATION) at 01:04

## 2023-04-10 RX ADMIN — ATORVASTATIN CALCIUM 80 MG: 40 TABLET, FILM COATED ORAL at 08:04

## 2023-04-10 RX ADMIN — GUAIFENESIN 600 MG: 600 TABLET, EXTENDED RELEASE ORAL at 08:04

## 2023-04-10 NOTE — DISCHARGE SUMMARY
Blair Thomas - Neurosurgery ([facility])  Vascular Neurology  Comprehensive Stroke Center  Discharge Summary     Summary:     Admit Date: 4/6/2023  7:25 AM    Discharge Date and Time:  04/10/2023 3:07 PM    Attending Physician: Maria Elena Mir MD     Discharge Provider: VERÓNICA Patel    History of Present Illness:   Ms. Luque is a 73-year-old female who was admitted to the hospital with slurred speech and left facial droop.  Initial CT unrevealing however MRI of the brain revealed an acute infarction in the area of right subinsular cortex and right centrum semiovale.  Also noted old lacunar infarcts in the left dennis.  Symptoms still present with increased left pronator drift.  Consult to neurology was placed.  CTA was ordered and noted evolution of stroke with no evidence of hemorrhage or shifting.  Telemetry reviewed throughout stay and noted to be in normal sinus rhythm without any evidence of atrial fibrillation or atrial flutter.  After reviewing imaging, neurology felt this was cardioembolic in nature and Cardiology was consulted.  Echo with bubble study did not reveal any evidence of shunt.  Recommendations including outpatient Holter monitor versus implanted loop recorder.  She remained on aspirin and Plavix and statin throughout the hospitalization.  On day of discharge 04/06, most of patient's symptoms had resolved and she was feeling well.  I had a long discussion with patient and her daughter about medications, no driving and referral placed a PMR.  We also discussed following up with her cardiologist for continued evaluation of possible arrhythmias.  They both expressed understanding and prescriptions were sent for patient.  Patient discharged home in stable condition.     All symptoms had abated by time of discharge, patient went home and ate a big dinner and felt well. Awoke the next morning around 1 a.m. with a really bad headache, went back to sleep awoke again at 6:00 a.m. with slurred speech and a facial  droop.  No confusion this time and steady gait, all limbs working appropriately.    She presented to the ED again at [facility] for dysarthria, facial droop and L UE weakness . Vascular neurology consulted in the ED.  Repeat MRI showed new strokes.MRI showed new signals in right frontal operculum compatible with acute to early subacute ischemia, these findings are in close proximity to previously identified additional evolving early subacute infarcts elsewhere in the frontal lobe, insula, and frontal parietal white matter. Additionally, there is a questionable new punctate area of restricted diffusion the right inferior jugular white matter at the level of the atrium of the lateral ventricle. And Questionable increased FLAIR signal within the M2 branches of the right MCA within the sylvian fissure. She did take her ASA, Plavix and Statin were taken this am.   Patient was admitted to ICU due to drowsiness due to benzo's given for imaging.     Patient was transferred to [facility] for further evaluation.   She is going to need a 30 day event monitor on discharge.   Upon examination patient has drift to LUE and facial droop with dysarthria has NIHSS 3        Hospital Course (synopsis of major diagnoses, care, treatment, and services provided during the course of the hospital stay):   Ms. Ivet Crabtree is a 73 y.o. female with PMH of HTN, HLD, CAD, recent R MCA stroke due to R M2 stenosis (4/3/23) admitted for R MCA stroke that has extended in addition to areas of new R MCA stroke. She presented to OSH with aphasia and LFD following discharge for recent R MCA stroke. No acute intervention with thrombolytics/EVT recommended. Repeat CTA 4/6 with stable high grade stenosis of R M2. Etiology at this time felt more likely FREDERICK/watershed based on pattern of infarct seen on MRI, of note patient did have episode of afib RVR however cardioembolic due to afib felt less likely as etiology at this time. During  admission, PT/OT/SLP services consulted for evaluation and recommend home with  PT/OT/SLP. Patient stable for discharge.    Please see below for all appropriate medication changes, imaging results, and necessary follow-up.      Goals of Care Treatment Preferences:  Code Status: Full Code      Stroke Etiology: Ischemic Large Artery Atherosclerosis/Atheroembolic Intracranial    STROKE DOCUMENTATION   Acute Stroke Times   Last Known Normal Date: 04/05/23  Last Known Normal Time: 2100  Symptom Onset Date: 04/05/23  Symptom Onset Time: 0630  Stroke Team Called Date: 04/06/23  Stroke Team Called Time: 0739  Stroke Team Arrival Date: 04/06/23  Stroke Team Arrival Time: 0742  CT Interpretation Time: 0745  Thrombolytic Therapy Recommended: No  Thrombectomy Recommended: No     NIH Scale:  1a. Level of Consciousness: 0-->Alert, keenly responsive  1b. LOC Questions: 0-->Answers both questions correctly  1c. LOC Commands: 0-->Performs both tasks correctly  2. Best Gaze: 0-->Normal  3. Visual: 0-->No visual loss  4. Facial Palsy: 1-->Minor paralysis (flattened nasolabial fold, asymmetry on smiling)  5a. Motor Arm, Left: 0-->No drift, limb holds 90 (or 45) degrees for full 10 secs  5b. Motor Arm, Right: 0-->No drift, limb holds 90 (or 45) degrees for full 10 secs  6a. Motor Leg, Left: 0-->No drift, leg holds 30 degree position for full 5 secs  6b. Motor Leg, Right: 0-->No drift, leg holds 30 degree position for full 5 secs  7. Limb Ataxia: 0-->Absent  8. Sensory: 0-->Normal, no sensory loss  9. Best Language: 0-->No aphasia, normal  10. Dysarthria: 1-->Mild-to-moderate dysarthria, patient slurs at least some words and, at worst, can be understood with some difficulty  11. Extinction and Inattention (formerly Neglect): 0-->No abnormality  Total (NIH Stroke Scale): 2        Modified [place] Score: 3  Kali Coma Scale:    ABCD2 Score:    [id number] Score:   HAS -BLED Score:   ICH Score:   Hunt & Stokes Classification:        Assessment/Plan:     Diagnostic Results:    Brain Imaging   MRI brain without contrast 4/6/23  -Diffusion restriction in the right subinsular cortex and in the right centrum semiovale, consistent with acute infarctions.  -String-like fashion involvement in the right centrum semiovale may represent watershed infarctions.  No evidence of hemorrhagic conversion.     CT head without contrast 4/3/23   -No acute intracranial abnormality detected at this time..  Mild chronic small vessel ischemic changes.  If persistent neurological symptoms, recommend MRI of the brain with diffusion-weighted sequencing     Vessel Imaging   CTA head and neck 4/7/23  -CTA head: Stable focal high-grade stenosis or short segment occlusion right proximal M2 MCA.  -No evidence for significant proximal high-grade stenosis or occlusion.  -CTA neck: Noncalcified plaquing of the carotid bifurcations and proximal ICAs with less than 50% proximal ICA stenosis by NASCET criteria.  -CT head: Developing hypoattenuation within the right MCA territory compatible with evolving region of infarction seen on MRI.  No significant mass effect or hemorrhagic conversion     Cardiac Imaging   TTE 4/4/23  The left ventricle is normal in size with mild concentric hypertrophy and normal systolic function.  The estimated ejection fraction is 70%.  Normal right ventricular size with normal right ventricular systolic function.  The estimated PA systolic pressure is 34 mmHg.  No intracardiac source of embolus noted on this exam. If high clinical suspicion, consider SAI +/- bubble study      Interventions: None    Complications: None    Disposition: Home-Health Care 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  Service: Vascular Neurology      Recommendations:     Post-discharge complication risks: Falls    Stroke Education given to: patient and family    Follow-up in Stroke Clinic in 4-6 weeks.     Discharge Plan:  Statin: Atorvastatin 40mg  Anticoagulant: Rivaroxaban  Aggresive risk factor modification:  Hypertension  High Cholesterol  Diet  Exercise      Follow Up:  PCP - as scheduled  Cardiology - as scheduled  Vascular Neurology - 4-6 weeks    Patient Instructions:      Ambulatory referral/consult to Vascular Neurology   Standing Status: Future   Referral Priority: Routine Referral Type: Consultation   Referral Reason: Specialty Services Required   Requested Specialty: Vascular Neurology   Number of Visits Requested: 1     Diet Cardiac   Order Comments: See Stroke Patient Education Guide Booklet for details.     Call 911 for any of the following:   Order Comments: Call 911  right away if any of the following warning signs come on suddenly, even if the symptoms only last for a few minutes. With stroke, timing is very important.   - Warning Signs of Stroke:  - Weakness: You may feel a sudden weakness, tingling or loss of feeling on one side of your face or body.  - Vision Problems: You may have sudden double vision or trouble seeing in one or both eyes.  - Speech Problems: You may have sudden trouble talking, slured speech, or problems understanding others.  - Headache: You may have sudden, severe headache.  - Movement Problems: You may  experience dizziness, a feeling of spinning, a loss of balance, a feeling of falling or blackouts.     Activity as tolerated       Medications:  Reconciled Home Medications:      Medication List        START taking these medications      rivaroxaban 20 mg Tab  Commonly known as: XARELTO  Take 1 tablet (20 mg total) by mouth daily with dinner or evening meal.            CONTINUE taking these medications      albuterol 90 mcg/actuation inhaler  Commonly known as: VENTOLIN HFA  Inhale 2 puffs into the lungs every 4 (four) hours as needed for Wheezing or Shortness of Breath. Rescue     atorvastatin 40 MG tablet  Commonly known as: LIPITOR  Take 2 tablets (80 mg total) by mouth once daily.     EPINEPHrine 0.15 mg/0.3 mL pen injection  Commonly known as: EPIPEN JR  Inject 0.15 mg into the muscle.     fluticasone propionate 50 mcg/actuation nasal spray  Commonly known as: FLONASE  1 spray by Each Nostril route once daily.     metoprolol tartrate 50 MG tablet  Commonly known as: LOPRESSOR  Take 1 tablet (50 mg total) by mouth 2 (two) times daily. Restart on 4/6/23     nitroGLYCERIN 0.4 MG SL tablet  Commonly known as: NITROSTAT  Place 0.4 mg under the tongue.     pantoprazole 40 MG tablet  Commonly known as: PROTONIX  Take 40 mg by mouth Daily.     potassium chloride 10 MEQ Tbsr  Commonly known as: KLOR-CON  Take 10 mEq by mouth Daily.     predniSONE 20 MG tablet  Commonly known as: DELTASONE  Take 2 tablets (40 mg total) by mouth once daily.     venlafaxine 75 MG 24 hr capsule  Commonly known as: EFFEXOR-XR  Take 75 mg by mouth Daily. Take the 75 mg tab po qd with the 150 mg tab po qd.  Total dose 225 mg po qd.            STOP taking these medications      aspirin 325 MG EC tablet  Commonly known as: ECOTRIN     clopidogreL 75 mg tablet  Commonly known as: PLAVIX     hydroCHLOROthiazide 25 MG tablet  Commonly known as: HYDRODIURIL              VERÓNICA Patel  [facility] Stroke [facility]  Department of Vascular Neurology    Blair Thomas - Neurosurgery ([facility])

## 2023-04-10 NOTE — ASSESSMENT & PLAN NOTE
Stroke risk factor  SBP <180  Home metoprolol resumed for episode of afib RVR  Continue to hold home HCTZ at this time until follow up with VN outpatient

## 2023-04-10 NOTE — PLAN OF CARE
Problem: Adult Inpatient Plan of Care  Goal: Plan of Care Review  Outcome: Ongoing, Progressing  Goal: Patient-Specific Goal (Individualized)  Outcome: Ongoing, Progressing  Goal: Absence of Hospital-Acquired Illness or Injury  Outcome: Ongoing, Progressing  Goal: Optimal Comfort and Wellbeing  Outcome: Ongoing, Progressing  Goal: Readiness for Transition of Care  Outcome: Ongoing, Progressing     Problem: Tissue Perfusion Altered  Goal: Improved Tissue Perfusion  Outcome: Ongoing, Progressing   HR SR 60's throughout shift tonight. Pt hoping to go home today. Uneventful shift.

## 2023-04-10 NOTE — PLAN OF CARE
04/10/23 1209   Final Note   Assessment Type Final Discharge Note   Anticipated Discharge Disposition Home-Health   Post-Acute Status   Post-Acute Authorization Home Health   Home Health Status Set-up Complete/Auth obtained     HH services set up with [facility].  They will start services tomorrow (4/11).  UVALDO met with patient and dtr Cindy and answered all questions.      Yolanda Rankin, CASTRO  [facility]  [phone]

## 2023-04-10 NOTE — ASSESSMENT & PLAN NOTE
Ivet Crabtree is a 73 y.o. female with PMH of HTN, HLD, CAD, recent R MCA stroke due to R M2 stenosis (4/3/23) admitted for R MCA stroke that has extended in addition to areas of new R MCA stroke. She presented to OSH with aphasia and LFD following discharge for recent R MCA stroke. No acute intervention with thrombolytics/EVT recommended. Repeat CTA 4/6 with stable high grade stenosis of R M2. Etiology at this time felt more likely FREDERICK/watershed based on pattern of infarct seen on MRI, of note patient did have episode of afib RVR however cardioembolic due to afib felt less likely as etiology at this time.    4/10: NAEO, continues to have mild dysarthria and LFD. PT/OT recs for home with HH therapies. Stable for discharge home with outpatient follow ups.      -Antithrombotics: Xarelto  -Statin: Lipitor 40 mg daily  -Therapy: Dispo recs for home with HH PT/OT/ST   -Risk factor modification: HTN, obesity, CAD, DM  -VTE: Lovenox 40 mg sc daily, SCD's  -SBP <180  -Follow ups: PCP as previously scheduled, Cardiology as previously scheduled, Vascular neurology within 4-6 weeks

## 2023-04-10 NOTE — PLAN OF CARE
Blair Thomas - Neurosurgery ([facility])      HOME HEALTH ORDERS  FACE TO FACE ENCOUNTER    Patient Name: Ivet Crabtree  YOB: 1950    PCP: Zoie Garcia MD   PCP Address: [address] [place] [address] [id number]  PCP Phone Number: [phone]  PCP Fax: [phone]    Encounter Date: 4/6/23    Admit to Home Health    Diagnoses:  Active Hospital Problems    Diagnosis  POA    *Stroke due to occlusion of right middle cerebral artery [I63.511]  Yes    Diabetes mellitus type 2 with neurological manifestations [E11.49]  Yes    Class 2 drug-induced obesity with body mass index (BMI) of 36.0 to 36.9 in adult [E66.1, Z68.36]  Not Applicable    Dysarthria [R47.1]  Yes    Hemiparesis, left [G81.94]  Yes    Elevated troponin, hx of CAD  [R77.8]  Yes    COPD (chronic obstructive pulmonary disease) [J44.9]  Yes    Essential hypertension [I10]  Yes      Resolved Hospital Problems   No resolved problems to display.       Follow Up Appointments:  No future appointments.    Allergies:  Review of patient's allergies indicates:   Allergen Reactions    Talwin [pentazocine lactate] Shortness Of Breath    Ace inhibitors Other (See Comments)     cough       Medications: Review discharge medications with patient and family and provide education.    Current Facility-Administered Medications   Medication Dose Route Frequency Provider Last Rate Last Admin    acetaminophen tablet 650 mg  650 mg Oral Q8H PRN Alfredo Blas MD        albuterol-ipratropium 2.5 mg-0.5 mg/3 mL nebulizer solution 3 mL  3 mL Nebulization Q6H Betzaida Garsia NP   3 mL at 04/09/23 2020    atorvastatin tablet 80 mg  80 mg Oral Daily Alfredo Blas MD   80 mg at 04/10/23 0839    dextrose 10% bolus 125 mL 125 mL  12.5 g Intravenous PRN Betzaida Garsia NP        dextrose 10% bolus 125 mL 125 mL  12.5 g Intravenous PRN Betzaida Garsia NP        dextrose 10% bolus 250 mL 250 mL  25 g Intravenous PRN Betzaida Garsia NP        dextrose 10% bolus 250 mL  250 mL  25 g Intravenous PRN Betzaida ANTOINEFariba Garsia, NP        dextrose 40 % gel 15,000 mg  15 g Oral PRN Betzaida ANTOINEFariba Garsia, NP        dextrose 40 % gel 30,000 mg  30 g Oral PRN Betzaida ANTOINEFariba Garsia, NP        dextrose 50% injection 12.5 g  12.5 g Intravenous PRN Alfredo Blas MD        dextrose 50% injection 25 g  25 g Intravenous PRN Alfredo Blas MD        glucagon (human recombinant) injection 1 mg  1 mg Intramuscular PRN Alfredo Blas MD        glucose chewable tablet 16 g  16 g Oral PRN Alfredo Blas MD        glucose chewable tablet 24 g  24 g Oral PRN Alfredo Blas MD        guaiFENesin 12 hr tablet 600 mg  600 mg Oral BID Betzaida Garsia, NP   600 mg at 04/10/23 0839    insulin aspart U-100 pen 0-5 Units  0-5 Units Subcutaneous QID (AC + HS) PRN Betzaida Garsia, RUDY        labetalol 20 mg/4 mL (5 mg/mL) IV syring  10 mg Intravenous Q6H PRN Yassine Knutson MD        melatonin tablet 6 mg  6 mg Oral Nightly Alfredo Blas MD   6 mg at 04/09/23 2036    metoprolol injection 5 mg  5 mg Intravenous Q5 Min PRN Yassine Knutson MD   5 mg at 04/08/23 0824    metoprolol tartrate (LOPRESSOR) tablet 50 mg  50 mg Oral BID Yassine Knutson MD   50 mg at 04/10/23 0839    mupirocin 2 % ointment   Nasal BID Alfredo Blas MD   Given at 04/10/23 0839    naloxone 0.4 mg/mL injection 0.02 mg  0.02 mg Intravenous PRN Alfredo Blas MD        pantoprazole EC tablet 40 mg  40 mg Oral Daily Alfredo Blas MD   40 mg at 04/09/23 1857    polyethylene glycol packet 17 g  17 g Oral BID PRN Alfredo Blas MD   17 g at 04/08/23 0757    rivaroxaban tablet 20 mg  20 mg Oral Daily with dinner Yassine Knutson MD   20 mg at 04/09/23 1857    venlafaxine 24 hr capsule 75 mg  75 mg Oral Daily Maria Elena Mir MD   75 mg at 04/08/23 1525     Current Discharge Medication List        START taking these medications    Details   rivaroxaban (XARELTO) 20 mg Tab Take 1 tablet (20 mg total) by mouth daily with dinner or evening meal.  Qty: 60 tablet, Refills: 0            CONTINUE these medications which have NOT CHANGED    Details   albuterol (VENTOLIN HFA) 90 mcg/actuation inhaler Inhale 2 puffs into the lungs every 4 (four) hours as needed for Wheezing or Shortness of Breath. Rescue  Qty: 1 Inhaler, Refills: 0      atorvastatin (LIPITOR) 40 MG tablet Take 2 tablets (80 mg total) by mouth once daily.      EPINEPHrine (EPIPEN JR) 0.15 mg/0.3 mL pen injection Inject 0.15 mg into the muscle.      fluticasone propionate (FLONASE) 50 mcg/actuation nasal spray 1 spray by Each Nostril route once daily.      metoprolol tartrate (LOPRESSOR) 50 MG tablet Take 1 tablet (50 mg total) by mouth 2 (two) times daily. Restart on 4/6/23    Comments: .      nitroGLYCERIN (NITROSTAT) 0.4 MG SL tablet Place 0.4 mg under the tongue.      pantoprazole (PROTONIX) 40 MG tablet Take 40 mg by mouth Daily.      potassium chloride (KLOR-CON) 10 MEQ TbSR Take 10 mEq by mouth Daily.      predniSONE (DELTASONE) 20 MG tablet Take 2 tablets (40 mg total) by mouth once daily.  Qty: 4 tablet, Refills: 0      venlafaxine (EFFEXOR-XR) 75 MG 24 hr capsule Take 75 mg by mouth Daily. Take the 75 mg tab po qd with the 150 mg tab po qd.  Total dose 225 mg po qd.           STOP taking these medications       aspirin (ECOTRIN) 325 MG EC tablet Comments:   Reason for Stopping:         clopidogreL (PLAVIX) 75 mg tablet Comments:   Reason for Stopping:         hydroCHLOROthiazide (HYDRODIURIL) 25 MG tablet Comments:   Reason for Stopping:                 I have seen and examined this patient within the last 30 days. My clinical findings that support the need for the home health skilled services and home bound status are the following:no   Weakness/numbness causing balance and gait disturbance due to Stroke making it taxing to leave home.     Diet:   regular diet      Referrals/ Consults  Physical Therapy to evaluate and treat. Evaluate for home safety and equipment needs; Establish/upgrade home exercise program. Perform /  instruct on therapeutic exercises, gait training, transfer training, and Range of Motion.  Occupational Therapy to evaluate and treat. Evaluate home environment for safety and equipment needs. Perform/Instruct on transfers, ADL training, ROM, and therapeutic exercises.  Speech Therapy  to evaluate and treat for  Language, Swallowing, and Cognition.    Activities:   activity as tolerated    Nursing:   Agency to admit patient within 24 hours of hospital discharge unless specified on physician order or at patient request    SN to complete comprehensive assessment including routine vital signs. Instruct on disease process and s/s of complications to report to MD. Review/verify medication list sent home with the patient at time of discharge  and instruct patient/caregiver as needed. Frequency may be adjusted depending on start of care date.     Skilled nurse to perform up to 3 visits PRN for symptoms related to diagnosis    Notify MD if SBP > 160 or < 90; DBP > 90 or < 50; HR > 120 or < 50; Temp > 101; O2 < 88%;    Ok to schedule additional visits based on staff availability and patient request on consecutive days within the home health episode.    When multiple disciplines ordered:    Start of Care occurs on Sunday - Wednesday schedule remaining discipline evaluations as ordered on separate consecutive days following the start of care.    Thursday SOC -schedule subsequent evaluations Friday and Monday the following week.     Friday - Saturday SOC - schedule subsequent discipline evaluations on consecutive days starting Monday of the following week.    For all post-discharge communication and subsequent orders please contact patient's primary care physician.       Home Health Aide:  Physical Therapy Three times weekly, Occupational Therapy Three times weekly, and Speech Language Pathology Three times weekly    Wound Care Orders  no    I certify that this patient is confined to her home and needs physical therapy, speech  therapy, and occupational therapy.

## 2023-04-10 NOTE — SUBJECTIVE & OBJECTIVE
Neurologic Chief Complaint: R MCA stroke     Subjective:     Interval History: NAEO, continues to have mild dysarthria and LFD. PT/OT recs for home with  therapies. Stable for discharge home with outpatient follow ups.    HPI, Past Medical, Family, and Social History remains the same as documented in the initial encounter.     Review of Systems   Constitutional:  Negative for chills and fever.   HENT:  Negative for drooling.    Eyes:  Negative for pain.   Respiratory:  Negative for cough.    Cardiovascular:  Negative for chest pain.   Gastrointestinal:  Negative for nausea and vomiting.   Skin:  Negative for rash and wound.   Neurological:  Positive for facial asymmetry, speech difficulty and weakness (improving).   Psychiatric/Behavioral:  Negative for agitation and confusion.    Scheduled Meds:   albuterol-ipratropium  3 mL Nebulization Q6H    atorvastatin  80 mg Oral Daily    guaiFENesin  600 mg Oral BID    melatonin  6 mg Oral Nightly    metoprolol tartrate  50 mg Oral BID    mupirocin   Nasal BID    pantoprazole  40 mg Oral Daily    rivaroxaban  20 mg Oral Daily with dinner    venlafaxine  75 mg Oral Daily     Continuous Infusions:  PRN Meds:acetaminophen, dextrose 10%, dextrose 10%, dextrose 10%, dextrose 10%, dextrose, dextrose, dextrose 50%, dextrose 50%, glucagon (human recombinant), glucose, glucose, insulin aspart U-100, labetalol, metoprolol, naloxone, polyethylene glycol    Objective:     Vital Signs (Most Recent):  Temp: 97.1 °F (36.2 °C) (04/10/23 0723)  Pulse: (!) 56 (04/10/23 0808)  Resp: 18 (04/10/23 0723)  BP: (!) 170/93 (04/10/23 0839)  SpO2: 98 % (04/10/23 0723)  BP Location: Right arm    Vital Signs Range (Last 24H):  Temp:  [97.1 °F (36.2 °C)-98.1 °F (36.7 °C)]   Pulse:  [52-83]   Resp:  [16-22]   BP: (143-170)/(68-95)   SpO2:  [95 %-100 %]   BP Location: Right arm    Physical Exam  Vitals and nursing note reviewed.   Constitutional:       General: She is not in acute distress.      Appearance: She is well-developed. She is obese. She is not diaphoretic.   HENT:      Head: Normocephalic and atraumatic.      Mouth/Throat:      Mouth: Mucous membranes are moist.   Eyes:      General: No scleral icterus.     Extraocular Movements: Extraocular movements intact.      Conjunctiva/sclera: Conjunctivae normal.   Neck:      Thyroid: No thyromegaly.   Cardiovascular:      Rate and Rhythm: Normal rate.      Heart sounds: No murmur heard.  Pulmonary:      Effort: Pulmonary effort is normal.   Abdominal:      General: There is no distension.   Musculoskeletal:         General: No deformity or signs of injury. Normal range of motion.      Cervical back: Normal range of motion and neck supple.   Skin:     General: Skin is warm and dry.   Neurological:      Mental Status: She is alert and oriented to person, place, and time.      Cranial Nerves: Dysarthria and facial asymmetry present.      Sensory: No sensory deficit.      Motor: Weakness (Mild L hemiparesis, improving, no drift on exam) present.   Psychiatric:         Attention and Perception: Attention normal.         Behavior: Behavior is cooperative.       Neurological Exam:   LOC: alert  Attention Span: Good   Language: No aphasia  Articulation: Dysarthria  Orientation: Person, Place, Time   Visual Fields: Full  EOM (CN III, IV, VI): Full/intact  Facial Sensation (CN V): Normal  Facial Movement (CN VII): Lower facial weakness on the Left  Motor: Arm left  Paresis: 4+/5  Leg left  Normal 5/5  Arm right  Normal 5/5  Leg right Normal 5/5  Sensation: Intact to light touch    Laboratory:  BMP:   Recent Labs   Lab 04/06/23  0804      K 3.9   *   CO2 18*   BUN 20   CREATININE 0.8   CALCIUM 8.8       CBC:   Recent Labs   Lab 04/07/23  0917   WBC 13.00*   RBC 4.86   HGB 14.6   HCT 45.2      MCV 93   MCH 30.0   MCHC 32.3       Lipid Panel:   Recent Labs   Lab 04/06/23  0804   CHOL 130   LDLCALC 55.0*   HDL 51   TRIG 120       Coagulation:    Recent Labs   Lab 04/04/23 0449 04/06/23  0804   INR 1.1 1.1   APTT 24.4  --        Hgb A1C:   Recent Labs   Lab 04/04/23 0449   HGBA1C 7.2*       TSH:   Recent Labs   Lab 04/06/23  0804   TSH 3.775         Diagnostic Results     Brain Imaging   MRI brain without contrast 4/6/23  -Diffusion restriction in the right subinsular cortex and in the right centrum semiovale, consistent with acute infarctions.  -String-like fashion involvement in the right centrum semiovale may represent watershed infarctions.  No evidence of hemorrhagic conversion.    CT head without contrast 4/3/23   -No acute intracranial abnormality detected at this time..  Mild chronic small vessel ischemic changes.  If persistent neurological symptoms, recommend MRI of the brain with diffusion-weighted sequencing    Vessel Imaging   CTA head and neck 4/7/23  -CTA head: Stable focal high-grade stenosis or short segment occlusion right proximal M2 MCA.  -No evidence for significant proximal high-grade stenosis or occlusion.  -CTA neck: Noncalcified plaquing of the carotid bifurcations and proximal ICAs with less than 50% proximal ICA stenosis by NASCET criteria.  -CT head: Developing hypoattenuation within the right MCA territory compatible with evolving region of infarction seen on MRI.  No significant mass effect or hemorrhagic conversion     Cardiac Imaging   TTE 4/4/23  The left ventricle is normal in size with mild concentric hypertrophy and normal systolic function.  The estimated ejection fraction is 70%.  Normal right ventricular size with normal right ventricular systolic function.  The estimated PA systolic pressure is 34 mmHg.  No intracardiac source of embolus noted on this exam. If high clinical suspicion, consider SAI +/- bubble study

## 2023-04-10 NOTE — PROGRESS NOTES
[facility] - Neurosurgery ([facility])  Vascular Neurology  Comprehensive Stroke Center  Progress Note    Assessment/Plan:     * Stroke due to occlusion of right middle cerebral artery    Ivet Crabtree is a 73 y.o. female with PMH of HTN, HLD, CAD, recent R MCA stroke due to R M2 stenosis (4/3/23) admitted for R MCA stroke that has extended in addition to areas of new R MCA stroke. She presented to OSH with aphasia and LFD following discharge for recent R MCA stroke. No acute intervention with thrombolytics/EVT recommended. Repeat CTA 4/6 with stable high grade stenosis of R M2. Etiology at this time felt more likely FREDERICK/watershed based on pattern of infarct seen on MRI, of note patient did have episode of afib RVR however cardioembolic due to afib felt less likely as etiology at this time.    4/10: NAEO, continues to have mild dysarthria and LFD. PT/OT recs for home with HH therapies. Stable for discharge home with outpatient follow ups.      -Antithrombotics: Xarelto  -Statin: Lipitor 40 mg daily  -Therapy: Dispo recs for home with HH PT/OT/ST   -Risk factor modification: HTN, obesity, CAD, DM  -VTE: Lovenox 40 mg sc daily, SCD's  -SBP <180  -Follow ups: PCP as previously scheduled, Cardiology as previously scheduled, Vascular neurology within 4-6 weeks    Diabetes mellitus type 2 with neurological manifestations  Stroke risk factor  A1c 7.2  SSI PRN    COPD (chronic obstructive pulmonary disease)  On breathing TX Q6H  Mucinex Q12H  Acapella    Essential hypertension  Stroke risk factor  SBP <180  Home metoprolol resumed for episode of afib RVR  Continue to hold home HCTZ at this time until follow up with VN outpatient         4/8: Patient went into Afib W RVR in the AM. Resolved after two doses of metoprolol 5 iv and now back on her home dose of oral 50 bid. No changes in exam during episode. Will transition from DAPT to eliquis once we speak to the patient's daughter   4/9: Patient's HR wnl since back on home  dose metoprolol. Started on xarelto for Afib, dc ASA and plavix. Waiting for PT/OT recs.   04/10/2023 MICHAEL, continues to have mild dysarthria and LFD. PT/OT recs for home with  therapies. Stable for discharge home with outpatient follow ups.        STROKE DOCUMENTATION   Acute Stroke Times   Last Known Normal Date: 04/05/23  Last Known Normal Time: 2100  Symptom Onset Date: 04/05/23  Symptom Onset Time: 0630  Stroke Team Called Date: 04/06/23  Stroke Team Called Time: 0739  Stroke Team Arrival Date: 04/06/23  Stroke Team Arrival Time: 0742  CT Interpretation Time: 0745  Thrombolytic Therapy Recommended: No  Thrombectomy Recommended: No    NIH Scale:  1a. Level of Consciousness: 0-->Alert, keenly responsive  1b. LOC Questions: 0-->Answers both questions correctly  1c. LOC Commands: 0-->Performs both tasks correctly  2. Best Gaze: 0-->Normal  3. Visual: 0-->No visual loss  4. Facial Palsy: 1-->Minor paralysis (flattened nasolabial fold, asymmetry on smiling)  5a. Motor Arm, Left: 0-->No drift, limb holds 90 (or 45) degrees for full 10 secs  5b. Motor Arm, Right: 0-->No drift, limb holds 90 (or 45) degrees for full 10 secs  6a. Motor Leg, Left: 0-->No drift, leg holds 30 degree position for full 5 secs  6b. Motor Leg, Right: 0-->No drift, leg holds 30 degree position for full 5 secs  7. Limb Ataxia: 0-->Absent  8. Sensory: 0-->Normal, no sensory loss  9. Best Language: 0-->No aphasia, normal  10. Dysarthria: 1-->Mild-to-moderate dysarthria, patient slurs at least some words and, at worst, can be understood with some difficulty  11. Extinction and Inattention (formerly Neglect): 0-->No abnormality  Total (NIH Stroke Scale): 2       Modified [place] Score: 3  [place] Coma Scale:    ABCD2 Score:    [id number] Score:   HAS -BLED Score:   ICH Score:   Hunt & Stokes Classification:      Hemorrhagic change of an Ischemic Stroke: Does this patient have an ischemic stroke with hemorrhagic changes? No     Neurologic Chief  Complaint: R MCA stroke     Subjective:     Interval History: ANTHONYO, continues to have mild dysarthria and LFD. PT/OT recs for home with  therapies. Stable for discharge home with outpatient follow ups.    HPI, Past Medical, Family, and Social History remains the same as documented in the initial encounter.     Review of Systems   Constitutional:  Negative for chills and fever.   HENT:  Negative for drooling.    Eyes:  Negative for pain.   Respiratory:  Negative for cough.    Cardiovascular:  Negative for chest pain.   Gastrointestinal:  Negative for nausea and vomiting.   Skin:  Negative for rash and wound.   Neurological:  Positive for facial asymmetry, speech difficulty and weakness (improving).   Psychiatric/Behavioral:  Negative for agitation and confusion.    Scheduled Meds:   albuterol-ipratropium  3 mL Nebulization Q6H    atorvastatin  80 mg Oral Daily    guaiFENesin  600 mg Oral BID    melatonin  6 mg Oral Nightly    metoprolol tartrate  50 mg Oral BID    mupirocin   Nasal BID    pantoprazole  40 mg Oral Daily    rivaroxaban  20 mg Oral Daily with dinner    venlafaxine  75 mg Oral Daily     Continuous Infusions:  PRN Meds:acetaminophen, dextrose 10%, dextrose 10%, dextrose 10%, dextrose 10%, dextrose, dextrose, dextrose 50%, dextrose 50%, glucagon (human recombinant), glucose, glucose, insulin aspart U-100, labetalol, metoprolol, naloxone, polyethylene glycol    Objective:     Vital Signs (Most Recent):  Temp: 97.1 °F (36.2 °C) (04/10/23 0723)  Pulse: (!) 56 (04/10/23 0808)  Resp: 18 (04/10/23 0723)  BP: (!) 170/93 (04/10/23 0839)  SpO2: 98 % (04/10/23 0723)  BP Location: Right arm    Vital Signs Range (Last 24H):  Temp:  [97.1 °F (36.2 °C)-98.1 °F (36.7 °C)]   Pulse:  [52-83]   Resp:  [16-22]   BP: (143-170)/(68-95)   SpO2:  [95 %-100 %]   BP Location: Right arm    Physical Exam  Vitals and nursing note reviewed.   Constitutional:       General: She is not in acute distress.     Appearance: She  is well-developed. She is obese. She is not diaphoretic.   HENT:      Head: Normocephalic and atraumatic.      Mouth/Throat:      Mouth: Mucous membranes are moist.   Eyes:      General: No scleral icterus.     Extraocular Movements: Extraocular movements intact.      Conjunctiva/sclera: Conjunctivae normal.   Neck:      Thyroid: No thyromegaly.   Cardiovascular:      Rate and Rhythm: Normal rate.      Heart sounds: No murmur heard.  Pulmonary:      Effort: Pulmonary effort is normal.   Abdominal:      General: There is no distension.   Musculoskeletal:         General: No deformity or signs of injury. Normal range of motion.      Cervical back: Normal range of motion and neck supple.   Skin:     General: Skin is warm and dry.   Neurological:      Mental Status: She is alert and oriented to person, place, and time.      Cranial Nerves: Dysarthria and facial asymmetry present.      Sensory: No sensory deficit.      Motor: Weakness (Mild L hemiparesis, improving, no drift on exam) present.   Psychiatric:         Attention and Perception: Attention normal.         Behavior: Behavior is cooperative.       Neurological Exam:   LOC: alert  Attention Span: Good   Language: No aphasia  Articulation: Dysarthria  Orientation: Person, Place, Time   Visual Fields: Full  EOM (CN III, IV, VI): Full/intact  Facial Sensation (CN V): Normal  Facial Movement (CN VII): Lower facial weakness on the Left  Motor: Arm left  Paresis: 4+/5  Leg left  Normal 5/5  Arm right  Normal 5/5  Leg right Normal 5/5  Sensation: Intact to light touch    Laboratory:  BMP:   Recent Labs   Lab 04/06/23  0804      K 3.9   *   CO2 18*   BUN 20   CREATININE 0.8   CALCIUM 8.8       CBC:   Recent Labs   Lab 04/07/23  0917   WBC 13.00*   RBC 4.86   HGB 14.6   HCT 45.2      MCV 93   MCH 30.0   MCHC 32.3       Lipid Panel:   Recent Labs   Lab 04/06/23  0804   CHOL 130   LDLCALC 55.0*   HDL 51   TRIG 120       Coagulation:   Recent Labs   Lab  04/04/23  0449 04/06/23  0804   INR 1.1 1.1   APTT 24.4  --        Hgb A1C:   Recent Labs   Lab 04/04/23 0449   HGBA1C 7.2*       TSH:   Recent Labs   Lab 04/06/23  0804   TSH 3.775         Diagnostic Results     Brain Imaging   MRI brain without contrast 4/6/23  -Diffusion restriction in the right subinsular cortex and in the right centrum semiovale, consistent with acute infarctions.  -String-like fashion involvement in the right centrum semiovale may represent watershed infarctions.  No evidence of hemorrhagic conversion.    CT head without contrast 4/3/23   -No acute intracranial abnormality detected at this time..  Mild chronic small vessel ischemic changes.  If persistent neurological symptoms, recommend MRI of the brain with diffusion-weighted sequencing    Vessel Imaging   CTA head and neck 4/7/23  -CTA head: Stable focal high-grade stenosis or short segment occlusion right proximal M2 MCA.  -No evidence for significant proximal high-grade stenosis or occlusion.  -CTA neck: Noncalcified plaquing of the carotid bifurcations and proximal ICAs with less than 50% proximal ICA stenosis by NASCET criteria.  -CT head: Developing hypoattenuation within the right MCA territory compatible with evolving region of infarction seen on MRI.  No significant mass effect or hemorrhagic conversion     Cardiac Imaging   TTE 4/4/23   The left ventricle is normal in size with mild concentric hypertrophy and normal systolic function.   The estimated ejection fraction is 70%.   Normal right ventricular size with normal right ventricular systolic function.   The estimated PA systolic pressure is 34 mmHg.   No intracardiac source of embolus noted on this exam. If high clinical suspicion, consider SAI +/- bubble study        VERÓNICA Patel  Comprehensive Stroke Center  Department of Vascular Neurology   [facility] - Neurosurgery ([facility])

## 2023-04-10 NOTE — PLAN OF CARE
Goals remain appropriate.  Problem: Occupational Therapy  Goal: Occupational Therapy Goal  Description: Goals to be met by: 4/21/23     Patient will increase functional independence with ADLs by performing:    UE Dressing with Modified [place].  LE Dressing with Modified [place].  Grooming while standing with Modified [place].  Toileting from toilet with Supervision for hygiene and clothing management.   Bathing from  shower chair/bench with Supervision.  Toilet transfer to toilet with Modified [place].    Outcome: Ongoing, Progressing

## 2023-04-10 NOTE — PLAN OF CARE
Problem: Adult Inpatient Plan of Care  Goal: Plan of Care Review  Outcome: Met  Goal: Patient-Specific Goal (Individualized)  Outcome: Met  Goal: Absence of Hospital-Acquired Illness or Injury  Outcome: Met  Goal: Optimal Comfort and Wellbeing  Outcome: Met  Goal: Readiness for Transition of Care  Outcome: Met     Problem: Skin Injury Risk Increased  Goal: Skin Health and Integrity  Outcome: Met     Problem: Impaired Wound Healing  Goal: Optimal Wound Healing  Outcome: Met     Problem: Diabetes Comorbidity  Goal: Blood Glucose Level Within Targeted Range  Outcome: Met     Problem: Adjustment to Illness (Stroke, Hemorrhagic)  Goal: Optimal Coping  Outcome: Met     Problem: Tissue Perfusion Altered  Goal: Improved Tissue Perfusion  Outcome: Met

## 2023-04-10 NOTE — PT/OT/SLP PROGRESS
"Occupational Therapy   Treatment    Name: Ivet Crabtree  MRN: [id number]  Admitting Diagnosis:  Stroke due to occlusion of right middle cerebral artery       Recommendations:     Discharge Recommendations: other (see comments)  Discharge Equipment Recommendations:  none  Barriers to discharge:  None    Assessment:     Ivet Crabtree is a 73 y.o. female with a medical diagnosis of Stroke due to occlusion of right middle cerebral artery.  She presents with performance deficits affecting function are weakness, impaired functional mobility, impaired self care skills, decreased coordination.     Rehab Prognosis:  Good; patient would benefit from acute skilled OT services to address these deficits and reach maximum level of function.       Plan:     Patient to be seen 3 x/week to address the above listed problems via sensory integration, cognitive retraining, neuromuscular re-education, therapeutic exercises, therapeutic activities, self-care/home management  Plan of Care Expires: 04/18/23  Plan of Care Reviewed with: patient    Subjective   Patient:  "I don't have any pain."  Pain/Comfort:  Pain Rating 1: 0/10  Pain Rating Post-Intervention 1: 0/10    Objective:     Communicated with: Nurse prior to session.  Patient found supine with telemetry upon OT entry to room.    General Precautions: Standard, aspiration, fall    Orthopedic Precautions:N/A  Braces: N/A  Respiratory Status: Room air     Occupational Performance:     Bed Mobility:    Patient completed Rolling/Turning to Left with  modified independence  Patient completed Rolling/Turning to Right with modified independence  Patient completed Supine to Sit with modified independence  Patient completed Sit to Supine with modified independence     Functional Mobility/Transfers:  Patient completed Sit <> Stand Transfer with supervision  with  no assistive device   Patient completed Bed <> Chair Transfer using Stand Pivot technique with supervision with no " assistive device    Activities of Daily Living:  Grooming: supervision while standing  Upper Body Dressing: supervision    Lower Body Dressing: supervision      AMPAC 6 Click ADL: 18    Treatment & Education:  Patient alert and oriented x 3; able to follow 4/4 one step commands.  Patient attentive and interactive throughout the session.       Patient left supine with all lines intact, call button in reach, and bed alarm on    GOALS:   Multidisciplinary Problems       Occupational Therapy Goals          Problem: Occupational Therapy    Goal Priority Disciplines Outcome Interventions   Occupational Therapy Goal     OT, PT/OT Ongoing, Progressing    Description: Goals to be met by: 4/21/23     Patient will increase functional independence with ADLs by performing:    UE Dressing with Modified [place].  LE Dressing with Modified [place].  Grooming while standing with Modified [place].  Toileting from toilet with Supervision for hygiene and clothing management.   Bathing from  shower chair/bench with Supervision.  Toilet transfer to toilet with Modified [place].                         Time Tracking:     OT Date of Treatment: 04/10/23  OT Start Time: 0535  OT Stop Time: 0558  OT Total Time (min): 23 min    Billable Minutes:Self Care/Home Management 23    OT/MARCIA: OT     Number of MARCIA visits since last OT visit: 0    4/10/2023

## 2023-04-11 ENCOUNTER — PATIENT OUTREACH (OUTPATIENT)
Dept: ADMINISTRATIVE | Facility: CLINIC | Age: 73
End: 2023-04-11
Payer: COMMERCIAL

## 2023-04-11 ENCOUNTER — NURSE TRIAGE (OUTPATIENT)
Dept: ADMINISTRATIVE | Facility: CLINIC | Age: 73
End: 2023-04-11
Payer: COMMERCIAL

## 2023-04-11 ENCOUNTER — HOSPITAL ENCOUNTER (EMERGENCY)
Facility: HOSPITAL | Age: 73
Discharge: HOME | End: 2023-04-11
Attending: STUDENT IN AN ORGANIZED HEALTH CARE EDUCATION/TRAINING PROGRAM
Payer: COMMERCIAL

## 2023-04-11 VITALS
DIASTOLIC BLOOD PRESSURE: 88 MMHG | HEIGHT: 66 IN | WEIGHT: 224 LBS | OXYGEN SATURATION: 97 % | TEMPERATURE: 98 F | RESPIRATION RATE: 16 BRPM | SYSTOLIC BLOOD PRESSURE: 137 MMHG | BODY MASS INDEX: 36 KG/M2 | HEART RATE: 77 BPM

## 2023-04-11 DIAGNOSIS — N39.0 URINARY TRACT INFECTION WITHOUT HEMATURIA, SITE UNSPECIFIED: Primary | ICD-10-CM

## 2023-04-11 LAB
ALBUMIN SERPL BCP-MCNC: 3.7 G/DL (ref 3.5–5.2)
ALP SERPL-CCNC: 114 U/L (ref 55–135)
ALT SERPL W/O P-5'-P-CCNC: 40 U/L (ref 10–44)
ANION GAP SERPL CALC-SCNC: 10 MMOL/L (ref 8–16)
AST SERPL-CCNC: 23 U/L (ref 10–40)
BACTERIA #/AREA URNS HPF: ABNORMAL /HPF
BASOPHILS # BLD AUTO: 0.07 K/UL (ref 0–0.2)
BASOPHILS NFR BLD: 0.4 % (ref 0–1.9)
BILIRUB SERPL-MCNC: 1.4 MG/DL (ref 0.1–1)
BILIRUB UR QL STRIP: NEGATIVE
BUN SERPL-MCNC: 19 MG/DL (ref 8–23)
CALCIUM SERPL-MCNC: 9.1 MG/DL (ref 8.7–10.5)
CHLORIDE SERPL-SCNC: 105 MMOL/L (ref 95–110)
CLARITY UR: ABNORMAL
CO2 SERPL-SCNC: 25 MMOL/L (ref 23–29)
COLOR UR: YELLOW
CREAT SERPL-MCNC: 1 MG/DL (ref 0.5–1.4)
DIFFERENTIAL METHOD: ABNORMAL
EOSINOPHIL # BLD AUTO: 0.8 K/UL (ref 0–0.5)
EOSINOPHIL NFR BLD: 4.3 % (ref 0–8)
ERYTHROCYTE [DISTWIDTH] IN BLOOD BY AUTOMATED COUNT: 15.3 % (ref 11.5–14.5)
EST. GFR  (NO RACE VARIABLE): 59 ML/MIN/1.73 M^2
GLUCOSE SERPL-MCNC: 179 MG/DL (ref 70–110)
GLUCOSE UR QL STRIP: NEGATIVE
HCT VFR BLD AUTO: 65.8 % (ref 37–48.5)
HGB BLD-MCNC: 16.9 G/DL (ref 12–16)
HGB UR QL STRIP: ABNORMAL
HYALINE CASTS #/AREA URNS LPF: 0 /LPF
IMM GRANULOCYTES # BLD AUTO: 0.11 K/UL (ref 0–0.04)
IMM GRANULOCYTES NFR BLD AUTO: 0.6 % (ref 0–0.5)
KETONES UR QL STRIP: ABNORMAL
LEUKOCYTE ESTERASE UR QL STRIP: ABNORMAL
LIPASE SERPL-CCNC: 25 U/L (ref 4–60)
LYMPHOCYTES # BLD AUTO: 1.6 K/UL (ref 1–4.8)
LYMPHOCYTES NFR BLD: 8.5 % (ref 18–48)
MAGNESIUM SERPL-MCNC: 2.4 MG/DL (ref 1.6–2.6)
MCH RBC QN AUTO: 30.7 PG (ref 27–31)
MCHC RBC AUTO-ENTMCNC: 25.7 G/DL (ref 32–36)
MCV RBC AUTO: 119 FL (ref 82–98)
MICROSCOPIC COMMENT: ABNORMAL
MONOCYTES # BLD AUTO: 1.2 K/UL (ref 0.3–1)
MONOCYTES NFR BLD: 6.2 % (ref 4–15)
NEUTROPHILS # BLD AUTO: 14.8 K/UL (ref 1.8–7.7)
NEUTROPHILS NFR BLD: 80 % (ref 38–73)
NITRITE UR QL STRIP: NEGATIVE
NRBC BLD-RTO: 0 /100 WBC
PH UR STRIP: 7 [PH] (ref 5–8)
PLATELET # BLD AUTO: 295 K/UL (ref 150–450)
PMV BLD AUTO: 12.3 FL (ref 9.2–12.9)
POCT GLUCOSE: 178 MG/DL (ref 70–110)
POTASSIUM SERPL-SCNC: 4.1 MMOL/L (ref 3.5–5.1)
PROT SERPL-MCNC: 7.6 G/DL (ref 6–8.4)
PROT UR QL STRIP: ABNORMAL
RBC # BLD AUTO: 5.51 M/UL (ref 4–5.4)
RBC #/AREA URNS HPF: 10 /HPF (ref 0–4)
SODIUM SERPL-SCNC: 140 MMOL/L (ref 136–145)
SP GR UR STRIP: 1.02 (ref 1–1.03)
URN SPEC COLLECT METH UR: ABNORMAL
UROBILINOGEN UR STRIP-ACNC: ABNORMAL EU/DL
WBC # BLD AUTO: 18.5 K/UL (ref 3.9–12.7)
WBC #/AREA URNS HPF: >100 /HPF (ref 0–5)
WBC CLUMPS URNS QL MICRO: ABNORMAL

## 2023-04-11 PROCEDURE — 96375 TX/PRO/DX INJ NEW DRUG ADDON: CPT

## 2023-04-11 PROCEDURE — 63600175 PHARM REV CODE 636 W HCPCS: Performed by: EMERGENCY MEDICINE

## 2023-04-11 PROCEDURE — 83735 ASSAY OF MAGNESIUM: CPT | Performed by: STUDENT IN AN ORGANIZED HEALTH CARE EDUCATION/TRAINING PROGRAM

## 2023-04-11 PROCEDURE — 87186 SC STD MICRODIL/AGAR DIL: CPT | Performed by: STUDENT IN AN ORGANIZED HEALTH CARE EDUCATION/TRAINING PROGRAM

## 2023-04-11 PROCEDURE — 96361 HYDRATE IV INFUSION ADD-ON: CPT

## 2023-04-11 PROCEDURE — 63600175 PHARM REV CODE 636 W HCPCS: Performed by: STUDENT IN AN ORGANIZED HEALTH CARE EDUCATION/TRAINING PROGRAM

## 2023-04-11 PROCEDURE — 87088 URINE BACTERIA CULTURE: CPT | Performed by: STUDENT IN AN ORGANIZED HEALTH CARE EDUCATION/TRAINING PROGRAM

## 2023-04-11 PROCEDURE — 99285 EMERGENCY DEPT VISIT HI MDM: CPT | Mod: 25

## 2023-04-11 PROCEDURE — 25000003 PHARM REV CODE 250: Performed by: STUDENT IN AN ORGANIZED HEALTH CARE EDUCATION/TRAINING PROGRAM

## 2023-04-11 PROCEDURE — 87077 CULTURE AEROBIC IDENTIFY: CPT | Performed by: STUDENT IN AN ORGANIZED HEALTH CARE EDUCATION/TRAINING PROGRAM

## 2023-04-11 PROCEDURE — 80053 COMPREHEN METABOLIC PANEL: CPT | Performed by: STUDENT IN AN ORGANIZED HEALTH CARE EDUCATION/TRAINING PROGRAM

## 2023-04-11 PROCEDURE — 87086 URINE CULTURE/COLONY COUNT: CPT | Performed by: STUDENT IN AN ORGANIZED HEALTH CARE EDUCATION/TRAINING PROGRAM

## 2023-04-11 PROCEDURE — 82962 GLUCOSE BLOOD TEST: CPT

## 2023-04-11 PROCEDURE — 96365 THER/PROPH/DIAG IV INF INIT: CPT

## 2023-04-11 PROCEDURE — 83690 ASSAY OF LIPASE: CPT | Performed by: STUDENT IN AN ORGANIZED HEALTH CARE EDUCATION/TRAINING PROGRAM

## 2023-04-11 PROCEDURE — 85025 COMPLETE CBC W/AUTO DIFF WBC: CPT | Performed by: STUDENT IN AN ORGANIZED HEALTH CARE EDUCATION/TRAINING PROGRAM

## 2023-04-11 PROCEDURE — 81000 URINALYSIS NONAUTO W/SCOPE: CPT | Performed by: STUDENT IN AN ORGANIZED HEALTH CARE EDUCATION/TRAINING PROGRAM

## 2023-04-11 RX ORDER — ONDANSETRON 4 MG/1
4 TABLET, ORALLY DISINTEGRATING ORAL EVERY 6 HOURS PRN
Qty: 20 TABLET | Refills: 0 | Status: SHIPPED | OUTPATIENT
Start: 2023-04-11

## 2023-04-11 RX ORDER — CEPHALEXIN 500 MG/1
500 CAPSULE ORAL EVERY 12 HOURS
Qty: 14 CAPSULE | Refills: 0 | Status: SHIPPED | OUTPATIENT
Start: 2023-04-11 | End: 2023-04-18

## 2023-04-11 RX ORDER — ONDANSETRON 2 MG/ML
4 INJECTION INTRAMUSCULAR; INTRAVENOUS
Status: COMPLETED | OUTPATIENT
Start: 2023-04-11 | End: 2023-04-11

## 2023-04-11 RX ORDER — PANTOPRAZOLE SODIUM 40 MG/1
40 TABLET, DELAYED RELEASE ORAL DAILY
Qty: 30 TABLET | Refills: 0 | Status: SHIPPED | OUTPATIENT
Start: 2023-04-11 | End: 2024-04-10

## 2023-04-11 RX ADMIN — SODIUM CHLORIDE 1000 ML: 9 INJECTION, SOLUTION INTRAVENOUS at 05:04

## 2023-04-11 RX ADMIN — ONDANSETRON 4 MG: 2 INJECTION INTRAMUSCULAR; INTRAVENOUS at 05:04

## 2023-04-11 RX ADMIN — CEFTRIAXONE 1 G: 1 INJECTION, SOLUTION INTRAVENOUS at 09:04

## 2023-04-11 NOTE — ED PROVIDER NOTES
Encounter Date: 2023    SCRIBE #1 NOTE: I, Mariano Ramires, am scribing for, and in the presence of,  Jesus Sexton MD. I have scribed the following portions of the note - Other sections scribed: HPI, ROS, PE.     History     Chief Complaint   Patient presents with    Nausea/Vomiting/Diarrhea     Here via [facility] with c/o n/v/d x 1.5 hours, pt discharged from [facility] yesterday morning for stroke, recently started taking Xarelto      Ivet Crabtree is a 73 y.o female with a PMHx of HTN, CVA, MI, who presents to the ED foe evaluation of vomiting beginning at 0200 today. Patient reports complaints of nausea, vomiting, and diarrhea, since 0200, endorsing some associated abdominal pain that subsided after emesis episodes and bowel movements. Patient also notes she had a stroke yesterday at 1600, noting some residual left upper extremity weakness and slurred speech since. Reports compliance with xarelto since  evening. No other medications taken PTA. No alleviating or exacerbating factors noted. Denies CP, SOB, numbness, headache, dysuria, hematuria, melena, hematochezia, or other associated symptoms.     The history is provided by the patient. No  was used.   Review of patient's allergies indicates:   Allergen Reactions    Talwin [pentazocine lactate] Shortness Of Breath    Ace inhibitors Other (See Comments)     cough     Past Medical History:   Diagnosis Date    Anxiety     Depression     Heart attack     12 years ago    History of heart attack     Hypertension     Stroke due to occlusion of right middle cerebral artery 2023     Past Surgical History:   Procedure Laterality Date     SECTION      EYE SURGERY Bilateral     CATARACT     Family History   Problem Relation Age of Onset    Diabetes Mother     Heart disease Mother     Hypertension Mother     Diabetes Father     Heart disease Father     Diabetes Sister     Heart disease Sister       Social History     Tobacco Use    Smoking status: Every Day     Packs/day: 0.50     Types: Cigarettes     Passive exposure: Past    Smokeless tobacco: Never   Substance Use Topics    Alcohol use: Yes     Comment: seldom    Drug use: No     Review of Systems   Constitutional:  Negative for chills and fever.   HENT:  Negative for facial swelling and sore throat.    Eyes:  Negative for visual disturbance.   Respiratory:  Negative for cough and shortness of breath.    Cardiovascular:  Negative for chest pain and palpitations.   Gastrointestinal:  Positive for abdominal pain, diarrhea, nausea and vomiting. Negative for blood in stool.   Genitourinary:  Negative for dysuria and hematuria.   Musculoskeletal:  Negative for back pain.   Skin:  Negative for rash.   Neurological:  Positive for speech difficulty (since stroke) and weakness (left upper extremity, since stroke). Negative for numbness and headaches.   Hematological:  Does not bruise/bleed easily.   Psychiatric/Behavioral: Negative.       Physical Exam     Initial Vitals [04/11/23 0436]   BP Pulse Resp Temp SpO2   (!) 140/80 60 16 98.6 °F (37 °C) 98 %      MAP       --         Physical Exam    Nursing note and vitals reviewed.  Constitutional: She appears well-developed and well-nourished. She is not diaphoretic. No distress.   HENT:   Head: Normocephalic and atraumatic.   Right Ear: External ear normal.   Left Ear: External ear normal.   Nose: Nose normal.   Eyes: Conjunctivae and EOM are normal. Pupils are equal, round, and reactive to light. No scleral icterus. Right eye exhibits no nystagmus. Left eye exhibits no nystagmus.   Neck: Neck supple. No tracheal deviation present.   Normal range of motion.  Cardiovascular:  Normal rate, regular rhythm and normal heart sounds.           Pulses:       Radial pulses are 2+ on the right side and 2+ on the left side.        Dorsalis pedis pulses are 2+ on the right side and 2+ on the left side.   Pulmonary/Chest:  Breath sounds normal. No respiratory distress.   Abdominal: Abdomen is soft. Bowel sounds are normal. no abdominal tenderness   Musculoskeletal:      Cervical back: Normal range of motion and neck supple.     Neurological: She is alert and oriented to person, place, and time. She has normal strength. No cranial nerve deficit or sensory deficit. GCS score is 15. GCS eye subscore is 4. GCS verbal subscore is 5. GCS motor subscore is 6.   Weakness to the left upper extremity and left shoulder with shrug. Mild dysarthria.    Skin: Skin is warm and dry.   Psychiatric: She has a normal mood and affect. Thought content normal.       ED Course   Procedures  Labs Reviewed   CBC W/ AUTO DIFFERENTIAL - Abnormal; Notable for the following components:       Result Value    WBC 18.50 (*)     RBC 5.51 (*)     Hemoglobin 16.9 (*)     Hematocrit 65.8 (*)      (*)     MCHC 25.7 (*)     RDW 15.3 (*)     Immature Granulocytes 0.6 (*)     Gran # (ANC) 14.8 (*)     Immature Grans (Abs) 0.11 (*)     Mono # 1.2 (*)     Eos # 0.8 (*)     Gran % 80.0 (*)     Lymph % 8.5 (*)     All other components within normal limits   COMPREHENSIVE METABOLIC PANEL - Abnormal; Notable for the following components:    Glucose 179 (*)     Total Bilirubin 1.4 (*)     eGFR 59 (*)     All other components within normal limits   URINALYSIS, REFLEX TO URINE CULTURE - Abnormal; Notable for the following components:    Appearance, UA Hazy (*)     Protein, UA 1+ (*)     Ketones, UA Trace (*)     Occult Blood UA Trace (*)     Urobilinogen, UA 2.0-3.0 (*)     Leukocytes, UA 3+ (*)     All other components within normal limits    Narrative:     Specimen Source->Urine   URINALYSIS MICROSCOPIC - Abnormal; Notable for the following components:    RBC, UA 10 (*)     WBC, UA >100 (*)     WBC Clumps, UA Occasional (*)     Bacteria Many (*)     All other components within normal limits    Narrative:     Specimen Source->Urine   POCT GLUCOSE - Abnormal; Notable for the  following components:    POCT Glucose 178 (*)     All other components within normal limits   CULTURE, URINE   LIPASE   MAGNESIUM          Imaging Results              CT Abdomen Pelvis  Without Contrast (Final result)  Result time 04/11/23 08:25:31      Final result by Karl Barba Jr., MD (04/11/23 08:25:31)                   Impression:      Nonobstructing left nephrolithiasis    Distal esophageal wall thickening may indicate esophagitis    Hepatic steatosis and colonic diverticulosis      Electronically signed by: Karl Calderón Jr  Date:    04/11/2023  Time:    08:25               Narrative:    EXAMINATION:  CT ABDOMEN PELVIS WITHOUT CONTRAST    CLINICAL HISTORY:  Abdominal pain, acute, nonlocalized;    TECHNIQUE:  Low dose axial images, sagittal and coronal reformations were obtained from the lung bases to the pubic symphysis.  Contrast was not administered.    COMPARISON:  None    FINDINGS:  Lung Bases: Unremarkable.    Images of the upper abdomen degraded by motion artifact.    Kidneys/ Ureters: 2 mm nonobstructing nephrolith left kidney.  No ureteral stones or hydronephrosis on either side.    Liver: Fatty infiltrated but otherwise grossly unremarkable.    Gallbladder: No calcified gallstones.    Bile Ducts: No evidence of dilated ducts.    Pancreas: No mass or peripancreatic fat stranding.    Spleen: Unremarkable.    Adrenals: Unremarkable.    Pelvic organs: Unremarkable.    GI Tract/Mesentery: Distal esophageal circumferential wall thickening may be related to esophagitis.  Sigmoid diverticulosis without evidence of diverticulitis.  Appendix normal.  No small bowel dilatation.    Retroperitoneum: No significant adenopathy.    Vasculature: Aortic atherosclerosis is present.    Bones: Unremarkable.                                       CT Head Without Contrast (Final result)  Result time 04/11/23 06:13:12      Final result by Vic Escobar MD (04/11/23 06:13:12)                   Impression:       No CT evidence of acute intracranial abnormality.    Evolving recent infarcts in the right cerebral hemisphere and chronic microvascular ischemic changes.    If the patient has an acute, focal neurological deficit, MRI of the brain may be indicated.      Electronically signed by: Vic Escobar MD  Date:    04/11/2023  Time:    06:13               Narrative:    EXAMINATION:  CT HEAD WITHOUT CONTRAST    CLINICAL HISTORY:  NV, recent Cva;    TECHNIQUE:  Low dose axial images were obtained through the head.  Coronal and sagittal reformations were also performed. Contrast was not administered.    COMPARISON:  04/06/2023 and 04/07/2023.    FINDINGS:  Exam quality is limited by motion.    Evolving recent infarcts in the right cerebral hemisphere as seen on prior MRI.  Patchy periventricular white matter hypoattenuation suggestive of chronic microvascular ischemic change.    No evidence of acute territorial infarct, hemorrhage, mass effect, or midline shift.    Ventricles are normal in size and configuration.    No displaced calvarial fracture.    Minimal mucosal thickening in the paranasal sinuses.  No air-fluid levels.  Mastoid air cells are essentially clear.                                       Medications   ondansetron injection 4 mg (4 mg Intravenous Given 4/11/23 0508)   sodium chloride 0.9% bolus 1,000 mL 1,000 mL (0 mLs Intravenous Stopped 4/11/23 0635)   cefTRIAXone (ROCEPHIN) 1 g/50 mL D5W IVPB (0 g Intravenous Stopped 4/11/23 0941)     Medical Decision Making:   History:   Old Medical Records: I decided to obtain old medical records.  Clinical Tests:   Lab Tests: Ordered and Reviewed        Scribe Attestation:   Scribe #1: I performed the above scribed service and the documentation accurately describes the services I performed. I attest to the accuracy of the note.      ED Course as of 04/11/23 1150   Tue Apr 11, 2023   0518 WBC(!): 18.50  Patient discharged on prednisone as well as appears to be volume  contracted.  Will fluid resuscitate. [CC]   2334 Dr. Fritz Addendum:  Patient signed out to me by Dr. Sexton  In short, pt presented with nausea, vomiting, diarrhea.  Was pending urinalysis which reveals UTI.  CT scan of the abdomen pelvis with nonobstructing left nephrolithiasis as well as distal esophageal wall thickening suggestive of esophagitis.  Patient given antibiotics for UTI.  Daughter reports that she is unable to care for her mother.  Social work consult placed. [SG]   8214 Patient discussed case with social work who referred her for home health.  Patient received IV ceftriaxone.  She remains clinically stable in the emergency department.  She is tolerated p.o. without difficulty.  She is fit for discharge to complete course of Keflex.  Patient also reports that she is not currently taking Protonix.  She has been given refill for Protonix as well as prescription for Zofran.  Referred to primary physician for follow-up.  Patient and family member at the bedside counseled on supportive care, appropriate medication usage, concerning symptoms for which to return to ER and the importance of follow up. Understanding and agreement with treatment plan was expressed.  [SG]      ED Course User Index  [CC] Jesus Sexton MD  [SG] Chito Fritz MD          This chart was completed using dictation software, as a result there may be some transcription errors.        Clinical Impression:   Final diagnoses:  [N39.0] Urinary tract infection without hematuria, site unspecified (Primary)        ED Disposition Condition    Discharge Stable          ED Prescriptions       Medication Sig Dispense Start Date End Date Auth. Provider    cephALEXin (KEFLEX) 500 MG capsule Take 1 capsule (500 mg total) by mouth every 12 (twelve) hours. for 7 days 14 capsule 4/11/2023 4/18/2023 Chito Fritz MD    pantoprazole (PROTONIX) 40 MG tablet Take 1 tablet (40 mg total) by mouth once daily. 30 tablet 4/11/2023 4/10/2024 Chito  DEBORAH Fritz MD    ondansetron (ZOFRAN-ODT) 4 MG TbDL Take 1 tablet (4 mg total) by mouth every 6 (six) hours as needed (Nausea or vomiting). 20 tablet 4/11/2023 -- Chito Fritz MD          Follow-up Information       Follow up With Specialties Details Why Contact Info    [facility]  Follow up Home Health- please call upon arrival to home to begin services as soon as possible [address] [place] [id number] [phone]    Zoie Garcia MD Internal Medicine Schedule an appointment as soon as possible for a visit   [address] [place] [id number] [phone]               Chito Fritz MD  04/12/23 7333

## 2023-04-11 NOTE — TELEPHONE ENCOUNTER
Called pt regarding triage note that I received. Spoke w pt's daughter who stated that she is doing better and was currently in the hospital. Daughter stated that pt had come home from the hospital after stroke and began vomiting and having diarrhe. Pt was dx w UTI and is being discharged today. Since she was being discharged, I scheduled HOF appt. Pt's daughter asked to be scheduled first week of May. Pt scheduled for 5/2 at 1pm with Dr. Mir and pt confirmed date/time of appt.

## 2023-04-11 NOTE — TELEPHONE ENCOUNTER
"Pt daughter calling, states pt was discharged from [facility] last few hours, vomiting and diarrhea since getting home. Pt states feels weak, lightheaded. Per protocol, go to ED now. Daughter states not sure if she will be able to get pt dressed or in car due to vomiting and diarrhea. Advised to call  if further assistance is needed. Daughter verbalizes understanding.   Reason for Disposition   [1] Drinking very little AND [2] dehydration suspected (e.g., no urine > 12 hours, very dry mouth, very lightheaded)    Additional Information   Negative: SEVERE difficulty breathing (e.g., struggling for each breath, speaks in single words)   Negative: Shock suspected (e.g., cold/pale/clammy skin, too weak to stand, low BP, rapid pulse)   Negative: Difficult to awaken or acting confused (e.g., disoriented, slurred speech)   Negative: [1] Fainted > 15 minutes ago AND [2] still feels too weak or dizzy to stand   Negative: [1] SEVERE weakness (i.e., unable to walk or barely able to walk, requires support) AND [2] new-onset or worsening   Negative: Sounds like a life-threatening emergency to the triager   Negative: Difficulty breathing   Negative: Heart beating < 50 beats per minute OR > 140 beats per minute   Negative: Extra heart beats OR irregular heart beating  (i.e., "palpitations")   Negative: Follows bleeding (e.g., from vomiting, rectum, vagina; Exception: small brief weakness from sight of a small amount blood)   Negative: Black or tarry bowel movements    Protocols used: Weakness (Generalized) and Fatigue-A-AH    "

## 2023-04-11 NOTE — ED TRIAGE NOTES
Ivet Crabtree  is a 73 y.o female with PMHx of HTN, anxiety, depression, and MI to ED c/o n/v/d that started at 0200.  Pt was recently discharged from [facility] for a stroke that occurred on 4/6/23.  Left sided weakness remains after stroke. Recently started taking Xarelto.  Denies any current pain.

## 2023-04-11 NOTE — DISCHARGE INSTRUCTIONS
Complete entire course of antibiotics prescribed for urinary tract infection.  Be sure to take pantoprazole daily for acid reflux symptoms.  Use Zofran as needed for nausea.  Follow-up with home health as well as your primary physician.  Return to the emergency department for any new, worsening or significantly concerning symptoms.    Thank you for coming to our Emergency Department today. It is important to remember that some problems are difficult to diagnose and may not be found during your first visit. Be sure to follow up with your primary care doctor and review any labs/imaging that was performed with them. If you do not have a primary care doctor, you may contact the one listed on your discharge paperwork or you may also call the [facility] Appointment Desk at [phone] to schedule an appointment with one.     All medications may potentially have side effects and it is impossible to predict which medications may give you side effects. If you feel that you are having a negative effect of any medication you should immediately stop taking them and seek medical attention.    Return to the ER with any questions/concerns, new/concerning symptoms, worsening or failure to improve. Do not drive or make any important decisions for 24 hours if you have received any pain medications, sedatives or mood altering drugs during your ER visit.

## 2023-04-12 PROCEDURE — G0180 PR HOME HEALTH MD CERTIFICATION: ICD-10-PCS | Mod: ,,, | Performed by: STUDENT IN AN ORGANIZED HEALTH CARE EDUCATION/TRAINING PROGRAM

## 2023-04-12 PROCEDURE — G0180 MD CERTIFICATION HHA PATIENT: HCPCS | Mod: ,,, | Performed by: STUDENT IN AN ORGANIZED HEALTH CARE EDUCATION/TRAINING PROGRAM

## 2023-04-12 NOTE — PROGRESS NOTES
C3 nurse spoke with Ivet Crabtree for a TCC post hospital discharge follow up call. The patient has a scheduled HOSFU appointment with Zoie Garcia MD on 04/13/23 @ 1030.        Unable to route to pcp/staff or send secure message. Advised pt (see notes)

## 2023-04-13 LAB — BACTERIA UR CULT: ABNORMAL

## 2023-04-17 ENCOUNTER — PATIENT OUTREACH (OUTPATIENT)
Dept: ADMINISTRATIVE | Facility: OTHER | Age: 73
End: 2023-04-17
Payer: COMMERCIAL

## 2023-04-17 NOTE — PROGRESS NOTES
CHW - Case Closure    This Community Health Worker spoke to patient via telephone today.   Pt reported: Patient continues to have no needs at this time.  Pt denied any additional needs at this time and agrees with episode closure at this time.  Provided patient with Community Health Worker's contact information and encouraged her to contact this Community Health Worker if additional needs arise.

## 2023-05-02 ENCOUNTER — OFFICE VISIT (OUTPATIENT)
Dept: NEUROLOGY | Facility: CLINIC | Age: 73
End: 2023-05-02
Payer: COMMERCIAL

## 2023-05-02 VITALS
HEIGHT: 66 IN | BODY MASS INDEX: 34.86 KG/M2 | DIASTOLIC BLOOD PRESSURE: 82 MMHG | SYSTOLIC BLOOD PRESSURE: 173 MMHG | WEIGHT: 216.94 LBS | HEART RATE: 53 BPM

## 2023-05-02 DIAGNOSIS — I67.848 OTHER CEREBROVASCULAR VASOSPASM AND VASOCONSTRICTION: ICD-10-CM

## 2023-05-02 DIAGNOSIS — I63.511 STROKE DUE TO OCCLUSION OF RIGHT MIDDLE CEREBRAL ARTERY: Primary | ICD-10-CM

## 2023-05-02 PROCEDURE — 3079F PR MOST RECENT DIASTOLIC BLOOD PRESSURE 80-89 MM HG: ICD-10-PCS | Mod: CPTII,S$GLB,, | Performed by: STUDENT IN AN ORGANIZED HEALTH CARE EDUCATION/TRAINING PROGRAM

## 2023-05-02 PROCEDURE — 1101F PR PT FALLS ASSESS DOC 0-1 FALLS W/OUT INJ PAST YR: ICD-10-PCS | Mod: CPTII,S$GLB,, | Performed by: STUDENT IN AN ORGANIZED HEALTH CARE EDUCATION/TRAINING PROGRAM

## 2023-05-02 PROCEDURE — 3077F SYST BP >= 140 MM HG: CPT | Mod: CPTII,S$GLB,, | Performed by: STUDENT IN AN ORGANIZED HEALTH CARE EDUCATION/TRAINING PROGRAM

## 2023-05-02 PROCEDURE — 99999 PR PBB SHADOW E&M-EST. PATIENT-LVL III: CPT | Mod: PBBFAC,,, | Performed by: STUDENT IN AN ORGANIZED HEALTH CARE EDUCATION/TRAINING PROGRAM

## 2023-05-02 PROCEDURE — 1159F PR MEDICATION LIST DOCUMENTED IN MEDICAL RECORD: ICD-10-PCS | Mod: CPTII,S$GLB,, | Performed by: STUDENT IN AN ORGANIZED HEALTH CARE EDUCATION/TRAINING PROGRAM

## 2023-05-02 PROCEDURE — 1101F PT FALLS ASSESS-DOCD LE1/YR: CPT | Mod: CPTII,S$GLB,, | Performed by: STUDENT IN AN ORGANIZED HEALTH CARE EDUCATION/TRAINING PROGRAM

## 2023-05-02 PROCEDURE — 1111F PR DISCHARGE MEDS RECONCILED W/ CURRENT OUTPATIENT MED LIST: ICD-10-PCS | Mod: CPTII,S$GLB,, | Performed by: STUDENT IN AN ORGANIZED HEALTH CARE EDUCATION/TRAINING PROGRAM

## 2023-05-02 PROCEDURE — 3077F PR MOST RECENT SYSTOLIC BLOOD PRESSURE >= 140 MM HG: ICD-10-PCS | Mod: CPTII,S$GLB,, | Performed by: STUDENT IN AN ORGANIZED HEALTH CARE EDUCATION/TRAINING PROGRAM

## 2023-05-02 PROCEDURE — 3051F PR MOST RECENT HEMOGLOBIN A1C LEVEL 7.0 - < 8.0%: ICD-10-PCS | Mod: CPTII,S$GLB,, | Performed by: STUDENT IN AN ORGANIZED HEALTH CARE EDUCATION/TRAINING PROGRAM

## 2023-05-02 PROCEDURE — 3051F HG A1C>EQUAL 7.0%<8.0%: CPT | Mod: CPTII,S$GLB,, | Performed by: STUDENT IN AN ORGANIZED HEALTH CARE EDUCATION/TRAINING PROGRAM

## 2023-05-02 PROCEDURE — 99215 OFFICE O/P EST HI 40 MIN: CPT | Mod: S$GLB,,, | Performed by: STUDENT IN AN ORGANIZED HEALTH CARE EDUCATION/TRAINING PROGRAM

## 2023-05-02 PROCEDURE — 3008F PR BODY MASS INDEX (BMI) DOCUMENTED: ICD-10-PCS | Mod: CPTII,S$GLB,, | Performed by: STUDENT IN AN ORGANIZED HEALTH CARE EDUCATION/TRAINING PROGRAM

## 2023-05-02 PROCEDURE — 1159F MED LIST DOCD IN RCRD: CPT | Mod: CPTII,S$GLB,, | Performed by: STUDENT IN AN ORGANIZED HEALTH CARE EDUCATION/TRAINING PROGRAM

## 2023-05-02 PROCEDURE — 3288F PR FALLS RISK ASSESSMENT DOCUMENTED: ICD-10-PCS | Mod: CPTII,S$GLB,, | Performed by: STUDENT IN AN ORGANIZED HEALTH CARE EDUCATION/TRAINING PROGRAM

## 2023-05-02 PROCEDURE — 1126F PR PAIN SEVERITY QUANTIFIED, NO PAIN PRESENT: ICD-10-PCS | Mod: CPTII,S$GLB,, | Performed by: STUDENT IN AN ORGANIZED HEALTH CARE EDUCATION/TRAINING PROGRAM

## 2023-05-02 PROCEDURE — 1126F AMNT PAIN NOTED NONE PRSNT: CPT | Mod: CPTII,S$GLB,, | Performed by: STUDENT IN AN ORGANIZED HEALTH CARE EDUCATION/TRAINING PROGRAM

## 2023-05-02 PROCEDURE — 99999 PR PBB SHADOW E&M-EST. PATIENT-LVL III: ICD-10-PCS | Mod: PBBFAC,,, | Performed by: STUDENT IN AN ORGANIZED HEALTH CARE EDUCATION/TRAINING PROGRAM

## 2023-05-02 PROCEDURE — 1111F DSCHRG MED/CURRENT MED MERGE: CPT | Mod: CPTII,S$GLB,, | Performed by: STUDENT IN AN ORGANIZED HEALTH CARE EDUCATION/TRAINING PROGRAM

## 2023-05-02 PROCEDURE — 3008F BODY MASS INDEX DOCD: CPT | Mod: CPTII,S$GLB,, | Performed by: STUDENT IN AN ORGANIZED HEALTH CARE EDUCATION/TRAINING PROGRAM

## 2023-05-02 PROCEDURE — 3079F DIAST BP 80-89 MM HG: CPT | Mod: CPTII,S$GLB,, | Performed by: STUDENT IN AN ORGANIZED HEALTH CARE EDUCATION/TRAINING PROGRAM

## 2023-05-02 PROCEDURE — 99215 PR OFFICE/OUTPT VISIT, EST, LEVL V, 40-54 MIN: ICD-10-PCS | Mod: S$GLB,,, | Performed by: STUDENT IN AN ORGANIZED HEALTH CARE EDUCATION/TRAINING PROGRAM

## 2023-05-02 PROCEDURE — 3288F FALL RISK ASSESSMENT DOCD: CPT | Mod: CPTII,S$GLB,, | Performed by: STUDENT IN AN ORGANIZED HEALTH CARE EDUCATION/TRAINING PROGRAM

## 2023-05-02 NOTE — PROGRESS NOTES
Vascular Neurology Clinic  Hospital Follow-up Clinic Visit    Patient Name: Ivet Crabtree  MRN: [id number]  Date: 5/2/23  Referring Provider: No ref. provider found    CC: Ischemic stroke    SUBJECTIVE:      History of Present Illness: Ivet Crabtree is a 73 y.o. female with a past medical history significant for CAD, HTN, DM, COPD, anxiety, prior tobacco use who presents to clinic for follow-up for a recent admission for stroke.     She was admitted to [facility] after presenting with left facial droop and slurred speech. OOW for IV thrombolytics. MRI showed right hemispheric watershed pattern infarctions. CTA head/neck showed short-segment occlusion/high grade stenosis of the right superior M2. She was started on DAPT, statin for secondary stroke prevention.      She was discharged on 04/06/2023. Returned to  ED with recurrent symptoms. Repeat MRI showed new infarcts in the right hemisphere. She was transferred to [facility] for close observation. Repeat CTA showed stable appearance of the right M2 stenosis/occlusion. Her hospital course was complicated by atrial fibrillation with RVR. She was started on rivaroxaban 20mg for secondary stroke prevention. She was discharged to home with  PT/OT/SLP.     Since she was discharged, she denies new or worsening symptoms concerning for stroke or TIA. Her left hand used to shake from ET, but the tremor is gone now. She has been discharged from PT. She continues to participate in OT. 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      SBP high today, but 100s-150s per her notebook.       Work-up:    Imaging:  - CTA head and neck (04/07/2023):   ---- CTA head: Stable focal high-grade stenosis or short segment occlusion right proximal M2 MCA. No evidence for significant proximal high-grade stenosis or occlusion.   ---- CTA neck: Noncalcified plaquing of the carotid bifurcations and proximal ICAs with less than 50% proximal ICA stenosis by NASCET criteria.   ----  CT head: Developing hypoattenuation within the right MCA territory compatible with evolving region of infarction seen on MRI.  No significant mass effect or hemorrhagic conversion    - MRI brain without contrast (04/06/2023):   ---- 1. Since prior MRI of the brain performed 04/03/2023, there is new restricted diffusion and FLAIR signal abnormality in the right frontal operculum compatible with acute to early subacute ischemia.  These findings are in close proximity to previously identified additional evolving early subacute infarcts elsewhere in the frontal lobe, insula, and frontal parietal white matter.  ---- 2. Additionally, there is a questionable new punctate area of restricted diffusion the right inferior jugular white matter at the level of the atrium of the lateral ventricle.  ---- 3. Questionable increased FLAIR signal within the M2 branches of the right MCA within the sylvian fissure.  Finding nonspecific, potentially artifactual, though slow and/or disorganized flow related to proximal steno-occlusive disease is additionally considered.  CTA or MRA could be performed for further characterization as warranted.  This report was flagged in Epic as abnormal.    Cardiac Evaluation:  - TTE (04/04/2023):  The left ventricle is normal in size with mild concentric hypertrophy and normal systolic function.  The estimated ejection fraction is 70%.  Normal right ventricular size with normal right ventricular systolic function.  The estimated PA systolic pressure is 34 mmHg.  No intracardiac source of embolus noted on this exam. If high clinical suspicion, consider SAI +/- bubble study.  The left atrial volume index is normal. Left atrial volume index is 28.9 mL/m2.    Labs:  Recent Labs   Lab 04/04/23  0449 04/06/23  0804   Hemoglobin A1C 7.2 H  --    LDL Cholesterol  --  55.0 L   HDL  --  51   Triglycerides  --  120   Cholesterol  --  130         Review of Systems: The following systems were reviewed with pertinent  positives and negatives documented in the HPI: constitutional, eyes, CV, respiratory, GI, , musculoskeletal, skin, neurological, psychiatric    Past Medical History:  Past Medical History:   Diagnosis Date    Anxiety     Depression     Heart attack     12 years ago    History of heart attack 2001    Hypertension     Stroke due to occlusion of right middle cerebral artery 4/6/2023       Medications:    Current Outpatient Medications:     albuterol (VENTOLIN HFA) 90 mcg/actuation inhaler, Inhale 2 puffs into the lungs every 4 (four) hours as needed for Wheezing or Shortness of Breath. Rescue (Patient taking differently: Inhale 2 puffs into the lungs every 6 (six) hours as needed for Wheezing or Shortness of Breath. Rescue), Disp: 1 Inhaler, Rfl: 0    atorvastatin (LIPITOR) 40 MG tablet, Take 2 tablets (80 mg total) by mouth once daily., Disp: , Rfl:     EPINEPHrine (EPIPEN JR) 0.15 mg/0.3 mL pen injection, Inject 0.15 mg into the muscle., Disp: , Rfl:     fluticasone propionate (FLONASE) 50 mcg/actuation nasal spray, 1 spray by Each Nostril route once daily., Disp: , Rfl:     metoprolol tartrate (LOPRESSOR) 50 MG tablet, Take 1 tablet (50 mg total) by mouth 2 (two) times daily. Restart on 4/6/23, Disp: , Rfl:     nitroGLYCERIN (NITROSTAT) 0.4 MG SL tablet, Place 0.4 mg under the tongue., Disp: , Rfl:     ondansetron (ZOFRAN-ODT) 4 MG TbDL, Take 1 tablet (4 mg total) by mouth every 6 (six) hours as needed (Nausea or vomiting)., Disp: 20 tablet, Rfl: 0    pantoprazole (PROTONIX) 40 MG tablet, Take 40 mg by mouth Daily., Disp: , Rfl:     pantoprazole (PROTONIX) 40 MG tablet, Take 1 tablet (40 mg total) by mouth once daily., Disp: 30 tablet, Rfl: 0    potassium chloride (KLOR-CON) 10 MEQ TbSR, Take 10 mEq by mouth Daily., Disp: , Rfl:     predniSONE (DELTASONE) 20 MG tablet, Take 2 tablets (40 mg total) by mouth once daily. (Patient not taking: Reported on 4/12/2023), Disp: 4 tablet, Rfl: 0    rivaroxaban (XARELTO) 20  mg Tab, Take 1 tablet (20 mg total) by mouth daily with dinner or evening meal., Disp: 60 tablet, Rfl: 0    venlafaxine (EFFEXOR-XR) 75 MG 24 hr capsule, Take 75 mg by mouth Daily. Take the 75 mg tab po qd with the 150 mg tab po qd.  Total dose 225 mg po qd., Disp: , Rfl:   Any other notable medications as documented in HPI.    Allergies:  Review of patient's allergies indicates:   Allergen Reactions    Talwin [pentazocine lactate] Shortness Of Breath    Ace inhibitors Other (See Comments)     cough       Social History:  Social History     Socioeconomic History    Marital status:    Tobacco Use    Smoking status: Former     Packs/day: 0.50     Types: Cigarettes     Passive exposure: Past    Smokeless tobacco: Never    Tobacco comments:     Q   Substance and Sexual Activity    Alcohol use: Yes     Comment: seldom    Drug use: No    Sexual activity: Not Currently     Social Determinants of Health     Financial Resource Strain: Low Risk     Difficulty of Paying Living Expenses: Not hard at all   Food Insecurity: No Food Insecurity    Worried About Running Out of Food in the Last Year: Never true    Ran Out of Food in the Last Year: Never true   Transportation Needs: No Transportation Needs    Lack of Transportation (Medical): No    Lack of Transportation (Non-Medical): No   Physical Activity: Inactive    Days of Exercise per Week: 0 days    Minutes of Exercise per Session: 0 min   Stress: No Stress Concern Present    Feeling of Stress : Not at all   Social Connections: Socially Isolated    Frequency of Communication with Friends and Family: More than three times a week    Frequency of Social Gatherings with Friends and Family: More than three times a week    Attends [employer] Services: Never    Active Member of Clubs or Organizations: No    Attends Club or Organization Meetings: Never    Marital Status:    Housing Stability: Low Risk     Unable to Pay for Housing in the Last Year: No    Number of Places  Lived in the Last Year: 1    Unstable Housing in the Last Year: No     Any other notable Social History as documented in HPI.    Family History:  Family History   Problem Relation Age of Onset    Diabetes Mother     Heart disease Mother     Hypertension Mother     Diabetes Father     Heart disease Father     Diabetes Sister     Heart disease Sister      Any other notable FMH as documented in HPI.      OBJECTIVE:     Physical Exam:   Vitals:    05/02/23 1312   BP: (!) 173/82   Pulse: (!) 53       GEN: NAD, pleasant, cooperative  CV: RRR  PULM: No signs of resp distress, on room air  EXT: No cyanosis, clubbing, or edema.    NEURO:  Mental status: The patient is alert, attentive, and oriented to self, age, month, year  Speech: Fluent speech with intact repetition, comprehension, and naming. Phonation is normal.    Cranial nerves:  CN II: Visual fields are full to confrontation. Pupils are 3mm and reactive to light OU.  CN III, IV, VI: EOMI, no nystagmus, no ptosis  CN V: Facial sensation is intact in all 3 divisions bilaterally.  CN VII: Face is symmetric with normal eye closure and smile.  CN VIII: Hearing is normal bilaterally.  CN IX, X: Palate elevates symmetrically.   CN XI: Head turning and shoulder shrug are intact.  CN XII: Tongue is midline with normal movements and no atrophy.    Motor: Muscle bulk and tone are normal. No pronator drift.  -- RUE: 4+/5 shoulder abduction, 5-/5 elbow extension/flexion  -- RLE: 5-/5 hip flexion, otherwise 5/5  -- LUE: 5/5  -- LLE: 5/5    Reflexes: Reflexes are 2+ and symmetric at the biceps, triceps, knees, and ankles.    Sensory: Light touch is intact in bilateral upper and lower extremities. Absent romberg.    Coordination: Rapid alternating movements and fine finger movements are intact. There is no dysmetria on finger-to-nose and heel-knee-shin. There are no abnormal or extraneous movements.    Gait/Stance: Posture is normal. Gait is steady with normal steps, base, arm  swing, and turning. Heel and toe walking are normal. Tandem gait is normal.      ASSESSMENT/PLAN:     Diagnosis/Etiology: Right hemispheric infarctions with short-segment occlusion vs high-grade stenosis of the right superior M2 on CTA. Etiology cardioembolic (given atrial fibrillation with RVR during her hospital admission) vs FREDERICK (watershed pattern of infarctions, stuttering symptoms)  Stroke Risk Factors: HTN, DM, HLD, prior tobacco use  Effects of Stroke: Mild left hemiparesis    Recommendations:   - Additional studies: None  - Antiplatelet/Anticoagulation: Continue rivaroxaban 20mg qHS  - Lipid Management: Target is LDL < 70mg/dL. Continue atorvastatin 40mg.   - Diabetes: Target hemoglobin A1c <7%, measured 2X/year or quarterly if not meeting goals  - Hypertension: Target systolic BP <130mmHg. /82 today, but are -150 at home. Recommend close follow-up with her PCP for management of her blood pressures.   - Smoking: Prior heavy tobacco use. Quit 2019.  - Weight: Target BMI is <25kg/m2 if BMI is > 25-27 kg/m2; if BMI >27kg/m2 10% weight loss is suggested over next 6 months. Dietary consult is suggested to assist in weight control.  - Therapy: Continue HH OT.  - Follow-up: RTC in 3-6 months. Recommend close follow-up with their PCP for monitoring and management of the above vascular risk factors as needed to meet goals.       Problem List Items Addressed This Visit          Neuro    Stroke due to occlusion of right middle cerebral artery - Primary     Other Visit Diagnoses       Other cerebrovascular vasospasm and vasoconstriction                  Maria Elena Mir MD, PhD  [facility]  Department of Vascular Neurology

## 2023-05-02 NOTE — LETTER
May 2, 2023    Ivet Crabtree  [address] [place] [id number]             [facility] - Neurology 8th Fl  [address]  [facility] [address]  Phone: [phone]  Fax: [phone] To Whom It May Concern    Ivet Crabtree is a patient under my care at [facility] - for ischemic stroke that occurred on 04/03/2023. She has recovered well and is cleared from a neurologic standpoint to resume her work duties on 05/08/2023.     If you have any questions or concerns, please don't hesitate to call.    Sincerely,        Maria Elena Mir MD, PhD

## 2023-05-17 ENCOUNTER — DOCUMENT SCAN (OUTPATIENT)
Dept: HOME HEALTH SERVICES | Facility: HOSPITAL | Age: 73
End: 2023-05-17
Payer: COMMERCIAL

## 2023-05-18 ENCOUNTER — EXTERNAL HOME HEALTH (OUTPATIENT)
Dept: HOME HEALTH SERVICES | Facility: HOSPITAL | Age: 73
End: 2023-05-18
Payer: COMMERCIAL

## 2023-06-12 ENCOUNTER — DOCUMENT SCAN (OUTPATIENT)
Dept: HOME HEALTH SERVICES | Facility: HOSPITAL | Age: 73
End: 2023-06-12
Payer: COMMERCIAL

## 2023-07-10 PROBLEM — I63.511 STROKE DUE TO OCCLUSION OF RIGHT MIDDLE CEREBRAL ARTERY: Status: RESOLVED | Noted: 2023-04-06 | Resolved: 2023-07-10

## 2023-07-10 PROBLEM — I63.9 ACUTE CVA (CEREBROVASCULAR ACCIDENT): Status: RESOLVED | Noted: 2023-04-03 | Resolved: 2023-07-10

## 2023-10-30 ENCOUNTER — OFFICE VISIT (OUTPATIENT)
Dept: NEUROLOGY | Facility: CLINIC | Age: 73
End: 2023-10-30
Payer: COMMERCIAL

## 2023-10-30 VITALS
HEART RATE: 53 BPM | SYSTOLIC BLOOD PRESSURE: 147 MMHG | HEIGHT: 66 IN | DIASTOLIC BLOOD PRESSURE: 84 MMHG | BODY MASS INDEX: 34.86 KG/M2 | WEIGHT: 216.94 LBS

## 2023-10-30 DIAGNOSIS — E66.01 SEVERE OBESITY (BMI 35.0-39.9) WITH COMORBIDITY: Primary | ICD-10-CM

## 2023-10-30 DIAGNOSIS — Z86.73 HISTORY OF STROKE: ICD-10-CM

## 2023-10-30 PROCEDURE — 3079F DIAST BP 80-89 MM HG: CPT | Mod: CPTII,S$GLB,, | Performed by: STUDENT IN AN ORGANIZED HEALTH CARE EDUCATION/TRAINING PROGRAM

## 2023-10-30 PROCEDURE — 3051F PR MOST RECENT HEMOGLOBIN A1C LEVEL 7.0 - < 8.0%: ICD-10-PCS | Mod: CPTII,S$GLB,, | Performed by: STUDENT IN AN ORGANIZED HEALTH CARE EDUCATION/TRAINING PROGRAM

## 2023-10-30 PROCEDURE — 4010F PR ACE/ARB THEARPY RXD/TAKEN: ICD-10-PCS | Mod: CPTII,S$GLB,, | Performed by: STUDENT IN AN ORGANIZED HEALTH CARE EDUCATION/TRAINING PROGRAM

## 2023-10-30 PROCEDURE — 3288F FALL RISK ASSESSMENT DOCD: CPT | Mod: CPTII,S$GLB,, | Performed by: STUDENT IN AN ORGANIZED HEALTH CARE EDUCATION/TRAINING PROGRAM

## 2023-10-30 PROCEDURE — 1101F PR PT FALLS ASSESS DOC 0-1 FALLS W/OUT INJ PAST YR: ICD-10-PCS | Mod: CPTII,S$GLB,, | Performed by: STUDENT IN AN ORGANIZED HEALTH CARE EDUCATION/TRAINING PROGRAM

## 2023-10-30 PROCEDURE — 1126F PR PAIN SEVERITY QUANTIFIED, NO PAIN PRESENT: ICD-10-PCS | Mod: CPTII,S$GLB,, | Performed by: STUDENT IN AN ORGANIZED HEALTH CARE EDUCATION/TRAINING PROGRAM

## 2023-10-30 PROCEDURE — 3077F SYST BP >= 140 MM HG: CPT | Mod: CPTII,S$GLB,, | Performed by: STUDENT IN AN ORGANIZED HEALTH CARE EDUCATION/TRAINING PROGRAM

## 2023-10-30 PROCEDURE — 1101F PT FALLS ASSESS-DOCD LE1/YR: CPT | Mod: CPTII,S$GLB,, | Performed by: STUDENT IN AN ORGANIZED HEALTH CARE EDUCATION/TRAINING PROGRAM

## 2023-10-30 PROCEDURE — 3079F PR MOST RECENT DIASTOLIC BLOOD PRESSURE 80-89 MM HG: ICD-10-PCS | Mod: CPTII,S$GLB,, | Performed by: STUDENT IN AN ORGANIZED HEALTH CARE EDUCATION/TRAINING PROGRAM

## 2023-10-30 PROCEDURE — 99999 PR PBB SHADOW E&M-EST. PATIENT-LVL III: CPT | Mod: PBBFAC,,, | Performed by: STUDENT IN AN ORGANIZED HEALTH CARE EDUCATION/TRAINING PROGRAM

## 2023-10-30 PROCEDURE — 3288F PR FALLS RISK ASSESSMENT DOCUMENTED: ICD-10-PCS | Mod: CPTII,S$GLB,, | Performed by: STUDENT IN AN ORGANIZED HEALTH CARE EDUCATION/TRAINING PROGRAM

## 2023-10-30 PROCEDURE — 4010F ACE/ARB THERAPY RXD/TAKEN: CPT | Mod: CPTII,S$GLB,, | Performed by: STUDENT IN AN ORGANIZED HEALTH CARE EDUCATION/TRAINING PROGRAM

## 2023-10-30 PROCEDURE — 3008F BODY MASS INDEX DOCD: CPT | Mod: CPTII,S$GLB,, | Performed by: STUDENT IN AN ORGANIZED HEALTH CARE EDUCATION/TRAINING PROGRAM

## 2023-10-30 PROCEDURE — 1126F AMNT PAIN NOTED NONE PRSNT: CPT | Mod: CPTII,S$GLB,, | Performed by: STUDENT IN AN ORGANIZED HEALTH CARE EDUCATION/TRAINING PROGRAM

## 2023-10-30 PROCEDURE — 1159F PR MEDICATION LIST DOCUMENTED IN MEDICAL RECORD: ICD-10-PCS | Mod: CPTII,S$GLB,, | Performed by: STUDENT IN AN ORGANIZED HEALTH CARE EDUCATION/TRAINING PROGRAM

## 2023-10-30 PROCEDURE — 3051F HG A1C>EQUAL 7.0%<8.0%: CPT | Mod: CPTII,S$GLB,, | Performed by: STUDENT IN AN ORGANIZED HEALTH CARE EDUCATION/TRAINING PROGRAM

## 2023-10-30 PROCEDURE — 99214 PR OFFICE/OUTPT VISIT, EST, LEVL IV, 30-39 MIN: ICD-10-PCS | Mod: S$GLB,,, | Performed by: STUDENT IN AN ORGANIZED HEALTH CARE EDUCATION/TRAINING PROGRAM

## 2023-10-30 PROCEDURE — 99999 PR PBB SHADOW E&M-EST. PATIENT-LVL III: ICD-10-PCS | Mod: PBBFAC,,, | Performed by: STUDENT IN AN ORGANIZED HEALTH CARE EDUCATION/TRAINING PROGRAM

## 2023-10-30 PROCEDURE — 3077F PR MOST RECENT SYSTOLIC BLOOD PRESSURE >= 140 MM HG: ICD-10-PCS | Mod: CPTII,S$GLB,, | Performed by: STUDENT IN AN ORGANIZED HEALTH CARE EDUCATION/TRAINING PROGRAM

## 2023-10-30 PROCEDURE — 1159F MED LIST DOCD IN RCRD: CPT | Mod: CPTII,S$GLB,, | Performed by: STUDENT IN AN ORGANIZED HEALTH CARE EDUCATION/TRAINING PROGRAM

## 2023-10-30 PROCEDURE — 3008F PR BODY MASS INDEX (BMI) DOCUMENTED: ICD-10-PCS | Mod: CPTII,S$GLB,, | Performed by: STUDENT IN AN ORGANIZED HEALTH CARE EDUCATION/TRAINING PROGRAM

## 2023-10-30 PROCEDURE — 99214 OFFICE O/P EST MOD 30 MIN: CPT | Mod: S$GLB,,, | Performed by: STUDENT IN AN ORGANIZED HEALTH CARE EDUCATION/TRAINING PROGRAM

## 2023-10-30 NOTE — PROGRESS NOTES
Vascular Neurology Clinic  Hospital Follow-up Clinic Visit    Patient Name: Ivet Crabtree  MRN: [id number]  Date: 10/30/23  Referring Provider: Aaareferral Self    CC: Ischemic stroke    SUBJECTIVE:      History of Present Illness: Ivet Crabtree is a 73 y.o. female with a past medical history significant for CAD, HTN, DM, COPD, anxiety, prior tobacco use who presents to clinic for follow-up for a recent admission for stroke.     She was admitted to [facility] after presenting with left facial droop and slurred speech. OOW for IV thrombolytics. MRI showed right hemispheric watershed pattern infarctions. CTA head/neck showed short-segment occlusion/high grade stenosis of the right superior M2. She was started on DAPT, statin for secondary stroke prevention.      She was discharged on 04/06/2023. Returned to  ED with recurrent symptoms. Repeat MRI showed new infarcts in the right hemisphere. She was transferred to [facility] for close observation. Repeat CTA showed stable appearance of the right M2 stenosis/occlusion. Her hospital course was complicated by atrial fibrillation with RVR. She was started on rivaroxaban 20mg for secondary stroke prevention. She was discharged to home with  PT/OT/SLP.     Since she was discharged, she denies new or worsening symptoms concerning for stroke or TIA. Her left hand used to shake from ET, but the tremor is gone now. She has been discharged from PT. She continues to participate in OT. 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      Interval History:   She continues to do well. No new focal neurologic s/s.     She presents today at the request of her ophthalmologist. She brings exam reports from the ophthalmologist's office, which I had reviewed by our ophthalmology group with no major findings to suggest a cerebrovascular issues (such as BRAO, CRAO).     She continues on xarelto daily. No side effects of the medication.     Work-up:    Imaging:  - CTA head and  neck (04/07/2023):   ---- CTA head: Stable focal high-grade stenosis or short segment occlusion right proximal M2 MCA. No evidence for significant proximal high-grade stenosis or occlusion.   ---- CTA neck: Noncalcified plaquing of the carotid bifurcations and proximal ICAs with less than 50% proximal ICA stenosis by NASCET criteria.   ---- CT head: Developing hypoattenuation within the right MCA territory compatible with evolving region of infarction seen on MRI.  No significant mass effect or hemorrhagic conversion    - MRI brain without contrast (04/06/2023):   ---- 1. Since prior MRI of the brain performed 04/03/2023, there is new restricted diffusion and FLAIR signal abnormality in the right frontal operculum compatible with acute to early subacute ischemia.  These findings are in close proximity to previously identified additional evolving early subacute infarcts elsewhere in the frontal lobe, insula, and frontal parietal white matter.  ---- 2. Additionally, there is a questionable new punctate area of restricted diffusion the right inferior jugular white matter at the level of the atrium of the lateral ventricle.  ---- 3. Questionable increased FLAIR signal within the M2 branches of the right MCA within the sylvian fissure.  Finding nonspecific, potentially artifactual, though slow and/or disorganized flow related to proximal steno-occlusive disease is additionally considered.  CTA or MRA could be performed for further characterization as warranted.  This report was flagged in Epic as abnormal.    Cardiac Evaluation:  - TTE (04/04/2023):  The left ventricle is normal in size with mild concentric hypertrophy and normal systolic function.  The estimated ejection fraction is 70%.  Normal right ventricular size with normal right ventricular systolic function.  The estimated PA systolic pressure is 34 mmHg.  No intracardiac source of embolus noted on this exam. If high clinical suspicion, consider SAI +/- bubble  study.  The left atrial volume index is normal. Left atrial volume index is 28.9 mL/m2.    Labs:  Recent Labs   Lab 04/04/23  0449 04/06/23  0804   Hemoglobin A1C 7.2 H  --    LDL Cholesterol  --  55.0 L   HDL  --  51   Triglycerides  --  120   Cholesterol  --  130         Review of Systems: The following systems were reviewed with pertinent positives and negatives documented in the HPI: constitutional, eyes, CV, respiratory, GI, , musculoskeletal, skin, neurological, psychiatric    Past Medical History:  Past Medical History:   Diagnosis Date    Anxiety     Depression     Heart attack     12 years ago    History of heart attack 2001    Hypertension     Stroke due to occlusion of right middle cerebral artery 4/6/2023       Medications:    Current Outpatient Medications:     albuterol (VENTOLIN HFA) 90 mcg/actuation inhaler, Inhale 2 puffs into the lungs every 4 (four) hours as needed for Wheezing or Shortness of Breath. Rescue (Patient taking differently: Inhale 2 puffs into the lungs every 6 (six) hours as needed for Wheezing or Shortness of Breath. Rescue), Disp: 1 Inhaler, Rfl: 0    atorvastatin (LIPITOR) 40 MG tablet, Take 2 tablets (80 mg total) by mouth once daily., Disp: , Rfl:     EPINEPHrine (EPIPEN JR) 0.15 mg/0.3 mL pen injection, Inject 0.15 mg into the muscle., Disp: , Rfl:     fluticasone propionate (FLONASE) 50 mcg/actuation nasal spray, 1 spray by Each Nostril route once daily., Disp: , Rfl:     metoprolol tartrate (LOPRESSOR) 50 MG tablet, Take 1 tablet (50 mg total) by mouth 2 (two) times daily. Restart on 4/6/23, Disp: , Rfl:     nitroGLYCERIN (NITROSTAT) 0.4 MG SL tablet, Place 0.4 mg under the tongue., Disp: , Rfl:     ondansetron (ZOFRAN-ODT) 4 MG TbDL, Take 1 tablet (4 mg total) by mouth every 6 (six) hours as needed (Nausea or vomiting)., Disp: 20 tablet, Rfl: 0    pantoprazole (PROTONIX) 40 MG tablet, Take 40 mg by mouth Daily., Disp: , Rfl:     pantoprazole (PROTONIX) 40 MG tablet, Take 1  tablet (40 mg total) by mouth once daily., Disp: 30 tablet, Rfl: 0    potassium chloride (KLOR-CON) 10 MEQ TbSR, Take 10 mEq by mouth Daily., Disp: , Rfl:     predniSONE (DELTASONE) 20 MG tablet, Take 2 tablets (40 mg total) by mouth once daily. (Patient not taking: Reported on 4/12/2023), Disp: 4 tablet, Rfl: 0    rivaroxaban (XARELTO) 20 mg Tab, Take 1 tablet (20 mg total) by mouth daily with dinner or evening meal., Disp: 60 tablet, Rfl: 0    venlafaxine (EFFEXOR-XR) 75 MG 24 hr capsule, Take 75 mg by mouth Daily. Take the 75 mg tab po qd with the 150 mg tab po qd.  Total dose 225 mg po qd., Disp: , Rfl:   Any other notable medications as documented in HPI.    Allergies:  Review of patient's allergies indicates:   Allergen Reactions    Talwin [pentazocine lactate] Shortness Of Breath    Ace inhibitors Other (See Comments)     cough       Social History:  Social History     Socioeconomic History    Marital status:    Tobacco Use    Smoking status: Former     Current packs/day: 0.50     Types: Cigarettes     Passive exposure: Past    Smokeless tobacco: Never    Tobacco comments:     Q   Substance and Sexual Activity    Alcohol use: Yes     Comment: seldom    Drug use: No    Sexual activity: Not Currently     Social Determinants of Health     Financial Resource Strain: Low Risk  (4/6/2023)    Overall Financial Resource Strain (CARDIA)     Difficulty of Paying Living Expenses: Not hard at all   Food Insecurity: No Food Insecurity (4/6/2023)    Hunger Vital Sign     Worried About Running Out of Food in the Last Year: Never true     Ran Out of Food in the Last Year: Never true   Transportation Needs: No Transportation Needs (4/6/2023)    PRAPARE - Transportation     Lack of Transportation (Medical): No     Lack of Transportation (Non-Medical): No   Physical Activity: Inactive (4/6/2023)    Exercise Vital Sign     Days of Exercise per Week: 0 days     Minutes of Exercise per Session: 0 min   Stress: No Stress  Concern Present (4/6/2023)    [employer] [employer] - Occupational Stress Questionnaire     Feeling of Stress : Not at all   Social Connections: Socially Isolated (4/6/2023)    Social Connection and Isolation Panel [NHANES]     Frequency of Communication with Friends and Family: More than three times a week     Frequency of Social Gatherings with Friends and Family: More than three times a week     Attends Hindu Services: Never     Active Member of Clubs or Organizations: No     Attends Club or Organization Meetings: Never     Marital Status:    Housing Stability: Low Risk  (4/6/2023)    Housing Stability Vital Sign     Unable to Pay for Housing in the Last Year: No     Number of Places Lived in the Last Year: 1     Unstable Housing in the Last Year: No     Any other notable Social History as documented in HPI.    Family History:  Family History   Problem Relation Age of Onset    Diabetes Mother     Heart disease Mother     Hypertension Mother     Diabetes Father     Heart disease Father     Diabetes Sister     Heart disease Sister      Any other notable FMH as documented in HPI.      OBJECTIVE:     Physical Exam:   Vitals:    10/30/23 0952   BP: (!) 147/84   Pulse: (!) 53       GEN: NAD, pleasant, cooperative  CV: RRR  PULM: No signs of resp distress, on room air  EXT: No cyanosis, clubbing, or edema.    NEURO:  Mental status: The patient is alert, attentive, and oriented to self, age, month, year  Speech: Fluent speech with intact repetition, comprehension, and naming. Phonation is normal.    Cranial nerves:  CN II: Visual fields are full to confrontation. Pupils are 3mm and reactive to light OU.  CN III, IV, VI: EOMI, no nystagmus, no ptosis  CN V: Facial sensation is intact in all 3 divisions bilaterally.  CN VII: Face is symmetric with normal eye closure and smile.  CN VIII: Hearing is normal bilaterally.  CN IX, X: Palate elevates symmetrically.   CN XI: Head turning and shoulder  shrug are intact.  CN XII: Tongue is midline with normal movements and no atrophy.    Motor: Muscle bulk and tone are normal. No pronator drift.  -- RUE: 4+/5 shoulder abduction, 5-/5 elbow extension/flexion  -- RLE: 5-/5 hip flexion, otherwise 5/5  -- LUE: 5/5  -- LLE: 5/5    Reflexes: Reflexes are 2+ and symmetric at the biceps, triceps, knees, and ankles.    Sensory: Light touch is intact in bilateral upper and lower extremities. Absent romberg.    Coordination: Rapid alternating movements and fine finger movements are intact. There is no dysmetria on finger-to-nose and heel-knee-shin. There are no abnormal or extraneous movements.    Gait/Stance: Posture is normal. Gait is steady with normal steps, base, arm swing, and turning. Heel and toe walking are normal. Tandem gait is normal.      ASSESSMENT/PLAN:     Diagnosis/Etiology: Right hemispheric infarctions with short-segment occlusion vs high-grade stenosis of the right superior M2 on CTA. Etiology cardioembolic (given atrial fibrillation with RVR during her hospital admission) vs FREDERICK (watershed pattern of infarctions, stuttering symptoms)  Stroke Risk Factors: HTN, DM, HLD, prior tobacco use  Effects of Stroke: Mild left hemiparesis    Recommendations:   - Additional studies: None  - Antiplatelet/Anticoagulation: Continue rivaroxaban 20mg qHS  - Lipid Management: Target is LDL < 70mg/dL. Continue atorvastatin 40mg.   - Diabetes: Target hemoglobin A1c <7%, measured 2X/year or quarterly if not meeting goals  - Hypertension: Target systolic BP <130mmHg. /82 today, but are -150 at home. Recommend close follow-up with her PCP for management of her blood pressures.   - Smoking: Prior heavy tobacco use. Quit 2019.  - Weight: Target BMI is <25kg/m2 if BMI is > 25-27 kg/m2; if BMI >27kg/m2 10% weight loss is suggested over next 6 months. Dietary consult is suggested to assist in weight control.  - Therapy: Continue HH OT.  - Follow-up: RTC in 6-12 months.  Recommend close follow-up with their PCP for monitoring and management of the above vascular risk factors as needed to meet goals.       Problem List Items Addressed This Visit          Endocrine    Severe obesity (BMI 35.0-39.9) with comorbidity - Primary     Other Visit Diagnoses       History of stroke                  Maria Elena Mir MD, PhD  [facility]  Department of Vascular Neurology

## 2024-07-04 ENCOUNTER — HOSPITAL ENCOUNTER (EMERGENCY)
Facility: HOSPITAL | Age: 74
Discharge: HOME | End: 2024-07-05
Attending: STUDENT IN AN ORGANIZED HEALTH CARE EDUCATION/TRAINING PROGRAM
Payer: COMMERCIAL
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
## 2024-07-04 DIAGNOSIS — S01.21XA LACERATION OF NOSE, INITIAL ENCOUNTER: Primary | ICD-10-CM

## 2024-07-04 DIAGNOSIS — S09.90XA CLOSED HEAD INJURY, INITIAL ENCOUNTER: ICD-10-CM

## 2024-07-04 DIAGNOSIS — S62.347A CLOSED NONDISPLACED FRACTURE OF BASE OF FIFTH METACARPAL BONE OF LEFT HAND, INITIAL ENCOUNTER: ICD-10-CM

## 2024-07-04 PROCEDURE — 99285 EMERGENCY DEPT VISIT HI MDM: CPT | Mod: 25

## 2024-07-04 PROCEDURE — 29125 APPL SHORT ARM SPLINT STATIC: CPT | Mod: LT

## 2024-07-05 VITALS
OXYGEN SATURATION: 99 % | RESPIRATION RATE: 18 BRPM | SYSTOLIC BLOOD PRESSURE: 180 MMHG | TEMPERATURE: 98 F | HEART RATE: 62 BPM | DIASTOLIC BLOOD PRESSURE: 80 MMHG

## 2024-07-05 PROCEDURE — 25000003 PHARM REV CODE 250: Performed by: STUDENT IN AN ORGANIZED HEALTH CARE EDUCATION/TRAINING PROGRAM

## 2024-07-05 RX ORDER — METOPROLOL TARTRATE 50 MG/1
50 TABLET ORAL
Status: COMPLETED | OUTPATIENT
Start: 2024-07-05 | End: 2024-07-05

## 2024-07-05 RX ORDER — HYDROCODONE BITARTRATE AND ACETAMINOPHEN 5; 325 MG/1; MG/1
1 TABLET ORAL EVERY 6 HOURS PRN
Qty: 11 TABLET | Refills: 0 | Status: SHIPPED | OUTPATIENT
Start: 2024-07-05

## 2024-07-05 RX ORDER — KETOROLAC TROMETHAMINE 30 MG/ML
30 INJECTION, SOLUTION INTRAMUSCULAR; INTRAVENOUS
Status: DISCONTINUED | OUTPATIENT
Start: 2024-07-05 | End: 2024-07-05 | Stop reason: HOSPADM

## 2024-07-05 RX ORDER — NAPROXEN 500 MG/1
500 TABLET ORAL EVERY 12 HOURS PRN
Qty: 14 TABLET | Refills: 0 | Status: SHIPPED | OUTPATIENT
Start: 2024-07-05 | End: 2024-07-12

## 2024-07-05 RX ORDER — ACETAMINOPHEN 500 MG
1000 TABLET ORAL
Status: COMPLETED | OUTPATIENT
Start: 2024-07-05 | End: 2024-07-05

## 2024-07-05 RX ORDER — HYDROCODONE BITARTRATE AND ACETAMINOPHEN 5; 325 MG/1; MG/1
1 TABLET ORAL
Status: DISCONTINUED | OUTPATIENT
Start: 2024-07-05 | End: 2024-07-05

## 2024-07-05 RX ADMIN — METOPROLOL TARTRATE 50 MG: 50 TABLET, FILM COATED ORAL at 12:07

## 2024-07-05 RX ADMIN — ACETAMINOPHEN 1000 MG: 500 TABLET ORAL at 12:07

## 2024-07-05 NOTE — ED TRIAGE NOTES
Ivet Crabtree is a 74 y.o female to ED c/o left wrist pain and pain to bridge of nose s/p fall at 2030.  Pt states that she was returning from fishing trip and had a slip and fall while trying to avoid the rain.  She was wearing flip flops at time of fall.  Denies LOC, n/v.  Abrasion present to bridge of nose. 7/10 pain currently.

## 2024-07-05 NOTE — ED PROVIDER NOTES
Encounter Date: 2024    SCRIBE #1 NOTE: I, Sarika Cedeno, am scribing for, and in the presence of,  Jesus Sexton MD.       History     Chief Complaint   Patient presents with    Fall     Patient arrives after a fall outside, around 20:30 this evening. Trip and fall, tripped on wet ramp and fell onto face. Her glasses cut the bridge of her nose and she is also having pain to left wrist. No LOC. Hypertensive - has not taken night BP meds. Also takes Xarelto.     73 yo F w/ PMHx of anxiety, depression, HTN, stroke, presenting to the ED for hand and facial trauma s/p mechanical fall occurring PTA today. Patient reports she suffered a slip and fall in the rain while wearing flip flops, causing her to fall onto the ground, hitting her face and L hand. She reports pain to her L hand, pain to her face, and a wound to her face since. No LOC, vision changes, paresthesias, chest pain, dyspnea, abdominal pain, or other associated symptoms. No other exacerbating or alleviating factors.     The history is provided by the patient.     Review of patient's allergies indicates:   Allergen Reactions    Talwin [pentazocine lactate] Shortness Of Breath    Ace inhibitors Other (See Comments)     cough     Past Medical History:   Diagnosis Date    Anxiety     Depression     Heart attack     12 years ago    History of heart attack     Hypertension     Stroke due to occlusion of right middle cerebral artery 2023     Past Surgical History:   Procedure Laterality Date     SECTION      EYE SURGERY Bilateral     CATARACT     Family History   Problem Relation Name Age of Onset    Diabetes Mother      Heart disease Mother      Hypertension Mother      Diabetes Father      Heart disease Father      Diabetes Sister      Heart disease Sister       Social History     Tobacco Use    Smoking status: Former     Current packs/day: 0.50     Types: Cigarettes     Passive exposure: Past    Smokeless tobacco: Never    Tobacco  comments:     Q   Substance Use Topics    Alcohol use: Yes     Comment: seldom    Drug use: No     Review of Systems   Constitutional:  Negative for chills and fever.   HENT:  Negative for facial swelling and sore throat.         +facial pain and wound   Eyes:  Negative for visual disturbance.   Respiratory:  Negative for cough and shortness of breath.    Cardiovascular:  Negative for chest pain and palpitations.   Gastrointestinal:  Negative for abdominal pain, nausea and vomiting.   Genitourinary:  Negative for dysuria and hematuria.   Musculoskeletal:  Negative for back pain.        +L hand pain   Skin:  Negative for rash.   Neurological:  Negative for syncope, weakness and headaches.   Hematological:  Does not bruise/bleed easily.   Psychiatric/Behavioral: Negative.         Physical Exam     Initial Vitals [07/04/24 2211]   BP Pulse Resp Temp SpO2   (!) 197/79 64 18 97.4 °F (36.3 °C) 98 %      MAP       --         Physical Exam    Nursing note and vitals reviewed.  Constitutional: She appears well-developed and well-nourished. She is not diaphoretic. No distress.   HENT:   Head: Normocephalic and atraumatic.   Right Ear: External ear normal.   Left Ear: External ear normal.   Nose: Nose normal.   2 shallow lacerations to the bridge of the nose, no septal hematoma   Eyes: Conjunctivae and EOM are normal. Pupils are equal, round, and reactive to light. No scleral icterus. Right eye exhibits no nystagmus. Left eye exhibits no nystagmus.   Neck: Neck supple. No tracheal deviation present.   Normal range of motion.  Cardiovascular:  Normal rate, regular rhythm, normal heart sounds and intact distal pulses.     Exam reveals no gallop and no friction rub.       No murmur heard.  Pulmonary/Chest: Breath sounds normal. No respiratory distress.   Abdominal: Abdomen is soft. Bowel sounds are normal. There is no abdominal tenderness.   Musculoskeletal:      Cervical back: Normal range of motion and neck supple.       Comments: No step-offs or deformities noted on spinal examination.  No midline tenderness to palpation cervical thoracic, lumbar spine.   Contusion to dorsal aspect of L palm, overlying the 4th metacarpal, w/ ttp, FROM of L wrist. L arm NVI     Neurological: She is alert and oriented to person, place, and time. She has normal strength. No cranial nerve deficit or sensory deficit. GCS score is 15. GCS eye subscore is 4. GCS verbal subscore is 5. GCS motor subscore is 6.   Skin: Skin is warm and dry.   Psychiatric: She has a normal mood and affect. Thought content normal.         ED Course   Procedures  Labs Reviewed - No data to display       Imaging Results              X-Ray Hand 3 View Left (Final result)  Result time 07/05/24 00:50:23      Final result by Wandy Nichols MD (07/05/24 00:50:23)                   Impression:      As above described.      Electronically signed by: Wandy Nichols  Date:    07/05/2024  Time:    00:50               Narrative:    EXAMINATION:  THREE VIEWS OF THE LEFT HAND    CLINICAL HISTORY:  Left pinky pain;    TECHNIQUE:  AP, lateral and oblique views of the left hand    COMPARISON:  None.    FINDINGS:  Three views of the left hand demonstrate acute traumatic minimally displaced fracture at the base of the 5th metacarpal.  The bones are diffusely osteopenic.  A pulse arcus on the index finger.                                       X-Ray Wrist Complete Left (Final result)  Result time 07/04/24 23:28:59      Final result by Wandy Nichols MD (07/04/24 23:28:59)                   Impression:      As above described.      Electronically signed by: Wandy Nichols  Date:    07/04/2024  Time:    23:28               Narrative:    EXAMINATION:  THREE VIEWS OF THE LEFT WRIST    CLINICAL HISTORY:  Fall on same level from slipping, tripping and stumbling without subsequent striking against object, initial encounter    TECHNIQUE:  AP, oblique, and lateral views of the left  wrist    COMPARISON:  None.    FINDINGS:  Dorsal soft tissue swelling is seen at the posterior wrist.  Three views of the left wrist demonstrate a fracture of the base of the 5th metacarpal.  The bones are diffusely osteopenic.                                       CT Head Without Contrast (Final result)  Result time 07/04/24 23:19:11      Final result by Wandy Nichols MD (07/04/24 23:19:11)                   Impression:      No acute intracranial abnormality detected.    No acute displaced maxillofacial fracture.    No acute cervical fracture.  Spondylitic changes.  Straightening of the normal cervical lordosis.    16 mm low-attenuation right thyroid lesion.  Recommend nonemergent outpatient thyroid ultrasound.      Electronically signed by: Wandy Nichols  Date:    07/04/2024  Time:    23:19               Narrative:    EXAMINATION:  CT OF THE HEAD WITHOUT, CT MAXILLOFACIAL, AND CT CERVICAL SPINE    CLINICAL HISTORY:  Head trauma, minor (Age >= 65y);; Facial trauma, blunt;; Neck trauma (Age >= 65y);    TECHNIQUE:  5 mm unenhanced axial images were obtained from the skull base to the vertex.  1.25 mm axial images were obtained through the maxillofacial bones and cervical spine.    COMPARISON:  CT head 4/11/2023    FINDINGS:  CT head: Stable cerebral atrophy and chronic small vessel ischemic changes are present.  There is a remote infarct in the right frontal lobe.  There is no acute intracranial hemorrhage, territorial infarct or mass effect, or midline shift.    CT maxillofacial: There is no acute displaced maxillofacial fractures. In the visualized paranasal sinuses and mastoid air cells, there is mild bilateral maxillary sinus and bilateral ethmoid air cell mucoperiosteal thickening and a small mucous retention cyst or polyp in the left sphenoid sinus.  Mild degenerative changes are seen at the left temporomandibular joint.  The orbits are symmetric.    CT cervical spine: There is straightening of the  normal cervical lordosis, which may indicate muscular spasm or the presence of a cervical neck collar.  There is no acute fracture or subluxation.  An extruded disc is seen C3/C4 with mild central canal narrowing.  Moderate to severe degenerative changes are seen from the mid to lower cervical spine.  Prominent facet arthrosis is seen at left C2/C3 and left C7/T1.  Bones are normally mineralized.  Incidental note is made of a 16 mm low-attenuation right thyroid lesion.                                       CT Maxillofacial Without Contrast (Final result)  Result time 07/04/24 23:19:11      Final result by Wandy Nichols MD (07/04/24 23:19:11)                   Impression:      No acute intracranial abnormality detected.    No acute displaced maxillofacial fracture.    No acute cervical fracture.  Spondylitic changes.  Straightening of the normal cervical lordosis.    16 mm low-attenuation right thyroid lesion.  Recommend nonemergent outpatient thyroid ultrasound.      Electronically signed by: Wandy Nichols  Date:    07/04/2024  Time:    23:19               Narrative:    EXAMINATION:  CT OF THE HEAD WITHOUT, CT MAXILLOFACIAL, AND CT CERVICAL SPINE    CLINICAL HISTORY:  Head trauma, minor (Age >= 65y);; Facial trauma, blunt;; Neck trauma (Age >= 65y);    TECHNIQUE:  5 mm unenhanced axial images were obtained from the skull base to the vertex.  1.25 mm axial images were obtained through the maxillofacial bones and cervical spine.    COMPARISON:  CT head 4/11/2023    FINDINGS:  CT head: Stable cerebral atrophy and chronic small vessel ischemic changes are present.  There is a remote infarct in the right frontal lobe.  There is no acute intracranial hemorrhage, territorial infarct or mass effect, or midline shift.    CT maxillofacial: There is no acute displaced maxillofacial fractures. In the visualized paranasal sinuses and mastoid air cells, there is mild bilateral maxillary sinus and bilateral ethmoid air  cell mucoperiosteal thickening and a small mucous retention cyst or polyp in the left sphenoid sinus.  Mild degenerative changes are seen at the left temporomandibular joint.  The orbits are symmetric.    CT cervical spine: There is straightening of the normal cervical lordosis, which may indicate muscular spasm or the presence of a cervical neck collar.  There is no acute fracture or subluxation.  An extruded disc is seen C3/C4 with mild central canal narrowing.  Moderate to severe degenerative changes are seen from the mid to lower cervical spine.  Prominent facet arthrosis is seen at left C2/C3 and left C7/T1.  Bones are normally mineralized.  Incidental note is made of a 16 mm low-attenuation right thyroid lesion.                                       CT Cervical Spine Without Contrast (Final result)  Result time 07/04/24 23:19:11      Final result by Wandy Nichols MD (07/04/24 23:19:11)                   Impression:      No acute intracranial abnormality detected.    No acute displaced maxillofacial fracture.    No acute cervical fracture.  Spondylitic changes.  Straightening of the normal cervical lordosis.    16 mm low-attenuation right thyroid lesion.  Recommend nonemergent outpatient thyroid ultrasound.      Electronically signed by: Wandy Nichols  Date:    07/04/2024  Time:    23:19               Narrative:    EXAMINATION:  CT OF THE HEAD WITHOUT, CT MAXILLOFACIAL, AND CT CERVICAL SPINE    CLINICAL HISTORY:  Head trauma, minor (Age >= 65y);; Facial trauma, blunt;; Neck trauma (Age >= 65y);    TECHNIQUE:  5 mm unenhanced axial images were obtained from the skull base to the vertex.  1.25 mm axial images were obtained through the maxillofacial bones and cervical spine.    COMPARISON:  CT head 4/11/2023    FINDINGS:  CT head: Stable cerebral atrophy and chronic small vessel ischemic changes are present.  There is a remote infarct in the right frontal lobe.  There is no acute intracranial hemorrhage,  territorial infarct or mass effect, or midline shift.    CT maxillofacial: There is no acute displaced maxillofacial fractures. In the visualized paranasal sinuses and mastoid air cells, there is mild bilateral maxillary sinus and bilateral ethmoid air cell mucoperiosteal thickening and a small mucous retention cyst or polyp in the left sphenoid sinus.  Mild degenerative changes are seen at the left temporomandibular joint.  The orbits are symmetric.    CT cervical spine: There is straightening of the normal cervical lordosis, which may indicate muscular spasm or the presence of a cervical neck collar.  There is no acute fracture or subluxation.  An extruded disc is seen C3/C4 with mild central canal narrowing.  Moderate to severe degenerative changes are seen from the mid to lower cervical spine.  Prominent facet arthrosis is seen at left C2/C3 and left C7/T1.  Bones are normally mineralized.  Incidental note is made of a 16 mm low-attenuation right thyroid lesion.                                       Medications   ketorolac injection 30 mg (30 mg Intramuscular Not Given 7/5/24 0030)   acetaminophen tablet 1,000 mg (1,000 mg Oral Given 7/5/24 0053)   metoprolol tartrate (LOPRESSOR) tablet 50 mg (50 mg Oral Given 7/5/24 0052)     Medical Decision Making  Amount and/or Complexity of Data Reviewed  Radiology: ordered. Decision-making details documented in ED Course.    Risk  OTC drugs.  Prescription drug management.            Scribe Attestation:   Scribe #1: I performed the above scribed service and the documentation accurately describes the services I performed. I attest to the accuracy of the note.        ED Course as of 07/05/24 0343   Fri Jul 05, 2024 0035 CT Head Without Contrast  Impression:     No acute intracranial abnormality detected.     No acute displaced maxillofacial fracture.     No acute cervical fracture.  Spondylitic changes.  Straightening of the normal cervical lordosis.     16 mm  low-attenuation right thyroid lesion.  Recommend nonemergent outpatient thyroid ultrasound.      [CC]   0302 Differential diagnoses considered but not limited to calvarial fracture, intracranial hemorrhage, facial fracture, cervical fracture, subluxation, metacarpal fracture, metacarpal dislocation  [CC]   0302 Patient presenting to the emergency department for evaluation of right hand pain, nose pain after fall.  Sustained head injury.  CT of the head is negative for any acute abnormalities including intracranial hemorrhage or calvarial fracture.  No facial fracture noted on maxillofacial CT.  Very s shallow laceration/abrasion noted to nasal bridge.  Repaired with Dermabond.  Patient tolerated well.  Hand x-ray with 5th metacarpal base fracture.  Patient placed in ulnar gutter splint.  Plan to have follow up with hand surgery.  Patient tolerating pain well.  Patient hypertensive in the setting of pain.  She denies any chest pain, shortness for breath, syncope or other symptoms concerning for hypertensive emergency.  Patient received home medication here in the emergency department.  I do not believe further workup laboratory or otherwise is indicated.  Strict return precautions given. I believe patient is appropriate for discharge and continued outpatient evaulation/treatment.  I discussed with the patient/family the diagnosis, treatment plan, indications for return to the emergency department, and for expected follow-up. The patient/family verbalized an understanding. The patient/family  asked if there are any questions or concerns. We discuss the case, until all issues are addressed to the patient/family's satisfaction. Patient/family understands and is agreeable to the plan. Patient is stable and ready for discharge.   [CC]      ED Course User Index  [CC] Jesus Sexton MD I, Clifford Cranford, MD, personally performed the services described in this documentation. All medical record  entries made by the scribe were at my direction and in my presence. I have reviewed the chart and agree that the record reflects my personal performance and is accurate and complete.      Clinical Impression:  Final diagnoses:  [W01.0XXA] Fall from slip, trip, or stumble  [S01.21XA] Laceration of nose, initial encounter (Primary)  [S62.347A] Closed nondisplaced fracture of base of fifth metacarpal bone of left hand, initial encounter  [S09.90XA] Closed head injury, initial encounter          ED Disposition Condition    Discharge Stable          ED Prescriptions       Medication Sig Dispense Start Date End Date Auth. Provider    naproxen (NAPROSYN) 500 MG tablet Take 1 tablet (500 mg total) by mouth every 12 (twelve) hours as needed (pain). Take with meals. 14 tablet 7/5/2024 7/12/2024 Jesus Sexton MD    HYDROcodone-acetaminophen (NORCO) 5-325 mg per tablet Take 1 tablet by mouth every 6 (six) hours as needed for Pain. 11 tablet 7/5/2024 -- Jesus Sexton MD          Follow-up Information       Follow up With Specialties Details Why Contact Info    Zoie Garcia MD Internal Medicine Schedule an appointment as soon as possible for a visit in 2 days  [address] [facility]  [place] [address]  [phone]      [facility] - Emergency Dept Emergency Medicine Go to  If symptoms worsen [address] [facility] [place] [address] [phone]             Jesus Sexton MD  07/05/24 0341